# Patient Record
Sex: MALE | Race: BLACK OR AFRICAN AMERICAN | Employment: UNEMPLOYED | URBAN - METROPOLITAN AREA
[De-identification: names, ages, dates, MRNs, and addresses within clinical notes are randomized per-mention and may not be internally consistent; named-entity substitution may affect disease eponyms.]

---

## 2017-01-17 ENCOUNTER — OFFICE VISIT (OUTPATIENT)
Dept: INTERNAL MEDICINE CLINIC | Age: 52
End: 2017-01-17

## 2017-01-17 VITALS
HEIGHT: 69 IN | OXYGEN SATURATION: 99 % | HEART RATE: 79 BPM | RESPIRATION RATE: 18 BRPM | BODY MASS INDEX: 24.44 KG/M2 | SYSTOLIC BLOOD PRESSURE: 166 MMHG | TEMPERATURE: 97.9 F | DIASTOLIC BLOOD PRESSURE: 85 MMHG | WEIGHT: 165 LBS

## 2017-01-17 DIAGNOSIS — F41.0 PANIC ATTACKS: ICD-10-CM

## 2017-01-17 DIAGNOSIS — G89.4 CHRONIC PAIN SYNDROME: ICD-10-CM

## 2017-01-17 DIAGNOSIS — I10 ESSENTIAL HYPERTENSION, BENIGN: Primary | ICD-10-CM

## 2017-01-17 RX ORDER — AMLODIPINE BESYLATE 5 MG/1
TABLET ORAL
Qty: 90 TAB | Refills: 3 | Status: SHIPPED | OUTPATIENT
Start: 2017-01-17 | End: 2017-08-28 | Stop reason: SDUPTHER

## 2017-01-17 NOTE — PROGRESS NOTES
Jerry Lema is a 54 y.o. male and presents with Hypertension and Follow-up  . Pt has been having episodes of panic attacks. Has had 4-5 attacks in the past 4-5 days. +increased stress. C/o heart racing and feeling panicky. +h/o panic attacks before and has taken an unknown medication for them in the past.     Pt has only been taking HTN meds prn. No CP, SOB, or edema. No side effects. Last pill was a week ago. Pt doesn' like taking pills b/c he feels that they killed his mother and sister. Still c/o joint pain all over. Review of Systems  Constitutional: negative for fevers, chills, anorexia and weight loss  Eyes:   negative for visual disturbance and irritation  ENT:   negative for tinnitus,sore throat,nasal congestion,ear pains. hoarseness  Respiratory:  negative for cough, hemoptysis, dyspnea,wheezing  CV:   negative for chest pain, palpitations, lower extremity edema  GI:   negative for nausea, vomiting, diarrhea, abdominal pain,melena  Endo:               negative for polyuria,polydipsia,polyphagia,heat intolerance  Genitourinary: negative for frequency, dysuria and hematuria  Integument:  negative for rash and pruritus  Hematologic:  negative for easy bruising and gum/nose bleeding  Musculoskel: negative for myalgias,  back pain, muscle weakness  Neurological:  negative for headaches, dizziness, vertigo, memory problems and gait   Behavl/Psych: negative for feelings of depression    Past Medical History   Diagnosis Date    Anxiety      anxiety    Chronic pain      back    Gastrointestinal disorder     GERD (gastroesophageal reflux disease)     H/O Bell's palsy      left side of face tingles, intermittently, not frequently    Hyperlipidemia     Hypertension     Meningitis 2012     spinal meningitis  ; resolved as of 4/3/14    Positive PPD      CXR have been negative per pt 4/4/14    Tennis elbow 4/3/14     left      Past Surgical History   Procedure Laterality Date    Hx orthopaedic right knee ligament repair    Hx orthopaedic       right  hand    Hx orthopaedic       right rotator cuff     Social History     Social History    Marital status: SINGLE     Spouse name: N/A    Number of children: N/A    Years of education: N/A     Social History Main Topics    Smoking status: Current Some Day Smoker     Packs/day: 0.25    Smokeless tobacco: Never Used    Alcohol use No    Drug use: No    Sexual activity: Yes     Partners: Female     Other Topics Concern    None     Social History Narrative    ** Merged History Encounter **          Current Outpatient Prescriptions   Medication Sig Dispense Refill    amLODIPine (NORVASC) 5 mg tablet TAKE 1 TABLET BY MOUTH EVERY DAY 90 Tab 3    traMADol (ULTRAM) 50 mg tablet Take 1 Tab by mouth every eight (8) hours as needed for Pain. Max Daily Amount: 150 mg. Indications: PAIN 60 Tab 0    VITAMIN D2 50,000 unit capsule TAKE 1 CAPSULE BY MOUTH EVERY WEEK 12 Cap 3    diclofenac EC (VOLTAREN) 75 mg EC tablet  75 mg = 1 tab each dose, PO, bid, with food, # 60 tab, 1 Refills, Pharmacy: [id number]      atorvastatin (LIPITOR) 20 mg tablet Take 0.5 Tabs by mouth daily. 45 Tab 3    triamcinolone acetonide (KENALOG) 0.1 % topical cream Apply  to affected area two (2) times a day. use thin layer 45 g 11    albuterol (PROVENTIL VENTOLIN) 2.5 mg /3 mL (0.083 %) nebulizer solution USE 3 ML VIA NEBULIZER EVERY 4 HOURS AS NEEDED FOR WHEEZING 100 Each 11    VENTOLIN HFA 90 mcg/actuation inhaler INHALE 2 PUFFS BY MOUTH EVERY 4 HOURS AS NEEDED FOR WHEEZING 1 Inhaler 11    ergocalciferol (ERGOCALCIFEROL) 50,000 unit capsule TAKE 1 CAPSULE BY MOUTH EVERY WEEK 12 Cap 3    pantoprazole (PROTONIX) 40 mg tablet TAKE 1 TABLET BY MOUTH ONCE DAILY 90 Tab 11    ibuprofen (MOTRIN) 600 mg tablet Take 1 tablet by mouth every eight (8) hours as needed for Pain.  30 tablet 0    busPIRone (BUSPAR) 10 mg tablet Take 1 Tab by mouth three (3) times daily (with meals) for 30 days. Indications: anxiety with depression 90 Tab 0     No Known Allergies    Objective:  Visit Vitals    /85 (BP 1 Location: Right arm, BP Patient Position: Sitting)    Pulse 79    Temp 97.9 °F (36.6 °C) (Oral)    Resp 18    Ht 5' 9\" (1.753 m)    Wt 165 lb (74.8 kg)    SpO2 99%    BMI 24.37 kg/m2     Physical Exam:   General appearance - alert, well appearing, and in no distress  Mental status - alert, oriented to person, place, and time  Chest - clear to auscultation, no wheezes, rales or rhonchi, symmetric air entry   Heart - normal rate, regular rhythm, normal S1, S2, no murmurs, rubs, clicks or gallops   Abdomen - soft, nontender, nondistended, no masses or organomegaly  Lymph- no adenopathy palpable  Ext-peripheral pulses normal, no pedal edema, no clubbing or cyanosis  Skin-Warm and dry. no hyperpigmentation, vitiligo, or suspicious lesions  Neuro -alert, oriented, normal speech, no focal findings or movement disorder noted    Assessment/Plan:    ICD-10-CM ICD-9-CM    1. Essential hypertension, benign I10 401.1 amLODIPine (NORVASC) 5 mg tablet   2. Panic attacks F41.0 300.01 REFERRAL TO PSYCHOLOGY   3. Chronic pain syndrome G89.4 338.4 REFERRAL TO PSYCHOLOGY     Orders Placed This Encounter    REFERRAL TO PSYCHOLOGY     Referral Priority:   Routine     Referral Type:   Behavioral Health     Referral Reason:   Specialty Services Required    amLODIPine (NORVASC) 5 mg tablet     Sig: TAKE 1 TABLET BY MOUTH EVERY DAY     Dispense:  90 Tab     Refill:  3     **Patient requests 90 days supply**     Panic attacks- discussed non- pharmaceutical ways to cope with panic attacks  HTN- noncompliant. Advised compliance and stated consequences of uncontrolled HTN  Chronic pain-Pt isn't interested in meds so made psych referral to talk about other coping mechanisms for pain control     Follow-up Disposition:  Return in about 6 months (around 7/17/2017) for HTN.

## 2017-01-17 NOTE — MR AVS SNAPSHOT
Visit Information Date & Time Provider Department Dept. Phone Encounter #  
 1/17/2017 10:00 AM Lizeth Lindquist, [address] [id number] Upcoming Health Maintenance Date Due Pneumococcal 19-64 Medium Risk (1 of 1 - PPSV23) 7/18/1986 DTaP/Tdap/Td series (1 - Tdap) 7/18/1988 Allergies as of 1/17/2017  Review Complete On: 1/17/2017 By: Lizeth Lindquist MD  
 No Known Allergies Current Immunizations  Reviewed on 12/27/2011 No immunizations on file. Not reviewed this visit You Were Diagnosed With   
  
 Codes Comments Essential hypertension, benign    -  Primary ICD-10-CM: I10 
ICD-9-CM: 401.1 Panic attacks     ICD-10-CM: F41.0 ICD-9-CM: 300.01 Chronic pain syndrome     ICD-10-CM: G89.4 ICD-9-CM: 338. 4 Vitals BP Pulse Temp Resp Height(growth percentile) Weight(growth percentile) 166/85 (BP 1 Location: Right arm, BP Patient Position: Sitting) 79 97.9 °F (36.6 °C) (Oral) 18 5' 9\" (1.753 m) 165 lb (74.8 kg) SpO2 BMI Smoking Status 99% 24.37 kg/m2 Current Some Day Smoker Vitals History BMI and BSA Data Body Mass Index Body Surface Area  
 24.37 kg/m 2 1.91 m 2 Preferred Pharmacy Pharmacy Name Phone [facility] Keith Thakkar  [place] [phone] Your Updated Medication List  
  
   
This list is accurate as of: 1/17/17 11:25 AM.  Always use your most recent med list. amLODIPine 5 mg tablet Commonly known as:  Maldonado Cater TAKE 1 TABLET BY MOUTH EVERY DAY  
  
 atorvastatin 20 mg tablet Commonly known as:  LIPITOR Take 0.5 Tabs by mouth daily. busPIRone 10 mg tablet Commonly known as:  BUSPAR Take 1 Tab by mouth three (3) times daily (with meals) for 30 days. Indications: anxiety with depression  
  
 diclofenac EC 75 mg EC tablet Commonly known as:  VOLTAREN  
 75 mg = 1 tab each dose, PO, bid, with food, # 60 tab, 1 Refills, Pharmacy: [facility] * ergocalciferol 50,000 unit capsule Commonly known as:  ERGOCALCIFEROL  
TAKE 1 CAPSULE BY MOUTH EVERY WEEK * VITAMIN D2 50,000 unit capsule Generic drug:  ergocalciferol TAKE 1 CAPSULE BY MOUTH EVERY WEEK  
  
 ibuprofen 600 mg tablet Commonly known as:  MOTRIN Take 1 tablet by mouth every eight (8) hours as needed for Pain.  
  
 pantoprazole 40 mg tablet Commonly known as:  PROTONIX  
TAKE 1 TABLET BY MOUTH ONCE DAILY  
  
 traMADol 50 mg tablet Commonly known as:  ULTRAM  
Take 1 Tab by mouth every eight (8) hours as needed for Pain. Max Daily Amount: 150 mg. Indications: PAIN  
  
 triamcinolone acetonide 0.1 % topical cream  
Commonly known as:  KENALOG Apply  to affected area two (2) times a day. use thin layer * VENTOLIN HFA 90 mcg/actuation inhaler Generic drug:  albuterol INHALE 2 PUFFS BY MOUTH EVERY 4 HOURS AS NEEDED FOR WHEEZING  
  
 * albuterol 2.5 mg /3 mL (0.083 %) nebulizer solution Commonly known as:  PROVENTIL VENTOLIN  
USE 3 ML VIA NEBULIZER EVERY 4 HOURS AS NEEDED FOR WHEEZING  
  
 * Notice: This list has 4 medication(s) that are the same as other medications prescribed for you. Read the directions carefully, and ask your doctor or other care provider to review them with you. Prescriptions Sent to Pharmacy Refills  
 amLODIPine (NORVASC) 5 mg tablet 3 Sig: TAKE 1 TABLET BY MOUTH EVERY DAY Class: Normal  
 Pharmacy: [facility] [address] #: [phone] We Performed the Following REFERRAL TO PSYCHOLOGY [ODX91 Custom] Referral Information Referral ID Referred By Referred To  
  
 [id number] Dewain Factor Not Available Visits Status Start Date End Date 1 New Request 1/17/17 1/17/18 If your referral has a status of pending review or denied, additional information will be sent to support the outcome of this decision. Introducing [facility] & HEALTH SERVICES! [employer] introduces Plextronics patient portal. Now you can access parts of your medical record, email your doctor's office, and request medication refills online. 1. In your internet browser, go to [email or web address] 2. Click on the First Time User? Click Here link in the Sign In box. You will see the New Member Sign Up page. 3. Enter your Plextronics Access Code exactly as it appears below. You will not need to use this code after youve completed the sign-up process. If you do not sign up before the expiration date, you must request a new code. · Plextronics Access Code: [email or web address] Expires: 4/17/2017 10:12 AM 
 
4. Enter the last four digits of your Social Security Number (xxxx) and Date of Birth (mm/dd/yyyy) as indicated and click Submit. You will be taken to the next sign-up page. 5. Create a Plextronics ID. This will be your Plextronics login ID and cannot be changed, so think of one that is secure and easy to remember. 6. Create a Plextronics password. You can change your password at any time. 7. Enter your Password Reset Question and Answer. This can be used at a later time if you forget your password. 8. Enter your e-mail address. You will receive e-mail notification when new information is available in [address] 9. Click Sign Up. You can now view and download portions of your medical record. 10. Click the Download Summary menu link to download a portable copy of your medical information. If you have questions, please visit the Frequently Asked Questions section of the Plextronics website. Remember, Plextronics is NOT to be used for urgent needs. For medical emergencies, dial 911. Now available from your iPhone and Android! Please provide this summary of care documentation to your next provider. Your primary care clinician is listed as Gregorio Powell. If you have any questions after today's visit, please call [phone].

## 2017-01-17 NOTE — PROGRESS NOTES
1. Have you been to the ER, urgent care clinic since your last visit? Hospitalized since your last visit? No.    2. Have you seen or consulted any other health care providers outside of the [address] [place] [address] since your last visit? Include any pap smears or colon screening. No.  Had tramadol last night.

## 2017-02-03 ENCOUNTER — OFFICE VISIT (OUTPATIENT)
Dept: INTERNAL MEDICINE CLINIC | Age: 52
End: 2017-02-03

## 2017-02-03 VITALS
OXYGEN SATURATION: 99 % | SYSTOLIC BLOOD PRESSURE: 126 MMHG | WEIGHT: 170 LBS | HEIGHT: 69 IN | TEMPERATURE: 98.1 F | DIASTOLIC BLOOD PRESSURE: 87 MMHG | BODY MASS INDEX: 25.18 KG/M2 | HEART RATE: 71 BPM | RESPIRATION RATE: 18 BRPM

## 2017-02-03 DIAGNOSIS — T07.XXXA CONTUSION, MULTIPLE SITES: Primary | ICD-10-CM

## 2017-02-03 DIAGNOSIS — R91.1 PULMONARY NODULE: ICD-10-CM

## 2017-02-03 DIAGNOSIS — N20.0 NEPHROLITHIASIS: ICD-10-CM

## 2017-02-03 RX ORDER — TRAMADOL HYDROCHLORIDE 50 MG/1
50 TABLET ORAL
Qty: 75 TAB | Refills: 0 | Status: SHIPPED | OUTPATIENT
Start: 2017-02-03 | End: 2017-06-18

## 2017-02-03 NOTE — MR AVS SNAPSHOT
Visit Information Date & Time Provider Department Dept. Phone Encounter #  
 2/3/2017  1:15 PM Riky London, [address] [id number] Upcoming Health Maintenance Date Due Pneumococcal 19-64 Medium Risk (1 of 1 - PPSV23) 7/18/1986 DTaP/Tdap/Td series (1 - Tdap) 7/18/1988 Allergies as of 2/3/2017  Review Complete On: 2/3/2017 By: Riky London MD  
 No Known Allergies Current Immunizations  Reviewed on 12/27/2011 No immunizations on file. Not reviewed this visit You Were Diagnosed With   
  
 Codes Comments Contusion, multiple sites    -  Primary ICD-10-CM: T14.8 ICD-9-CM: 924.8 Vitals BP Pulse Temp Resp Height(growth percentile) Weight(growth percentile) 126/87 (BP 1 Location: Left arm, BP Patient Position: Sitting) 71 98.1 °F (36.7 °C) (Oral) 18 5' 9\" (1.753 m) 170 lb (77.1 kg) SpO2 BMI Smoking Status 99% 25.1 kg/m2 Current Some Day Smoker Vitals History BMI and BSA Data Body Mass Index Body Surface Area  
 25.1 kg/m 2 1.94 m 2 Preferred Pharmacy Pharmacy Name Phone Kim [address] [place] [address] 963, [address] [facility] [phone] Your Updated Medication List  
  
   
This list is accurate as of: 2/3/17  2:36 PM.  Always use your most recent med list. amLODIPine 5 mg tablet Commonly known as:  Cecilia Darting TAKE 1 TABLET BY MOUTH EVERY DAY  
  
 atorvastatin 20 mg tablet Commonly known as:  LIPITOR Take 0.5 Tabs by mouth daily. busPIRone 10 mg tablet Commonly known as:  BUSPAR Take 1 Tab by mouth three (3) times daily (with meals) for 30 days. Indications: anxiety with depression  
  
 diclofenac EC 75 mg EC tablet Commonly known as:  VOLTAREN  
75 mg = 1 tab each dose, PO, bid, with food, # 60 tab, 1 Refills, Pharmacy: [facility] [id number] * ergocalciferol 50,000 unit capsule Commonly known as:  ERGOCALCIFEROL  
TAKE 1 CAPSULE BY MOUTH EVERY WEEK * VITAMIN D2 50,000 unit capsule Generic drug:  ergocalciferol TAKE 1 CAPSULE BY MOUTH EVERY WEEK  
  
 ibuprofen 600 mg tablet Commonly known as:  MOTRIN Take 1 tablet by mouth every eight (8) hours as needed for Pain.  
  
 pantoprazole 40 mg tablet Commonly known as:  PROTONIX  
TAKE 1 TABLET BY MOUTH ONCE DAILY  
  
 traMADol 50 mg tablet Commonly known as:  ULTRAM  
Take 1 Tab by mouth every eight (8) hours as needed for Pain. Max Daily Amount: 150 mg. Indications: Pain  
  
 triamcinolone acetonide 0.1 % topical cream  
Commonly known as:  KENALOG Apply  to affected area two (2) times a day. use thin layer * VENTOLIN HFA 90 mcg/actuation inhaler Generic drug:  albuterol INHALE 2 PUFFS BY MOUTH EVERY 4 HOURS AS NEEDED FOR WHEEZING  
  
 * albuterol 2.5 mg /3 mL (0.083 %) nebulizer solution Commonly known as:  PROVENTIL VENTOLIN  
USE 3 ML VIA NEBULIZER EVERY 4 HOURS AS NEEDED FOR WHEEZING  
  
 * Notice: This list has 4 medication(s) that are the same as other medications prescribed for you. Read the directions carefully, and ask your doctor or other care provider to review them with you. Prescriptions Printed Refills  
 traMADol (ULTRAM) 50 mg tablet 0 Sig: Take 1 Tab by mouth every eight (8) hours as needed for Pain. Max Daily Amount: 150 mg. Indications: Pain Class: Print Route: Oral  
  
Introducing [facility] & HEALTH SERVICES! Madison Chap[employer] introduces [employer] patient portal. Now you can access parts of your medical record, email your doctor's office, and request medication refills online. 1. In your internet browser, go to [email or web address] 2. Click on the First Time User? Click Here link in the Sign In box. You will see the New Member Sign Up page. 3. Enter your Jolic[employer]oud Access Code exactly as it appears below.  You will not need to use this code after youve completed the sign-up process. If you do not sign up before the expiration date, you must request a new code. · Deg[employer] Access Code: [email or web address] Expires: 4/17/2017 10:12 AM 
 
4. Enter the last four digits of your Social Security Number (xxxx) and Date of Birth (mm/dd/yyyy) as indicated and click Submit. You will be taken to the next sign-up page. 5. Create a Degreed ID. This will be your Degreed login ID and cannot be changed, so think of one that is secure and easy to remember. 6. Create a Degreed password. You can change your password at any time. 7. Enter your Password Reset Question and Answer. This can be used at a later time if you forget your password. 8. Enter your e-mail address. You will receive e-mail notification when new information is available in [address] 9. Click Sign Up. You can now view and download portions of your medical record. 10. Click the Download Summary menu link to download a portable copy of your medical information. If you have questions, please visit the Frequently Asked Questions section of the Deg[employer] website. Remember, Degreed is NOT to be used for urgent needs. For medical emergencies, dial 911. Now available from your iPhone and Android! Please provide this summary of care documentation to your next provider. Your primary care clinician is listed as Danni Gomez. If you have any questions after today's visit, please call [phone].
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
## 2017-06-18 ENCOUNTER — HOSPITAL ENCOUNTER (EMERGENCY)
Age: 52
Discharge: HOME | End: 2017-06-18
Attending: EMERGENCY MEDICINE
Payer: MEDICAID

## 2017-06-18 VITALS
WEIGHT: 160 LBS | RESPIRATION RATE: 16 BRPM | HEIGHT: 69 IN | HEART RATE: 53 BPM | BODY MASS INDEX: 23.7 KG/M2 | SYSTOLIC BLOOD PRESSURE: 120 MMHG | OXYGEN SATURATION: 100 % | DIASTOLIC BLOOD PRESSURE: 84 MMHG | TEMPERATURE: 98.1 F

## 2017-06-18 DIAGNOSIS — S61.411A LACERATION OF RIGHT HAND WITHOUT FOREIGN BODY, INITIAL ENCOUNTER: Primary | ICD-10-CM

## 2017-06-18 DIAGNOSIS — T07.XXXA CONTUSION, MULTIPLE SITES: ICD-10-CM

## 2017-06-18 PROCEDURE — 74011000250 HC RX REV CODE- 250: Performed by: PHYSICIAN ASSISTANT

## 2017-06-18 PROCEDURE — 74011250637 HC RX REV CODE- 250/637: Performed by: PHYSICIAN ASSISTANT

## 2017-06-18 PROCEDURE — 75810000293 HC SIMP/SUPERF WND  RPR

## 2017-06-18 PROCEDURE — 99282 EMERGENCY DEPT VISIT SF MDM: CPT

## 2017-06-18 PROCEDURE — 77030031132 HC SUT NYL COVD -A

## 2017-06-18 PROCEDURE — 77030018836 HC SOL IRR NACL ICUM -A

## 2017-06-18 RX ORDER — IBUPROFEN 600 MG/1
600 TABLET ORAL
Qty: 30 TAB | Refills: 0 | Status: SHIPPED | OUTPATIENT
Start: 2017-06-18 | End: 2019-03-04

## 2017-06-18 RX ORDER — LIDOCAINE HYDROCHLORIDE 10 MG/ML
6 INJECTION, SOLUTION EPIDURAL; INFILTRATION; INTRACAUDAL; PERINEURAL ONCE
Status: COMPLETED | OUTPATIENT
Start: 2017-06-18 | End: 2017-06-18

## 2017-06-18 RX ORDER — TRAMADOL HYDROCHLORIDE 50 MG/1
50 TABLET ORAL
Qty: 10 TAB | Refills: 0 | Status: SHIPPED | OUTPATIENT
Start: 2017-06-18 | End: 2017-08-28 | Stop reason: ALTCHOICE

## 2017-06-18 RX ADMIN — LIDOCAINE HYDROCHLORIDE 6 ML: 10 INJECTION, SOLUTION EPIDURAL; INFILTRATION; INTRACAUDAL; PERINEURAL at 17:02

## 2017-06-18 RX ADMIN — NEOMYCIN-BACITRACIN-POLYMYXIN OINT 1 PACKET: OINTMENT at 17:02

## 2017-06-18 NOTE — ED NOTES
1 1/2\" semi-circular laceration right medial hand. Bleeding controlled with direct pressure. Laceration a result of washing dishes and a glass broke. Reports he received tetanus two years ago.

## 2017-06-18 NOTE — ED PROVIDER NOTES
Patient is a 54 y.o. male presenting with skin laceration. The history is provided by the patient. Laceration    The incident occurred less than 1 hour ago (Pt reports laceration to Rt 5th digit when a glass broke while he was cleaning it 1 hr PTA. ). The laceration is located on the right hand. The laceration is 2 cm in size. Foreign body present: no. Possible foreign bodies present include glass. The pain is at a severity of 7/10. The pain has been constant since onset. Associated symptoms include numbness (distal L 5th digit). Pertinent negatives include no tingling, no weakness, no loss of motion, no coolness and no discoloration. The patient's last tetanus shot was less than 5 years ago. Past Medical History:   Diagnosis Date    Anxiety     anxiety    Chronic pain     back    Gastrointestinal disorder     GERD (gastroesophageal reflux disease)     H/O Bell's palsy     left side of face tingles, intermittently, not frequently    Hyperlipidemia     Hypertension     Meningitis 2012    spinal meningitis  ; resolved as of 4/3/14    Positive PPD     CXR have been negative per pt 4/4/14    Tennis elbow 4/3/14    left        Past Surgical History:   Procedure Laterality Date    HX ORTHOPAEDIC      right knee ligament repair    HX ORTHOPAEDIC      right  hand    HX ORTHOPAEDIC      right rotator cuff         Family History:   Problem Relation Age of Onset    Hypertension Mother     Diabetes Father     Hypertension Father     Cancer Brother      prostate    Heart Disease Neg Hx     Heart Attack Neg Hx     High Cholesterol Neg Hx     Stroke Neg Hx        Social History     Social History    Marital status: SINGLE     Spouse name: N/A    Number of children: N/A    Years of education: N/A     Occupational History    Not on file. Social History Main Topics    Smoking status: Current Some Day Smoker     Packs/day: 0.25    Smokeless tobacco: Never Used    Alcohol use No    Drug use:  No  Sexual activity: Yes     Partners: Female     Other Topics Concern    Not on file     Social History Narrative    ** Merged History Encounter **              ALLERGIES: Review of patient's allergies indicates no known allergies. Review of Systems   Constitutional: Negative for activity change, chills, diaphoresis and fever. HENT: Negative for ear discharge, facial swelling, nosebleeds, postnasal drip, rhinorrhea, trouble swallowing and voice change. Eyes: Negative for photophobia, pain and visual disturbance. Respiratory: Negative for apnea, cough and shortness of breath. Cardiovascular: Negative for chest pain and palpitations. Gastrointestinal: Negative for abdominal pain, diarrhea, nausea and vomiting. Genitourinary: Negative for decreased urine volume, difficulty urinating and hematuria. Musculoskeletal: Negative for arthralgias, back pain, gait problem, joint swelling, neck pain and neck stiffness. Skin: Positive for wound. Negative for color change, pallor and rash. Neurological: Positive for numbness (distal L 5th digit). Negative for dizziness, tingling, facial asymmetry, speech difficulty, weakness and headaches. Psychiatric/Behavioral: Negative. Vitals:    06/18/17 1625   BP: 120/84   Pulse: (!) 53   Resp: 16   Temp: 98.1 °F (36.7 °C)   SpO2: 100%   Weight: 72.6 kg (160 lb)   Height: 5' 9\" (1.753 m)            Physical Exam   Constitutional: He is oriented to person, place, and time. He appears well-developed and well-nourished. No distress. HENT:   Head: Normocephalic and atraumatic. Right Ear: Hearing and external ear normal.   Left Ear: Hearing and external ear normal.   Nose: Nose normal.   Eyes: Conjunctivae and EOM are normal. Pupils are equal, round, and reactive to light. Neck: Normal range of motion. Neck supple. Pulmonary/Chest: Effort normal. No accessory muscle usage. No respiratory distress.    Musculoskeletal:        Right wrist: Normal. Right hand: He exhibits tenderness and laceration. He exhibits normal range of motion, no bony tenderness, normal two-point discrimination, normal capillary refill and no swelling. Normal sensation noted. Normal strength noted. Left hand: Normal.        Hands:  2 cm straight laceration w/ skin flap on proximal medial L 5th digit at MCP joint. Bleeding controlled. No FB. Neurological: He is alert and oriented to person, place, and time. No cranial nerve deficit. Skin: Skin is warm, dry and intact. He is not diaphoretic. No pallor. Psychiatric: He has a normal mood and affect. His speech is normal and behavior is normal. Judgment and thought content normal.   Nursing note and vitals reviewed. MDM  Number of Diagnoses or Management Options  Laceration of right hand without foreign body, initial encounter:   Diagnosis management comments: DDx: laceration, abrasion, contusion    LABORATORY TESTS:  No results found for this or any previous visit (from the past 12 hour(s)). IMAGING RESULTS:  No orders to display    MEDICATIONS GIVEN:  Medications  neomycin-bacitracnZn-polymyxnB (NEOSPORIN) ointment 1 Packet (1 Packet Topical Given 6/18/17 1702)  lidocaine (PF) (XYLOCAINE) 10 mg/mL (1 %) injection 6 mL (6 mL IntraDERMal Given by Provider 6/18/17 1702)    IMPRESSION:  Laceration of right hand without foreign body, initial encounter  (primary encounter diagnosis)  Contusion, multiple sites    PLAN:  1. Current Discharge Medication List    START taking these medications    neomycin-bacitracin-polymyxin (NEOSPORIN, UVF-XPM-BMRFN,) 3.5mg-400 unit- 5,000 unit/gram ointment  Apply  to affected area three (3) times daily for 10 days. Apply to affected area  Qty: 1 Tube Refills: 0      CONTINUE these medications which have CHANGED    traMADol (ULTRAM) 50 mg tablet  Take 1 Tab by mouth every eight (8) hours as needed for Pain. Max Daily Amount: 150 mg.  Indications: Pain  Qty: 10 Tab Refills: 0  Associated Diagnoses:Contusion, multiple sites    ibuprofen (MOTRIN) 600 mg tablet  Take 1 Tab by mouth every eight (8) hours as needed for Pain. Qty: 30 Tab Refills: 0      CONTINUE these medications which have NOT CHANGED    amLODIPine (NORVASC) 5 mg tablet  TAKE 1 TABLET BY MOUTH EVERY DAY  Qty: 90 Tab Refills: 3  Comments: **Patient requests 90 days supply**  Associated Diagnoses:Essential hypertension, benign    !! VITAMIN D2 50,000 unit capsule  TAKE 1 CAPSULE BY MOUTH EVERY WEEK  Qty: 12 Cap Refills: 3    diclofenac EC (VOLTAREN) 75 mg EC tablet   75 mg = 1 tab each dose, PO, bid, with food, # 60 tab, 1 Refills, Pharmacy: [facility] [id number]    atorvastatin (LIPITOR) 20 mg tablet  Take 0.5 Tabs by mouth daily. Qty: 45 Tab Refills: 3  Associated Diagnoses:Hyperlipidemia, unspecified hyperlipidemia type    triamcinolone acetonide (KENALOG) 0.1 % topical cream  Apply  to affected area two (2) times a day. use thin layer  Qty: 45 g Refills: 11    albuterol (PROVENTIL VENTOLIN) 2.5 mg /3 mL (0.083 %) nebulizer solution  USE 3 ML VIA NEBULIZER EVERY 4 HOURS AS NEEDED FOR WHEEZING  Qty: 100 Each Refills: 11  Comments: **Patient requests 90 days supply**    VENTOLIN HFA 90 mcg/actuation inhaler  INHALE 2 PUFFS BY MOUTH EVERY 4 HOURS AS NEEDED FOR WHEEZING  Qty: 1 Inhaler Refills: 11    !! ergocalciferol (ERGOCALCIFEROL) 50,000 unit capsule  TAKE 1 CAPSULE BY MOUTH EVERY WEEK  Qty: 12 Cap Refills: 3    pantoprazole (PROTONIX) 40 mg tablet  TAKE 1 TABLET BY MOUTH ONCE DAILY  Qty: 90 Tab Refills: 11  Comments: **Patient requests 90 days supply**    busPIRone (BUSPAR) 10 mg tablet  Take 1 Tab by mouth three (3) times daily (with meals) for 30 days. Indications: anxiety with depression  Qty: 90 Tab Refills: 0    !! - Potential duplicate medications found. Please discuss with provider.         2. Follow-up Information     Follow up With Details Comments [id number] Doretha Yu MD Schedule an appointment as soon as possible for a   visit in 1 week As needed, If symptoms worsen Racheal Morris  [address] [facility] EMERGENCY DEPT Go in 8 days For suture removal [address] [phone]      Return to ED if worse                  Amount and/or Complexity of Data Reviewed  Tests in the medicine section of CPT®: ordered and reviewed    Patient Progress  Patient progress: stable    ED Course       WOUND REPAIR  Date/Time: 6/18/2017 5:41 PM  Performed by: PAPreparation: skin prepped with Betadine and sterile field established  Pre-procedure re-eval: Immediately prior to the procedure, the patient was reevaluated and found suitable for the planned procedure and any planned medications. Time out: Immediately prior to the procedure a time out was called to verify the correct patient, procedure, equipment, staff and marking as appropriate. .  Location details: right hand  Wound length:2.5 cm or less  Anesthesia: local infiltration    Anesthesia:  Anesthesia: local infiltration  Local Anesthetic: lidocaine 1% without epinephrine   Anesthetic total: 4 mL  Foreign bodies: no foreign bodies  Irrigation solution: saline  Irrigation method: syringe  Debridement: minimal  Skin closure: 6-0 nylon  Number of sutures: 8  Technique: simple  Approximation: close  Dressing: 4x4 and antibiotic ointment  Patient tolerance: Patient tolerated the procedure well with no immediate complications  My total time at bedside, performing this procedure was 16-30 minutes. 5:42 PM  I have discussed with patient their diagnosis, treatment, and follow up plan. The patient agrees to follow up as outlined in discharge paperwork and also to return to the ED with any worsening.  Man Mcfadden PA-C

## 2017-06-18 NOTE — DISCHARGE INSTRUCTIONS

## 2017-06-26 ENCOUNTER — HOSPITAL ENCOUNTER (EMERGENCY)
Age: 52
Discharge: HOME | End: 2017-06-26
Attending: EMERGENCY MEDICINE
Payer: MEDICAID

## 2017-06-26 VITALS
SYSTOLIC BLOOD PRESSURE: 139 MMHG | HEART RATE: 73 BPM | TEMPERATURE: 98.1 F | RESPIRATION RATE: 16 BRPM | BODY MASS INDEX: 23.7 KG/M2 | WEIGHT: 160 LBS | OXYGEN SATURATION: 100 % | HEIGHT: 69 IN | DIASTOLIC BLOOD PRESSURE: 96 MMHG

## 2017-06-26 DIAGNOSIS — Z48.02 VISIT FOR SUTURE REMOVAL: Primary | ICD-10-CM

## 2017-06-26 PROCEDURE — 75810000275 HC EMERGENCY DEPT VISIT NO LEVEL OF CARE

## 2017-06-26 NOTE — DISCHARGE INSTRUCTIONS
Learning About Stitches and Staples Removal  When are stitches and staples removed? Your doctor will tell you when to have your stitches or staples removed, usually in 7 to 14 days. How long you'll be told to wait will depend on things like where the wound is located, how big and how deep the wound is, and what your general health is like. Do not remove the stitches on your own. Stitches on the face are usually removed within a week. But stitches and staples on other areas of the body, such as on the back or belly or over a joint, may need to stay in place longer, often a week or two. Be sure to follow your doctor's instructions. How are stitches and staples removed? It usually doesn't hurt when the doctor removes the stitches or staples. You may feel a tug as each stitch or staple is removed. · You will either be seated or lying down. · To remove stitches, the doctor will use scissors to cut each of the knots and then pull the threads out. · To remove staples, the doctor will use a tool to take out the staples one at a time. · The area may still feel tender after the stitches or staples are gone. But it should feel better within a few minutes or up to a few hours. What can you expect after stitches and staples are removed? Depending on the type and location of the cut, you will have a scar. Scars usually fade over time. Keep the area clean, but you won't need a bandage. When should you call for help? Call your doctor now or seek immediate medical care if:  · You have new pain, or your pain gets worse. · You have trouble moving the area near the scar. · You have symptoms of infection, such as:  ¨ Increased pain, swelling, warmth, or redness around the scar. ¨ Red streaks leading from the scar. ¨ Pus draining from the scar. ¨ A fever. Watch closely for changes in your health, and be sure to contact your doctor if:  · The scar opens. · You do not get better as expected.   Follow-up care is a key part of your treatment and safety. Be sure to make and go to all appointments, and call your doctor if you do not get better as expected. It's also a good idea to keep a list of the medicines you take. Where can you learn more? Go to http://oskar-delma.info/. Enter K925 in the search box to learn more about \"Learning About Stitches and Staples Removal.\"  Current as of: March 20, 2017  Content Version: 11.3  © 9042-5081 [employer]. Care instructions adapted under license by [employer] (which disclaims liability or warranty for this information). If you have questions about a medical condition or this instruction, always ask your healthcare professional. [address] any warranty or liability for your use of this information.

## 2017-06-26 NOTE — ED PROVIDER NOTES
HPI Comments: Love Washington is a 54 y.o. male with pertinent PMHx of HTN and anxiety who presents ambulatory to ED for suture removal of 8 sutures in right hand that were placed in the [facility] ED on 6/18/17 after laceration. Pt denies any pain at suture site. PCP:  Macho Watts MD    Social Hx:  tobacco use (+), EtOH use (-)    There are no other complaints, changes or physical findings at this time. The history is provided by the patient. No  was used. Past Medical History:   Diagnosis Date    Anxiety     anxiety    Chronic pain     back    Gastrointestinal disorder     GERD (gastroesophageal reflux disease)     H/O Bell's palsy     left side of face tingles, intermittently, not frequently    Hyperlipidemia     Hypertension     Meningitis 2012    spinal meningitis  ; resolved as of 4/3/14    Positive PPD     CXR have been negative per pt 4/4/14    Tennis elbow 4/3/14    left        Past Surgical History:   Procedure Laterality Date    HX ORTHOPAEDIC      right knee ligament repair    HX ORTHOPAEDIC      right  hand    HX ORTHOPAEDIC      right rotator cuff         Family History:   Problem Relation Age of Onset    Hypertension Mother     Diabetes Father     Hypertension Father     Cancer Brother      prostate    Heart Disease Neg Hx     Heart Attack Neg Hx     High Cholesterol Neg Hx     Stroke Neg Hx        Social History     Social History    Marital status: SINGLE     Spouse name: N/A    Number of children: N/A    Years of education: N/A     Occupational History    Not on file.      Social History Main Topics    Smoking status: Current Some Day Smoker     Packs/day: 0.25    Smokeless tobacco: Never Used    Alcohol use No    Drug use: No    Sexual activity: Yes     Partners: Female     Other Topics Concern    Not on file     Social History Narrative    ** Merged History Encounter **              ALLERGIES: Review of patient's allergies indicates no known allergies. Review of Systems   Constitutional: Negative for chills and fever. HENT: Negative for congestion, rhinorrhea, sneezing and sore throat. Eyes: Negative for redness and visual disturbance. Respiratory: Negative for shortness of breath. Cardiovascular: Negative for chest pain and leg swelling. Gastrointestinal: Negative for abdominal pain, nausea and vomiting. Genitourinary: Negative for difficulty urinating and frequency. Musculoskeletal: Negative for back pain, myalgias and neck stiffness. Skin: Positive for wound (suture removal). Negative for rash. Neurological: Negative for dizziness, syncope, weakness and headaches. Hematological: Negative for adenopathy. Vitals:    06/26/17 1030   BP: (!) 139/96   Pulse: 73   Resp: 16   Temp: 98.1 °F (36.7 °C)   SpO2: 100%   Weight: 72.6 kg (160 lb)   Height: 5' 9\" (1.753 m)            Physical Exam   Constitutional: He is oriented to person, place, and time. He appears well-developed and well-nourished. HENT:   Head: Normocephalic and atraumatic. Mouth/Throat: Oropharynx is clear and moist and mucous membranes are normal.   Eyes: EOM are normal.   Neck: Normal range of motion and full passive range of motion without pain. Neck supple. Cardiovascular: Normal rate, regular rhythm, normal heart sounds, intact distal pulses and normal pulses. No murmur heard. Pulmonary/Chest: Effort normal and breath sounds normal. No respiratory distress. He exhibits no tenderness. Abdominal: Soft. Normal appearance and bowel sounds are normal. There is no tenderness. There is no rebound and no guarding. Neurological: He is alert and oriented to person, place, and time. He has normal strength. Skin: Skin is warm, dry and intact. No rash noted. No erythema. 8 sutures in a curvilinear pattern of right hand, mild ecchymosis and dehiscence between stitch 4 and 5   Psychiatric: He has a normal mood and affect.  His speech is normal and behavior is normal. Judgment and thought content normal.   Nursing note and vitals reviewed. MDM  Number of Diagnoses or Management Options  Visit for suture removal:   Diagnosis management comments: DDx: suture removal        Amount and/or Complexity of Data Reviewed  Review and summarize past medical records: yes    Patient Progress  Patient progress: stable    ED Course       Procedures      Procedure Note - Suture Removal  10:43 AM   Performed by: Juan Correa MD  8 suture (s) were removed from right hand. No signs/sxs of infection noted. Wound healing well. Sutures removed without incident or complications. The procedure took 1-15 minutes, and pt tolerated well. Written by Irving Gagnon. Christina Arreola, ED Scribe, as dictated by Juan Correa MD.      IMPRESSION:  1. Visit for suture removal        PLAN:  1. Current Discharge Medication List        2. Follow-up Information     Follow up With Details Comments [id number] Doretha Yu MD  As needed [facility] [address]  [phone]          Return to ED if worse       DISCHARGE NOTE  10:46 AM  The patient has been re-evaluated and is ready for discharge. Reviewed available results with patient. Counseled pt on diagnosis and care plan. Pt has expressed understanding, and all questions have been answered. Pt agrees with plan and agrees to follow up as recommended, or return to the ED if their symptoms worsen. Discharge instructions have been provided and explained to the pt, along with reasons to return to the ED. Attestations: This note is prepared by Irving Gagnon. Christina Arreola, acting as Scribe for Juan Correa MD.    Juan Correa MD: The scribe's documentation has been prepared under my direction and personally reviewed by me in its entirety. I confirm that the note above accurately reflects all work, treatment, procedures, and medical decision making performed by me.

## 2017-06-26 NOTE — ED NOTES
Discharge instructions were given to the patient by Atilio Friedman RN. Patient was given no prescriptions and was encouraged to call or return to the ED for worsening issues or problems and was encouraged to schedule a follow up appointment for continuing care. Patient given a current medication reconciliation form and verbalized understanding of their medications and importance of discussing medications at follow-up. The patient verbalized understanding of discharge instructions and prescriptions, all questions were answered. The patient has no further concerns at this time. Patient stable at time of discharge. The patient left the Emergency Department ambulatory, with self, and in no acute distress. Patient declined wheelchair transport out of Emergency Department.

## 2017-08-28 ENCOUNTER — OFFICE VISIT (OUTPATIENT)
Dept: INTERNAL MEDICINE CLINIC | Age: 52
End: 2017-08-28

## 2017-08-28 VITALS
BODY MASS INDEX: 23.99 KG/M2 | RESPIRATION RATE: 16 BRPM | SYSTOLIC BLOOD PRESSURE: 150 MMHG | HEART RATE: 79 BPM | DIASTOLIC BLOOD PRESSURE: 90 MMHG | WEIGHT: 162 LBS | HEIGHT: 69 IN | OXYGEN SATURATION: 98 % | TEMPERATURE: 98 F

## 2017-08-28 DIAGNOSIS — E04.1 THYROID NODULE: ICD-10-CM

## 2017-08-28 DIAGNOSIS — E78.5 HYPERLIPIDEMIA, UNSPECIFIED HYPERLIPIDEMIA TYPE: ICD-10-CM

## 2017-08-28 DIAGNOSIS — R91.8 MULTIPLE LUNG NODULES: ICD-10-CM

## 2017-08-28 DIAGNOSIS — Z12.11 SCREENING FOR COLON CANCER: ICD-10-CM

## 2017-08-28 DIAGNOSIS — L30.9 ECZEMA, UNSPECIFIED TYPE: ICD-10-CM

## 2017-08-28 DIAGNOSIS — I10 ESSENTIAL HYPERTENSION: Primary | ICD-10-CM

## 2017-08-28 DIAGNOSIS — K21.9 GASTROESOPHAGEAL REFLUX DISEASE WITHOUT ESOPHAGITIS: ICD-10-CM

## 2017-08-28 DIAGNOSIS — E55.9 VITAMIN D DEFICIENCY: ICD-10-CM

## 2017-08-28 LAB
CHOLEST SERPL-MCNC: 188 MG/DL
HDLC SERPL-MCNC: 35 MG/DL
LDL CHOLESTEROL POC: 139 MG/DL
NON-HDL GOAL (POC): 153
TCHOL/HDL RATIO (POC): 5.3
TRIGL SERPL-MCNC: 70 MG/DL

## 2017-08-28 RX ORDER — DICLOFENAC SODIUM 75 MG/1
75 TABLET, DELAYED RELEASE ORAL 2 TIMES DAILY
Qty: 60 TAB | Refills: 3 | Status: SHIPPED | OUTPATIENT
Start: 2017-08-28 | End: 2017-08-28 | Stop reason: SDUPTHER

## 2017-08-28 RX ORDER — TRIAMCINOLONE ACETONIDE 1 MG/G
CREAM TOPICAL 2 TIMES DAILY
Qty: 45 G | Refills: 11 | Status: SHIPPED | OUTPATIENT
Start: 2017-08-28 | End: 2017-09-10

## 2017-08-28 RX ORDER — ERGOCALCIFEROL 1.25 MG/1
CAPSULE ORAL
Qty: 12 CAP | Refills: 3 | Status: SHIPPED | OUTPATIENT
Start: 2017-08-28 | End: 2018-05-24 | Stop reason: SDUPTHER

## 2017-08-28 RX ORDER — AMLODIPINE BESYLATE 5 MG/1
TABLET ORAL
Qty: 90 TAB | Refills: 3 | Status: SHIPPED | OUTPATIENT
Start: 2017-08-28 | End: 2018-05-24 | Stop reason: SDUPTHER

## 2017-08-28 NOTE — PATIENT INSTRUCTIONS
Tilt Activation    Thank you for requesting access to Tilt. Please follow the instructions below to securely access and download your online medical record. Tilt allows you to send messages to your doctor, view your test results, renew your prescriptions, schedule appointments, and more. How Do I Sign Up? 1. In your internet browser, go to [email or web address]  2. Click on the First Time User? Click Here link in the Sign In box. You will be redirect to the New Member Sign Up page. 3. Enter your Tilt Access Code exactly as it appears below. You will not need to use this code after youve completed the sign-up process. If you do not sign up before the expiration date, you must request a new code. Tilt Access Code: [email or web address]  Expires: 2017  5:41 PM (This is the date your Tilt access code will )    4. Enter the last four digits of your Social Security Number (xxxx) and Date of Birth (mm/dd/yyyy) as indicated and click Submit. You will be taken to the next sign-up page. 5. Create a Tilt ID. This will be your Tilt login ID and cannot be changed, so think of one that is secure and easy to remember. 6. Create a Tilt password. You can change your password at any time. 7. Enter your Password Reset Question and Answer. This can be used at a later time if you forget your password. 8. Enter your e-mail address. You will receive e-mail notification when new information is available in [address]. 9. Click Sign Up. You can now view and download portions of your medical record. 10. Click the Download Summary menu link to download a portable copy of your medical information. Additional Information    If you have questions, please visit the Frequently Asked Questions section of the Tilt website at [email or web address] Remember, Tilt is NOT to be used for urgent needs. For medical emergencies, dial 911.

## 2017-08-28 NOTE — PROGRESS NOTES
Renetta Villareal is a 50 y.o. male and presents with Establish Care and Hypertension  . Subjective:  Hypertension Review:  The patient has essential hypertension  Diet and Lifestyle: generally follows a  low sodium diet, exercises sporadically  Home BP Monitoring: is not measured at home. Pertinent ROS: taking medications as instructed, no medication side effects noted, no TIA's, no chest pain on exertion, no dyspnea on exertion, no swelling of ankles. Dyslipidemia Review:  Patient presents for evaluation of lipids. Compliance with treatment thus far has been excellent. A repeat fasting lipid profile was done. The patient does not use medications that may worsen dyslipidemias (corticosteroids, progestins, anabolic steroids, diuretics, beta-blockers, amiodarone, cyclosporine, olanzapine). The patient exercises some    He had a cta 6 months ago revealing lung nodules asa well as a thyroid nodule    He has a history of depression reported and is off medication for this at this time. He has vitamin d deficiency      Review of Systems  Constitutional: negative for fevers, chills, anorexia and weight loss  Eyes:   negative for visual disturbance and irritation  ENT:   negative for tinnitus,sore throat,nasal congestion,ear pains. hoarseness  Respiratory:  negative for cough, hemoptysis, dyspnea,wheezing  CV:   negative for chest pain, palpitations, lower extremity edema  GI:   negative for nausea, vomiting, diarrhea, abdominal pain,melena  Endo:               negative for polyuria,polydipsia,polyphagia,heat intolerance  Genitourinary: negative for frequency, dysuria and hematuria  Integument:  negative for rash and pruritus  Hematologic:  negative for easy bruising and gum/nose bleeding  Musculoskel: negative for myalgias, arthralgias, back pain, muscle weakness, joint pain  Neurological:  negative for headaches, dizziness, vertigo, memory problems and gait   Behavl/Psych: negative for feelings of anxiety, depression, mood changes    Past Medical History:   Diagnosis Date    Anxiety     anxiety    Chronic pain     back    Gastrointestinal disorder     GERD (gastroesophageal reflux disease)     H/O Bell's palsy     left side of face tingles, intermittently, not frequently    Hyperlipidemia     Hypertension     Meningitis 2012    spinal meningitis  ; resolved as of 4/3/14    Positive PPD     CXR have been negative per pt 4/4/14    Tennis elbow 4/3/14    left      Past Surgical History:   Procedure Laterality Date    HX ORTHOPAEDIC      right knee ligament repair    HX ORTHOPAEDIC      right  hand    HX ORTHOPAEDIC      right rotator cuff     Social History     Social History    Marital status: SINGLE     Spouse name: N/A    Number of children: N/A    Years of education: N/A     Social History Main Topics    Smoking status: Current Some Day Smoker     Packs/day: 0.25    Smokeless tobacco: Never Used    Alcohol use No    Drug use: No    Sexual activity: Yes     Partners: Female     Other Topics Concern    None     Social History Narrative    ** Merged History Encounter **          Family History   Problem Relation Age of Onset    Hypertension Mother     Diabetes Father     Hypertension Father     Cancer Brother      prostate    Heart Disease Neg Hx     Heart Attack Neg Hx     High Cholesterol Neg Hx     Stroke Neg Hx      Current Outpatient Prescriptions   Medication Sig Dispense Refill    amLODIPine (NORVASC) 5 mg tablet TAKE 1 TABLET BY MOUTH EVERY DAY 90 Tab 3    ergocalciferol (VITAMIN D2) 50,000 unit capsule TAKE 1 CAPSULE BY MOUTH EVERY WEEK 12 Cap 3    diclofenac EC (VOLTAREN) 75 mg EC tablet Take 1 Tab by mouth two (2) times a day. 60 Tab 3    triamcinolone acetonide (KENALOG) 0.1 % topical cream Apply  to affected area two (2) times a day. use thin layer 45 g 11    ibuprofen (MOTRIN) 600 mg tablet Take 1 Tab by mouth every eight (8) hours as needed for Pain.  30 Tab 0    atorvastatin (LIPITOR) 20 mg tablet Take 0.5 Tabs by mouth daily. 45 Tab 3    albuterol (PROVENTIL VENTOLIN) 2.5 mg /3 mL (0.083 %) nebulizer solution USE 3 ML VIA NEBULIZER EVERY 4 HOURS AS NEEDED FOR WHEEZING 100 Each 11    VENTOLIN HFA 90 mcg/actuation inhaler INHALE 2 PUFFS BY MOUTH EVERY 4 HOURS AS NEEDED FOR WHEEZING 1 Inhaler 11    ergocalciferol (ERGOCALCIFEROL) 50,000 unit capsule TAKE 1 CAPSULE BY MOUTH EVERY WEEK 12 Cap 3    pantoprazole (PROTONIX) 40 mg tablet TAKE 1 TABLET BY MOUTH ONCE DAILY 90 Tab 11    busPIRone (BUSPAR) 10 mg tablet Take 1 Tab by mouth three (3) times daily (with meals) for 30 days. Indications: anxiety with depression 90 Tab 0     No Known Allergies    Objective:  Visit Vitals    /90    Pulse 79    Temp 98 °F (36.7 °C) (Oral)    Resp 16    Ht 5' 9\" (1.753 m)    Wt 162 lb (73.5 kg)    SpO2 98%    BMI 23.92 kg/m2     Physical Exam:   General appearance - alert, well appearing, and in no distress  Mental status - alert, oriented to person, place, and time  EYE-FERNANDA, EOMI, corneas normal, no foreign bodies  ENT-ENT exam normal, no neck nodes or sinus tenderness  Nose - normal and patent, no erythema, discharge or polyps  Mouth - mucous membranes moist, pharynx normal without lesions  Neck - supple, no significant adenopathy   Chest - clear to auscultation, no wheezes, rales or rhonchi, symmetric air entry   Heart - normal rate, regular rhythm, normal S1, S2, no murmurs, rubs, clicks or gallops   Abdomen - soft, nontender, nondistended, no masses or organomegaly  Lymph- no adenopathy palpable  Ext-peripheral pulses normal, no pedal edema, no clubbing or cyanosis  Skin-Warm and dry.  no hyperpigmentation, vitiligo, or suspicious lesions  Neuro -alert, oriented, normal speech, no focal findings or movement disorder noted  Neck-normal C-spine, no tenderness, full ROM without pain  Feet-no nail deformities or callus formation with good pulses noted      Results for orders placed or performed in visit on 08/28/17   AMB POC LIPID PROFILE   Result Value Ref Range    Cholesterol (POC) 188     Triglycerides (POC) 70     HDL Cholesterol (POC) 35     Non-HDL Goal (POC) 153     LDL Cholesterol (POC) 139 MG/DL    TChol/HDL Ratio (POC) 5.3        Assessment/Plan:    ICD-10-CM ICD-9-CM    1. Essential hypertension I10 401.9 amLODIPine (NORVASC) 5 mg tablet      ergocalciferol (VITAMIN D2) 50,000 unit capsule      AMB POC LIPID PROFILE      METABOLIC PANEL, COMPREHENSIVE      CBC W/O DIFF   2. Hyperlipidemia, unspecified hyperlipidemia type E78.5 272.4 amLODIPine (NORVASC) 5 mg tablet      ergocalciferol (VITAMIN D2) 50,000 unit capsule      AMB POC LIPID PROFILE   3. Multiple lung nodules R91.8 793.19 CTA CHEST W OR W WO CONT   4. Thyroid nodule E04.1 241.0 T3 TOTAL      T4, FREE      TSH 3RD GENERATION      TESTOSTERONE, FREE & TOTAL   5. Gastroesophageal reflux disease without esophagitis K21.9 530.81    6. Vitamin D deficiency E55.9 268.9    7. Eczema, unspecified type L30.9 692.9    8. Screening for colon cancer Z12.11 V76.51      Orders Placed This Encounter    CTA CHEST W OR W WO CONT     Standing Status:   Future     Standing Expiration Date:   9/28/2018     Order Specific Question:   Is Patient Allergic to Contrast Dye? Answer:   No     Order Specific Question:   Stat POC Creatinine as needed for Radiology Policy     Answer: Yes    METABOLIC PANEL, COMPREHENSIVE    CBC W/O DIFF    T3 TOTAL    T4, FREE    TSH 3RD GENERATION    TESTOSTERONE, FREE & TOTAL    AMB POC LIPID PROFILE    amLODIPine (NORVASC) 5 mg tablet     Sig: TAKE 1 TABLET BY MOUTH EVERY DAY     Dispense:  90 Tab     Refill:  3     **Patient requests 90 days supply**    ergocalciferol (VITAMIN D2) 50,000 unit capsule     Sig: TAKE 1 CAPSULE BY MOUTH EVERY WEEK     Dispense:  12 Cap     Refill:  3    diclofenac EC (VOLTAREN) 75 mg EC tablet     Sig: Take 1 Tab by mouth two (2) times a day.      Dispense:  60 Tab     Refill:  3    triamcinolone acetonide (KENALOG) 0.1 % topical cream     Sig: Apply  to affected area two (2) times a day. use thin layer     Dispense:  45 g     Refill:  11     lose weight, follow low fat diet, follow low salt diet,Take 81mg aspirin daily  Patient Instructions   MyChart Activation    Thank you for requesting access to Noemalife. Please follow the instructions below to securely access and download your online medical record. No[facility]alife allows you to send messages to your doctor, view your test results, renew your prescriptions, schedule appointments, and more. How Do I Sign Up? 1. In your internet browser, go to [email or web address]  2. Click on the First Time User? Click Here link in the Sign In box. You will be redirect to the New Member Sign Up page. 3. Enter your Noemalife Access Code exactly as it appears below. You will not need to use this code after youve completed the sign-up process. If you do not sign up before the expiration date, you must request a new code. Noemalife Access Code: [email or web address]  Expires: 2017  5:41 PM (This is the date your Noemalife access code will )    4. Enter the last four digits of your Social Security Number (xxxx) and Date of Birth (mm/dd/yyyy) as indicated and click Submit. You will be taken to the next sign-up page. 5. Create a Noemalife ID. This will be your Noemalife login ID and cannot be changed, so think of one that is secure and easy to remember. 6. Create a Noemalife password. You can change your password at any time. 7. Enter your Password Reset Question and Answer. This can be used at a later time if you forget your password. 8. Enter your e-mail address. You will receive e-mail notification when new information is available in [address] 9. Click Sign Up. You can now view and download portions of your medical record. 10. Click the Download Summary menu link to download a portable copy of your medical information.     Additional Information    If you have questions, please visit the Frequently Asked Questions section of the MyChart website at [email or web address] Remember, MyChart is NOT to be used for urgent needs. For medical emergencies, dial 911. Follow-up Disposition:  Return in about 4 weeks (around 9/25/2017), or if symptoms worsen or fail to improve. I have reviewed with the patient details of the assessment and plan and all questions were answered. Relevent patient education was performed. The most recent lab findings were reviewed with the patient. An After Visit Summary was printed and given to the patient.

## 2017-08-28 NOTE — MR AVS SNAPSHOT
Visit Information Date & Time Provider Department Dept. Phone Encounter #  
 8/28/2017 11:45 AM Larence Spatz., MD [address] [place] [address] [phone] [id number] Follow-up Instructions Return in about 4 weeks (around 9/25/2017), or if symptoms worsen or fail to improve. Upcoming Health Maintenance Date Due Pneumococcal 19-64 Medium Risk (1 of 1 - PPSV23) 7/18/1986 DTaP/Tdap/Td series (1 - Tdap) 7/18/1988 FOBT Q 1 YEAR AGE 50-75 7/18/2017 INFLUENZA AGE 9 TO ADULT 8/1/2017 Allergies as of 8/28/2017  Review Complete On: 8/28/2017 By: Larence Spatz., MD  
 No Known Allergies Current Immunizations  Reviewed on 12/27/2011 No immunizations on file. Not reviewed this visit You Were Diagnosed With   
  
 Codes Comments Essential hypertension    -  Primary ICD-10-CM: I10 
ICD-9-CM: 401.9 Hyperlipidemia, unspecified hyperlipidemia type     ICD-10-CM: E78.5 ICD-9-CM: 272.4 Multiple lung nodules     ICD-10-CM: R91.8 ICD-9-CM: 793.19 Thyroid nodule     ICD-10-CM: E04.1 ICD-9-CM: 241.0 Gastroesophageal reflux disease without esophagitis     ICD-10-CM: K21.9 ICD-9-CM: 530.81 Vitamin D deficiency     ICD-10-CM: E55.9 ICD-9-CM: 268.9 Eczema, unspecified type     ICD-10-CM: L30.9 ICD-9-CM: 692.9 Screening for colon cancer     ICD-10-CM: Z12.11 ICD-9-CM: V76.51 Vitals BP Pulse Temp Resp Height(growth percentile) Weight(growth percentile) 150/90 79 98 °F (36.7 °C) (Oral) 16 5' 9\" (1.753 m) 162 lb (73.5 kg) SpO2 BMI Smoking Status 98% 23.92 kg/m2 Current Some Day Smoker Vitals History BMI and BSA Data Body Mass Index Body Surface Area  
 23.92 kg/m 2 1.89 m 2 Preferred Pharmacy Pharmacy Name Phone [facility] [employer] [facility] Overall  [place] [phone] Your Updated Medication List  
 This list is accurate as of: 8/28/17 12:36 PM.  Always use your most recent med list. amLODIPine 5 mg tablet Commonly known as:  Orval Shauna TAKE 1 TABLET BY MOUTH EVERY DAY  
  
 atorvastatin 20 mg tablet Commonly known as:  LIPITOR Take 0.5 Tabs by mouth daily. busPIRone 10 mg tablet Commonly known as:  BUSPAR Take 1 Tab by mouth three (3) times daily (with meals) for 30 days. Indications: anxiety with depression  
  
 diclofenac EC 75 mg EC tablet Commonly known as:  VOLTAREN Take 1 Tab by mouth two (2) times a day. * ergocalciferol 50,000 unit capsule Commonly known as:  ERGOCALCIFEROL  
TAKE 1 CAPSULE BY MOUTH EVERY WEEK * ergocalciferol 50,000 unit capsule Commonly known as:  VITAMIN D2  
TAKE 1 CAPSULE BY MOUTH EVERY WEEK  
  
 ibuprofen 600 mg tablet Commonly known as:  MOTRIN Take 1 Tab by mouth every eight (8) hours as needed for Pain.  
  
 pantoprazole 40 mg tablet Commonly known as:  PROTONIX  
TAKE 1 TABLET BY MOUTH ONCE DAILY  
  
 triamcinolone acetonide 0.1 % topical cream  
Commonly known as:  KENALOG Apply  to affected area two (2) times a day. use thin layer * VENTOLIN HFA 90 mcg/actuation inhaler Generic drug:  albuterol INHALE 2 PUFFS BY MOUTH EVERY 4 HOURS AS NEEDED FOR WHEEZING  
  
 * albuterol 2.5 mg /3 mL (0.083 %) nebulizer solution Commonly known as:  PROVENTIL VENTOLIN  
USE 3 ML VIA NEBULIZER EVERY 4 HOURS AS NEEDED FOR WHEEZING  
  
 * Notice: This list has 4 medication(s) that are the same as other medications prescribed for you. Read the directions carefully, and ask your doctor or other care provider to review them with you. Prescriptions Sent to Pharmacy Refills  
 amLODIPine (NORVASC) 5 mg tablet 3 Sig: TAKE 1 TABLET BY MOUTH EVERY DAY  Class: Normal  
 Pharmacy: [facility] [address] [place] [address] Ph #: [phone]  
 ergocalciferol (VITAMIN D2) 50,000 unit capsule 3 Sig: TAKE 1 CAPSULE BY MOUTH EVERY WEEK Class: Normal  
 Pharmacy: [facility] [address] [place] [address] [address] AT [address] [place] [address] Ph #: [phone]  
 diclofenac EC (VOLTAREN) 75 mg EC tablet 3 Sig: Take 1 Tab by mouth two (2) times a day. Class: Normal  
 Pharmacy: [facility] [place], [address] Celine Rubinstein [address] Ph #: [phone] Route: Oral  
 triamcinolone acetonide (KENALOG) 0.1 % topical cream 11 Sig: Apply  to affected area two (2) times a day. use thin layer Class: Normal  
 Pharmacy: [facility] [place], [address] Rubinstein [address] Ph #: [phone] Route: Topical  
  
We Performed the Following AMB POC LIPID PROFILE [55657 CPT(R)] CBC W/O DIFF [21255 CPT(R)] METABOLIC PANEL, COMPREHENSIVE [88036 CPT(R)]   
 T3 TOTAL [79469 CPT(R)] T4, FREE [id number] CPT(R)] TESTOSTERONE, FREE & TOTAL [29506 CPT(R)] TSH 3RD GENERATION [55647 CPT(R)] Follow-up Instructions Return in about 4 weeks (around 9/25/2017), or if symptoms worsen or fail to improve. To-Do List   
 08/28/2017 Imaging:  CTA CHEST W OR W WO CONT Patient Instructions Intoohart Activation Thank you for requesting access to [facility]. Please follow the instructions below to securely access and download your online medical record. PolyServe allows you to send messages to your doctor, view your test results, renew your prescriptions, schedule appointments, and more. How Do I Sign Up? 1. In your internet browser, go to [email or web address] 
2. Click on the First Time User? Click Here link in the Sign In box. You will be redirect to the New Member Sign Up page. 3. Enter your Pixifly Access Code exactly as it appears below. You will not need to use this code after youve completed the sign-up process. If you do not sign up before the expiration date, you must request a new code. Pixifly Access Code: [email or web address] Expires: 2017  5:41 PM (This is the date your Pixifly access code will ) 4. Enter the last four digits of your Social Security Number (xxxx) and Date of Birth (mm/dd/yyyy) as indicated and click Submit. You will be taken to the next sign-up page. 5. Create a coRankt ID. This will be your Pixifly login ID and cannot be changed, so think of one that is secure and easy to remember. 6. Create a Pixifly password. You can change your password at any time. 7. Enter your Password Reset Question and Answer. This can be used at a later time if you forget your password. 8. Enter your e-mail address. You will receive e-mail notification when new information is available in [address] 9. Click Sign Up. You can now view and download portions of your medical record. 10. Click the Download Summary menu link to download a portable copy of your medical information. Additional Information If you have questions, please visit the Frequently Asked Questions section of the Pixifly website at [email or web address] Academy[employer]/. Remember, Pixifly is NOT to be used for urgent needs. For medical emergencies, dial 911. Introducing [facility]! Abdoulaye Purvis introduces Pixifly patient portal. Now you can access parts of your medical record, email your doctor's office, and request medication refills online. 1. In your internet browser, go to [email or web address] 2. Click on the First Time User? Click Here link in the Sign In box. You will see the New Member Sign Up page. 3. Enter your Pixifly Access Code exactly as it appears below. You will not need to use this code after youve completed the sign-up process.  If you do not sign up before the expiration date, you must request a new code. · Pixability Access Code: [email or web address] Expires: 9/16/2017  5:41 PM 
 
4. Enter the last four digits of your Social Security Number (xxxx) and Date of Birth (mm/dd/yyyy) as indicated and click Submit. You will be taken to the next sign-up page. 5. Create a Pixability ID. This will be your Pixability login ID and cannot be changed, so think of one that is secure and easy to remember. 6. Create a Pixability password. You can change your password at any time. 7. Enter your Password Reset Question and Answer. This can be used at a later time if you forget your password. 8. Enter your e-mail address. You will receive e-mail notification when new information is available in [address] 9. Click Sign Up. You can now view and download portions of your medical record. 10. Click the Download Summary menu link to download a portable copy of your medical information. If you have questions, please visit the Frequently Asked Questions section of the Pixability website. Remember, Pixability is NOT to be used for urgent needs. For medical emergencies, dial 911. Now available from your iPhone and Android! Please provide this summary of care documentation to your next provider. Your primary care clinician is listed as Lizeth Russell. If you have any questions after today's visit, please call [phone].

## 2017-08-29 LAB
ALBUMIN SERPL-MCNC: 5 G/DL (ref 3.5–5.5)
ALBUMIN/GLOB SERPL: 1.9 {RATIO} (ref 1.2–2.2)
ALP SERPL-CCNC: 81 IU/L (ref 39–117)
ALT SERPL-CCNC: 12 IU/L (ref 0–44)
AST SERPL-CCNC: 14 IU/L (ref 0–40)
BILIRUB SERPL-MCNC: 1.9 MG/DL (ref 0–1.2)
BUN SERPL-MCNC: 16 MG/DL (ref 6–24)
BUN/CREAT SERPL: 19 (ref 9–20)
CALCIUM SERPL-MCNC: 9.5 MG/DL (ref 8.7–10.2)
CHLORIDE SERPL-SCNC: 100 MMOL/L (ref 96–106)
CO2 SERPL-SCNC: 26 MMOL/L (ref 18–29)
CREAT SERPL-MCNC: 0.86 MG/DL (ref 0.76–1.27)
ERYTHROCYTE [DISTWIDTH] IN BLOOD BY AUTOMATED COUNT: 15.2 % (ref 12.3–15.4)
GLOBULIN SER CALC-MCNC: 2.6 G/DL (ref 1.5–4.5)
GLUCOSE SERPL-MCNC: 97 MG/DL (ref 65–99)
HCT VFR BLD AUTO: 44.8 % (ref 37.5–51)
HGB BLD-MCNC: 14.5 G/DL (ref 12.6–17.7)
MCH RBC QN AUTO: 27.6 PG (ref 26.6–33)
MCHC RBC AUTO-ENTMCNC: 32.4 G/DL (ref 31.5–35.7)
MCV RBC AUTO: 85 FL (ref 79–97)
PLATELET # BLD AUTO: 216 X10E3/UL (ref 150–379)
POTASSIUM SERPL-SCNC: 3.9 MMOL/L (ref 3.5–5.2)
PROT SERPL-MCNC: 7.6 G/DL (ref 6–8.5)
RBC # BLD AUTO: 5.25 X10E6/UL (ref 4.14–5.8)
SODIUM SERPL-SCNC: 144 MMOL/L (ref 134–144)
T3 SERPL-MCNC: 139 NG/DL (ref 71–180)
T4 FREE SERPL-MCNC: 1.56 NG/DL (ref 0.82–1.77)
TESTOST FREE SERPL-MCNC: 14.9 PG/ML (ref 7.2–24)
TESTOST SERPL-MCNC: 915 NG/DL (ref 264–916)
TSH SERPL DL<=0.005 MIU/L-ACNC: 0.58 UIU/ML (ref 0.45–4.5)
WBC # BLD AUTO: 4.9 X10E3/UL (ref 3.4–10.8)

## 2017-08-29 RX ORDER — DICLOFENAC SODIUM 75 MG/1
TABLET, DELAYED RELEASE ORAL
Qty: 180 TAB | Refills: 3 | Status: SHIPPED | OUTPATIENT
Start: 2017-08-29 | End: 2017-09-10

## 2017-09-10 ENCOUNTER — HOSPITAL ENCOUNTER (EMERGENCY)
Age: 52
Discharge: HOME | End: 2017-09-10
Attending: EMERGENCY MEDICINE | Admitting: EMERGENCY MEDICINE
Payer: MEDICAID

## 2017-09-10 ENCOUNTER — APPOINTMENT (OUTPATIENT)
Dept: GENERAL RADIOLOGY | Age: 52
End: 2017-09-10
Attending: PHYSICIAN ASSISTANT
Payer: MEDICAID

## 2017-09-10 VITALS
BODY MASS INDEX: 24.49 KG/M2 | WEIGHT: 161.6 LBS | TEMPERATURE: 98 F | DIASTOLIC BLOOD PRESSURE: 75 MMHG | HEART RATE: 90 BPM | HEIGHT: 68 IN | OXYGEN SATURATION: 100 % | SYSTOLIC BLOOD PRESSURE: 137 MMHG | RESPIRATION RATE: 16 BRPM

## 2017-09-10 DIAGNOSIS — S93.402A SPRAIN OF LEFT ANKLE, UNSPECIFIED LIGAMENT, INITIAL ENCOUNTER: Primary | ICD-10-CM

## 2017-09-10 PROCEDURE — 73610 X-RAY EXAM OF ANKLE: CPT

## 2017-09-10 PROCEDURE — 74011250637 HC RX REV CODE- 250/637: Performed by: PHYSICIAN ASSISTANT

## 2017-09-10 PROCEDURE — 99283 EMERGENCY DEPT VISIT LOW MDM: CPT

## 2017-09-10 RX ORDER — IBUPROFEN 800 MG/1
800 TABLET ORAL
Qty: 20 TAB | Refills: 0 | Status: SHIPPED | OUTPATIENT
Start: 2017-09-10 | End: 2017-09-17

## 2017-09-10 RX ORDER — HYDROCODONE BITARTRATE AND ACETAMINOPHEN 5; 325 MG/1; MG/1
1 TABLET ORAL
Status: COMPLETED | OUTPATIENT
Start: 2017-09-10 | End: 2017-09-10

## 2017-09-10 RX ORDER — HYDROCODONE BITARTRATE AND ACETAMINOPHEN 5; 325 MG/1; MG/1
1 TABLET ORAL
Qty: 15 TAB | Refills: 0 | Status: SHIPPED | OUTPATIENT
Start: 2017-09-10 | End: 2018-05-24 | Stop reason: ALTCHOICE

## 2017-09-10 RX ADMIN — HYDROCODONE BITARTRATE AND ACETAMINOPHEN 1 TABLET: 5; 325 TABLET ORAL at 16:33

## 2017-09-10 NOTE — DISCHARGE INSTRUCTIONS
Ankle Sprain: Care Instructions  Your Care Instructions    An ankle sprain can happen when you twist your ankle. The ligaments that support the ankle can get stretched and torn. Often the ankle is swollen and painful. Ankle sprains may take from several weeks to several months to heal. Usually, the more pain and swelling you have, the more severe your ankle sprain is and the longer it will take to heal. You can heal faster and regain strength in your ankle with good home treatment. It is very important to give your ankle time to heal completely, so that you do not easily hurt your ankle again. Follow-up care is a key part of your treatment and safety. Be sure to make and go to all appointments, and call your doctor if you are having problems. It's also a good idea to know your test results and keep a list of the medicines you take. How can you care for yourself at home? · Prop up your foot on pillows as much as possible for the next 3 days. Try to keep your ankle above the level of your heart. This will help reduce the swelling. · Follow your doctor's directions for wearing a splint or elastic bandage. Wrapping the ankle may help reduce or prevent swelling. · Your doctor may give you a splint, a brace, an air stirrup, or another form of ankle support to protect your ankle until it is healed. Wear it as directed while your ankle is healing. Do not remove it unless your doctor tells you to. After your ankle has healed, ask your doctor whether you should wear the brace when you exercise. · Put ice or cold packs on your injured ankle for 10 to 20 minutes at a time. Try to do this every 1 to 2 hours for the next 3 days (when you are awake) or until the swelling goes down. Put a thin cloth between the ice and your skin. · You may need to use crutches until you can walk without pain. If you do use crutches, try to bear some weight on your injured ankle if you can do so without pain.  This helps the ankle heal.  · Take pain medicines exactly as directed. ¨ If the doctor gave you a prescription medicine for pain, take it as prescribed. ¨ If you are not taking a prescription pain medicine, ask your doctor if you can take an over-the-counter medicine. · If you have been given ankle exercises to do at home, do them exactly as instructed. These can promote healing and help prevent lasting weakness. When should you call for help? Call your doctor now or seek immediate medical care if:  · Your pain is getting worse. · Your swelling is getting worse. · Your splint feels too tight or you are unable to loosen it. Watch closely for changes in your health, and be sure to contact your doctor if:  · You are not getting better after 1 week. Where can you learn more? Go to http://oskar-delma.info/. Enter H976 in the search box to learn more about \"Ankle Sprain: Care Instructions. \"  Current as of: March 21, 2017  Content Version: 11.3  © 0190-2843 [employer]. Care instructions adapted under license by [employer] (which disclaims liability or warranty for this information). If you have questions about a medical condition or this instruction, always ask your healthcare professional. [place] [address] any warranty or liability for your use of this information.

## 2017-09-10 NOTE — ED NOTES
Discharge instructions given to and reviewed with patient by BEHZAD Uriarte. Patient verbalized his understanding and was discharged home ambulatory with crutches and in NAD.

## 2017-09-10 NOTE — ED PROVIDER NOTES
HPI Comments: Renetta Villareal is a 50 y.o. male with PMhx significant for HTN, HLD, anxiety, chronic back pain, GERD, menigitis who presents ambulatory to the ED  S/p fall that occurred last night. He reports that he was ambulating up his stairs when he rolled his left ankle and fell. Pt c/o left ankle pain with associated swelling. He states that his symptoms are exacerbated with ambulation. Pt denies use of medication to modify his symptoms. He denies any numbness, weakness, neck pain, back pain, head injury, LOC, nausea, vomiting, fevers, or chills. Social history significant for: + Tobacco (3.57YWA), + EtOH, - Illicit drug use    PCP: Lalit Murrell MD    There are no other complaints, changes or physical findings at this time. Written by Dania George, ED Scribe, as dictated by Joanna Dominguez. The history is provided by the patient. No  was used.         Past Medical History:   Diagnosis Date    Anxiety     anxiety    Chronic pain     back    Gastrointestinal disorder     GERD (gastroesophageal reflux disease)     H/O Bell's palsy     left side of face tingles, intermittently, not frequently    Hyperlipidemia     Hypertension     Meningitis 2012    spinal meningitis  ; resolved as of 4/3/14    Positive PPD     CXR have been negative per pt 4/4/14    Tennis elbow 4/3/14    left        Past Surgical History:   Procedure Laterality Date    HX ORTHOPAEDIC      right knee ligament repair    HX ORTHOPAEDIC      right  hand    HX ORTHOPAEDIC      right rotator cuff         Family History:   Problem Relation Age of Onset    Hypertension Mother     Diabetes Father     Hypertension Father     Cancer Brother      prostate    Heart Disease Neg Hx     Heart Attack Neg Hx     High Cholesterol Neg Hx     Stroke Neg Hx        Social History     Social History    Marital status: SINGLE     Spouse name: N/A    Number of children: N/A    Years of education: N/A Occupational History    Not on file. Social History Main Topics    Smoking status: Current Some Day Smoker     Packs/day: 0.25    Smokeless tobacco: Never Used    Alcohol use 0.0 oz/week      Comment: occassional    Drug use: No    Sexual activity: Yes     Partners: Female     Other Topics Concern    Not on file     Social History Narrative    ** Merged History Encounter **              ALLERGIES: Review of patient's allergies indicates no known allergies. Review of Systems   Constitutional: Negative for fatigue and fever. HENT: Negative for congestion, ear pain and rhinorrhea. Eyes: Negative for pain and redness. Respiratory: Negative for cough and wheezing. Cardiovascular: Negative for chest pain and palpitations. Gastrointestinal: Negative for abdominal pain, nausea and vomiting. Genitourinary: Negative for dysuria, frequency and urgency. Musculoskeletal: Positive for arthralgias (Left ankle) and joint swelling (Left ankle). Negative for back pain, neck pain and neck stiffness. Skin: Negative for rash and wound. Neurological: Negative for weakness, light-headedness, numbness and headaches. Patient Vitals for the past 12 hrs:   Temp Pulse Resp BP SpO2   09/10/17 1621 98 °F (36.7 °C) 90 16 137/75 100 %     Physical Exam   Constitutional: He is oriented to person, place, and time. He appears well-developed and well-nourished. Non-toxic appearance. No distress. HENT:   Head: Normocephalic and atraumatic. Head is without right periorbital erythema and without left periorbital erythema. Right Ear: External ear normal.   Left Ear: External ear normal.   Nose: Nose normal.   Mouth/Throat: Uvula is midline. No trismus in the jaw. Eyes: Conjunctivae and EOM are normal. Pupils are equal, round, and reactive to light. No scleral icterus. Neck: Normal range of motion and full passive range of motion without pain.    Cardiovascular: Normal rate, regular rhythm and normal heart sounds. Pulmonary/Chest: Effort normal and breath sounds normal. No accessory muscle usage. No tachypnea. No respiratory distress. He has no decreased breath sounds. He has no wheezes. Abdominal: Soft. There is no tenderness. There is no rigidity and no guarding. Musculoskeletal: Normal range of motion. Lateral soft tissue swelling and tenderness. No 5th metatarsal tenderness. No proximal fibula tenderness   Neurological: He is alert and oriented to person, place, and time. He is not disoriented. No cranial nerve deficit or sensory deficit. GCS eye subscore is 4. GCS verbal subscore is 5. GCS motor subscore is 6. Skin: Skin is intact. No rash noted. Psychiatric: He has a normal mood and affect. His speech is normal.   Nursing note and vitals reviewed. MDM  Number of Diagnoses or Management Options  Sprain of left ankle, unspecified ligament, initial encounter:   Diagnosis management comments:   DDx: sprain, strain, fracture       Amount and/or Complexity of Data Reviewed  Tests in the radiology section of CPT®: ordered and reviewed  Review and summarize past medical records: yes    Patient Progress  Patient progress: stable    Procedures    Procedure Note - Ace Wrap Placement:  5:35 PM  Performed by: Joanna Dominguez  Neurovascularly intact prior to tx. An Ace Wrap was placed on pt's left ankle. Joint was placed in neutral position. Neurovascularly intact after tx. The procedure took 1-15 minutes, and pt tolerated well. Written by Dania Adamsonster, ED Scribe, as dictated by Joanna Dominguez. IMAGING RESULTS:  XR ANKLE LT MIN 3 V   Final Result   EXAM:  XR ANKLE LT MIN 3 V     INDICATION:  GLF. Additional history: Patient rolled right ankle last night. Acute onset of left  ankle pain and swelling  COMPARISON: None.     FINDINGS: Three views of the left ankle demonstrate no fracture or disruption of  the ankle mortise. There is no other acute osseous or articular abnormality.    Soft tissue swelling about the ankle, most pronounced laterally.     IMPRESSION  IMPRESSION:   1. No visible fracture or malalignment. 2. Soft tissue swelling in the ankle. MEDICATIONS GIVEN:  Medications   HYDROcodone-acetaminophen (NORCO) 5-325 mg per tablet 1 Tab (1 Tab Oral Given 9/10/17 6333)       IMPRESSION:  1. Sprain of left ankle, unspecified ligament, initial encounter        PLAN:  1. Discharge Medication List as of 9/10/2017  5:35 PM      START taking these medications    Details   !! ibuprofen (MOTRIN) 800 mg tablet Take 1 Tab by mouth every eight (8) hours as needed for Pain for up to 7 days. , Print, Disp-20 Tab, R-0      HYDROcodone-acetaminophen (NORCO) 5-325 mg per tablet Take 1 Tab by mouth every four (4) hours as needed for Pain. Max Daily Amount: 6 Tabs., Print, Disp-15 Tab, R-0       !! - Potential duplicate medications found. Please discuss with provider. CONTINUE these medications which have NOT CHANGED    Details   amLODIPine (NORVASC) 5 mg tablet TAKE 1 TABLET BY MOUTH EVERY DAY, Normal**Patient requests 90 days supply**Disp-90 Tab, R-3      atorvastatin (LIPITOR) 20 mg tablet Take 0.5 Tabs by mouth daily. , Normal, Disp-45 Tab, R-3      VENTOLIN HFA 90 mcg/actuation inhaler INHALE 2 PUFFS BY MOUTH EVERY 4 HOURS AS NEEDED FOR WHEEZING, Normal, Disp-1 Inhaler, R-11      !! ergocalciferol (ERGOCALCIFEROL) 50,000 unit capsule TAKE 1 CAPSULE BY MOUTH EVERY WEEK, Normal, Disp-12 Cap, R-3      !! ergocalciferol (VITAMIN D2) 50,000 unit capsule TAKE 1 CAPSULE BY MOUTH EVERY WEEK, Normal, Disp-12 Cap, R-3      !! ibuprofen (MOTRIN) 600 mg tablet Take 1 Tab by mouth every eight (8) hours as needed for Pain., Normal, Disp-30 Tab, R-0      albuterol (PROVENTIL VENTOLIN) 2.5 mg /3 mL (0.083 %) nebulizer solution USE 3 ML VIA NEBULIZER EVERY 4 HOURS AS NEEDED FOR WHEEZING, Normal**Patient requests 90 days supply**Disp-100 Each, R-11      busPIRone (BUSPAR) 10 mg tablet Take 1 Tab by mouth three (3) times daily (with meals) for 30 days. Indications: anxiety with depression, Print, Disp-90 Tab, R-0       !! - Potential duplicate medications found. Please discuss with provider. 2.   Follow-up Information     Follow up With Details Comments [id number] Doretha Yu MD Schedule an appointment as soon as possible for a visit As needed [facility]  [address]      Cecilia Zayas, DO Schedule an appointment as soon as possible for a visit ORTHO: as needed if symptoms persist [facility] [phone]          Return to ED if worse     DISCHARGE NOTE  5:30 PM  The patient has been re-evaluated and is ready for discharge. Reviewed available results with patient. Counseled patient on diagnosis and care plan. Patient has expressed understanding, and all questions have been answered. Patient agrees with plan and agrees to follow up as recommended, or return to the ED if their symptoms worsen. Discharge instructions have been provided and explained to the patient, along with reasons to return to the ED. This note is prepared by Romie Cheney, acting as Scribe for Yenni Stephen. CINDY Maguire: The scribe's documentation has been prepared under my direction and personally reviewed by me in its entirety. I confirm that the note above accurately reflects all work, treatment, procedures, and medical decision making performed by me.

## 2017-09-14 LAB — HEMOCCULT STL QL IA: NEGATIVE

## 2017-12-18 ENCOUNTER — OFFICE VISIT (OUTPATIENT)
Dept: INTERNAL MEDICINE CLINIC | Age: 52
End: 2017-12-18

## 2017-12-18 VITALS
HEIGHT: 68 IN | BODY MASS INDEX: 25.16 KG/M2 | OXYGEN SATURATION: 98 % | HEART RATE: 68 BPM | WEIGHT: 166 LBS | SYSTOLIC BLOOD PRESSURE: 110 MMHG | TEMPERATURE: 97.9 F | RESPIRATION RATE: 16 BRPM | DIASTOLIC BLOOD PRESSURE: 74 MMHG

## 2017-12-18 DIAGNOSIS — M25.50 ARTHRALGIA, UNSPECIFIED JOINT: ICD-10-CM

## 2017-12-18 DIAGNOSIS — I10 ESSENTIAL HYPERTENSION: Primary | ICD-10-CM

## 2017-12-18 DIAGNOSIS — M25.579 ARTHRALGIA OF FOOT, UNSPECIFIED LATERALITY: ICD-10-CM

## 2017-12-18 DIAGNOSIS — E78.5 HYPERLIPIDEMIA, UNSPECIFIED HYPERLIPIDEMIA TYPE: ICD-10-CM

## 2017-12-18 DIAGNOSIS — Z12.11 SCREENING FOR COLON CANCER: ICD-10-CM

## 2017-12-18 DIAGNOSIS — R35.0 FREQUENCY OF MICTURITION: ICD-10-CM

## 2017-12-18 DIAGNOSIS — R91.8 MULTIPLE PULMONARY NODULES: ICD-10-CM

## 2017-12-18 LAB
CHOLEST SERPL-MCNC: 160 MG/DL
HDLC SERPL-MCNC: 35 MG/DL
LDL CHOLESTEROL POC: 125 MG/DL
NON-HDL CHOLESTEROL, 011976: 112
TCHOL/HDL RATIO (POC): 4.5
TRIGL SERPL-MCNC: 64 MG/DL

## 2017-12-18 RX ORDER — ALBUTEROL SULFATE 90 UG/1
AEROSOL, METERED RESPIRATORY (INHALATION)
Qty: 1 INHALER | Refills: 11 | Status: SHIPPED | OUTPATIENT
Start: 2017-12-18 | End: 2019-01-22 | Stop reason: SDUPTHER

## 2017-12-18 NOTE — PATIENT INSTRUCTIONS
Cactus Activation    Thank you for requesting access to Cactus. Please follow the instructions below to securely access and download your online medical record. Cactus allows you to send messages to your doctor, view your test results, renew your prescriptions, schedule appointments, and more. How Do I Sign Up? 1. In your internet browser, go to [email or web address]  2. Click on the First Time User? Click Here link in the Sign In box. You will be redirect to the New Member Sign Up page. 3. Enter your Cactus Access Code exactly as it appears below. You will not need to use this code after youve completed the sign-up process. If you do not sign up before the expiration date, you must request a new code. Cactus Access Code: [email or web address]  Expires: 3/18/2018 10:24 AM (This is the date your Cactus access code will )    4. Enter the last four digits of your Social Security Number (xxxx) and Date of Birth (mm/dd/yyyy) as indicated and click Submit. You will be taken to the next sign-up page. 5. Create a Cactus ID. This will be your Cactus login ID and cannot be changed, so think of one that is secure and easy to remember. 6. Create a Cactus password. You can change your password at any time. 7. Enter your Password Reset Question and Answer. This can be used at a later time if you forget your password. 8. Enter your e-mail address. You will receive e-mail notification when new information is available in [address] 9. Click Sign Up. You can now view and download portions of your medical record. 10. Click the Download Summary menu link to download a portable copy of your medical information. Additional Information    If you have questions, please visit the Frequently Asked Questions section of the Cactus website at [email or web address] Remember, Cactus is NOT to be used for urgent needs. For medical emergencies, dial 911.

## 2017-12-18 NOTE — MR AVS SNAPSHOT
Visit Information Date & Time Provider Department Dept. Phone Encounter #  
 12/18/2017 10:00 AM Jessenia Davila MD [address] [place] [address] [phone] [id number] Follow-up Instructions Return in about 3 months (around 3/18/2018), or if symptoms worsen or fail to improve. Upcoming Health Maintenance Date Due FOBT Q 1 YEAR AGE 50-75 9/7/2018 DTaP/Tdap/Td series (2 - Td) 12/18/2027 Allergies as of 12/18/2017  Review Complete On: 12/18/2017 By: Jessenia Davila MD  
 No Known Allergies Current Immunizations  Reviewed on 12/27/2011 No immunizations on file. Not reviewed this visit You Were Diagnosed With   
  
 Codes Comments Essential hypertension    -  Primary ICD-10-CM: I10 
ICD-9-CM: 401.9 Hyperlipidemia, unspecified hyperlipidemia type     ICD-10-CM: E78.5 ICD-9-CM: 272.4 Frequency of micturition     ICD-10-CM: R35.0 ICD-9-CM: 573. 39 Arthralgia, unspecified joint     ICD-10-CM: M25.50 ICD-9-CM: 719.40 Multiple pulmonary nodules     ICD-10-CM: R91.8 ICD-9-CM: 793.19 Screening for colon cancer     ICD-10-CM: Z12.11 ICD-9-CM: V76.51 Arthralgia of foot, unspecified laterality     ICD-10-CM: M25.579 ICD-9-CM: 719.47 Vitals BP Pulse Temp Resp Height(growth percentile) Weight(growth percentile) 110/74 68 97.9 °F (36.6 °C) (Oral) 16 5' 8\" (1.727 m) 166 lb (75.3 kg) SpO2 BMI Smoking Status 98% 25.24 kg/m2 Current Some Day Smoker BMI and BSA Data Body Mass Index Body Surface Area  
 25.24 kg/m 2 1.9 m 2 Preferred Pharmacy Pharmacy Name Phone [facility] [employer] [facility] Gomes  [place] [phone] Your Updated Medication List  
  
   
This list is accurate as of: 12/18/17 12:53 PM.  Always use your most recent med list.  
  
  
  
  
 * albuterol 2.5 mg /3 mL (0.083 %) nebulizer solution Commonly known as:  PROVENTIL VENTOLIN  
USE 3 ML VIA NEBULIZER EVERY 4 HOURS AS NEEDED FOR WHEEZING  
  
 * albuterol 90 mcg/actuation inhaler Commonly known as:  VENTOLIN HFA INHALE 2 PUFFS BY MOUTH EVERY 4 HOURS AS NEEDED FOR WHEEZING  
  
 amLODIPine 5 mg tablet Commonly known as:  Da Silva Mullet TAKE 1 TABLET BY MOUTH EVERY DAY  
  
 atorvastatin 20 mg tablet Commonly known as:  LIPITOR Take 0.5 Tabs by mouth daily. busPIRone 10 mg tablet Commonly known as:  BUSPAR Take 1 Tab by mouth three (3) times daily (with meals) for 30 days. Indications: anxiety with depression * ergocalciferol 50,000 unit capsule Commonly known as:  ERGOCALCIFEROL  
TAKE 1 CAPSULE BY MOUTH EVERY WEEK * ergocalciferol 50,000 unit capsule Commonly known as:  VITAMIN D2  
TAKE 1 CAPSULE BY MOUTH EVERY WEEK HYDROcodone-acetaminophen 5-325 mg per tablet Commonly known as:  Kaleb Dolphin Take 1 Tab by mouth every four (4) hours as needed for Pain. Max Daily Amount: 6 Tabs. ibuprofen 600 mg tablet Commonly known as:  MOTRIN Take 1 Tab by mouth every eight (8) hours as needed for Pain. * Notice: This list has 4 medication(s) that are the same as other medications prescribed for you. Read the directions carefully, and ask your doctor or other care provider to review them with you. Prescriptions Sent to Pharmacy Refills  
 albuterol (VENTOLIN HFA) 90 mcg/actuation inhaler 11 Sig: INHALE 2 PUFFS BY MOUTH EVERY 4 HOURS AS NEEDED FOR WHEEZING Class: Normal  
 Pharmacy: [facility] [address] Ph #: [phone] We Performed the Following AMB POC LIPID PROFILE [19514 CPT(R)] PSA, DIAGNOSTIC (PROSTATE SPECIFIC AG) [id number] CPT(R)] REFERRAL TO GASTROENTEROLOGY [GVL98 Custom] RHEUMATOID FACTOR, QL [id number] CPT(R)] Follow-up Instructions Return in about 3 months (around 3/18/2018), or if symptoms worsen or fail to improve. To-Do List   
 2017 Imaging:  CTA CHEST W OR W WO CONT   
  
 2017 Lab:  SED RATE (ESR) Referral Information Referral ID Referred By Referred To  
  
 [id number] Tala Wright MD   
   [address] [facility] [address] [place] Phone: [phone] Fax: [phone] Visits Status Start Date End Date 1 New Request 17 If your referral has a status of pending review or denied, additional information will be sent to support the outcome of this decision. Patient Instructions Siamosocihart Activation Thank you for requesting access to P[employer]ce. Please follow the instructions below to securely access and download your online medical record. P[employer]ce allows you to send messages to your doctor, view your test results, renew your prescriptions, schedule appointments, and more. How Do I Sign Up? 1. In your internet browser, go to [email or web address] 
2. Click on the First Time User? Click Here link in the Sign In box. You will be redirect to the New Member Sign Up page. 3. Enter your Pownce Access Code exactly as it appears below. You will not need to use this code after youve completed the sign-up process. If you do not sign up before the expiration date, you must request a new code. Pownce Access Code: [email or web address] Expires: 3/18/2018 10:24 AM (This is the date your Pownce access code will ) 4. Enter the last four digits of your Social Security Number (xxxx) and Date of Birth (mm/dd/yyyy) as indicated and click Submit. You will be taken to the next sign-up page. 5. Create a Pownce ID. This will be your Pownce login ID and cannot be changed, so think of one that is secure and easy to remember. 6. Create a [employer] password. You can change your password at any time. 7. Enter your Password Reset Question and Answer. This can be used at a later time if you forget your password. 8. Enter your e-mail address. You will receive e-mail notification when new information is available in [address] 9. Click Sign Up. You can now view and download portions of your medical record. 10. Click the Download Summary menu link to download a portable copy of your medical information. Additional Information If you have questions, please visit the Frequently Asked Questions section of the [employer] website at [email or web address] Remember, [employer] is NOT to be used for urgent needs. For medical emergencies, dial 911. Introducing [facility]! [facility] introduces [employer] patient portal. Now you can access parts of your medical record, email your doctor's office, and request medication refills online. 1. In your internet browser, go to [email or web address] 2. Click on the First Time User? Click Here link in the Sign In box. You will see the New Member Sign Up page. 3. Enter your [employer] Access Code exactly as it appears below. You will not need to use this code after youve completed the sign-up process. If you do not sign up before the expiration date, you must request a new code. · [employer] Access Code: [email or web address] Expires: 3/18/2018 10:24 AM 
 
4. Enter the last four digits of your Social Security Number (xxxx) and Date of Birth (mm/dd/yyyy) as indicated and click Submit. You will be taken to the next sign-up page. 5. Create a [employer] ID. This will be your [employer] login ID and cannot be changed, so think of one that is secure and easy to remember. 6. Create a [employer] password. You can change your password at any time. 7. Enter your Password Reset Question and Answer. This can be used at a later time if you forget your password. 8. Enter your e-mail address. You will receive e-mail notification when new information is available in [address] 9. Click Sign Up. You can now view and download portions of your medical record. 10. Click the Download Summary menu link to download a portable copy of your medical information. If you have questions, please visit the Frequently Asked Questions section of the [facility] website. Remember, [facility] is NOT to be used for urgent needs. For medical emergencies, dial 911. Now available from your iPhone and Android! Please provide this summary of care documentation to your next provider. Your primary care clinician is listed as Etelvina Marcial. If you have any questions after today's visit, please call [phone].

## 2017-12-18 NOTE — PROGRESS NOTES
Indiana Alberto is a 50 y.o. male and presents with Joint Pain and Hypertension  . Subjective:  Hypertension Review:  The patient has essential hypertension  Diet and Lifestyle: generally follows a  low sodium diet, exercises sporadically  Home BP Monitoring: is not measured at home. Pertinent ROS: taking medications as instructed, no medication side effects noted, no TIA's, no chest pain on exertion, no dyspnea on exertion, no swelling of ankles. Dyslipidemia Review:  Patient presents for evaluation of lipids. Compliance with treatment thus far has been excellent. A repeat fasting lipid profile was done. The patient does not use medications that may worsen dyslipidemias (corticosteroids, progestins, anabolic steroids, diuretics, beta-blockers, amiodarone, cyclosporine, olanzapine). The patient exercises some    He had a cta greater than 6 months ago revealing lung nodules as a well as a thyroid nodule  He needs a repeat test    Review of Systems  Constitutional: negative for fevers, chills, anorexia and weight loss  Eyes:   negative for visual disturbance and irritation  ENT:   negative for tinnitus,sore throat,nasal congestion,ear pains. hoarseness  Respiratory:  negative for cough, hemoptysis, dyspnea,wheezing  CV:   negative for chest pain, palpitations, lower extremity edema  GI:   negative for nausea, vomiting, diarrhea, abdominal pain,melena  Endo:               negative for polyuria,polydipsia,polyphagia,heat intolerance  Genitourinary: negative for frequency, dysuria and hematuria  Integument:  negative for rash and pruritus  Hematologic:  negative for easy bruising and gum/nose bleeding  Musculoskel: myalgias, arthralgias,joint pain  Neurological:  negative for headaches, dizziness, vertigo, memory problems and gait   Behavl/Psych: negative for feelings of anxiety, depression, mood changes    Past Medical History:   Diagnosis Date    Anxiety     anxiety    Chronic pain     back    Gastrointestinal disorder     GERD (gastroesophageal reflux disease)     H/O Bell's palsy     left side of face tingles, intermittently, not frequently    Hyperlipidemia     Hypertension     Meningitis 2012    spinal meningitis  ; resolved as of 4/3/14    Positive PPD     CXR have been negative per pt 4/4/14    Tennis elbow 4/3/14    left      Past Surgical History:   Procedure Laterality Date    HX ORTHOPAEDIC      right knee ligament repair    HX ORTHOPAEDIC      right  hand    HX ORTHOPAEDIC      right rotator cuff     Social History     Social History    Marital status: SINGLE     Spouse name: N/A    Number of children: N/A    Years of education: N/A     Social History Main Topics    Smoking status: Current Some Day Smoker     Packs/day: 0.25    Smokeless tobacco: Never Used    Alcohol use 0.0 oz/week      Comment: occassional    Drug use: No    Sexual activity: Yes     Partners: Female     Other Topics Concern    None     Social History Narrative    ** Merged History Encounter **          Family History   Problem Relation Age of Onset    Hypertension Mother     Diabetes Father     Hypertension Father     Cancer Brother      prostate    Heart Disease Neg Hx     Heart Attack Neg Hx     High Cholesterol Neg Hx     Stroke Neg Hx      Current Outpatient Prescriptions   Medication Sig Dispense Refill    albuterol (VENTOLIN HFA) 90 mcg/actuation inhaler INHALE 2 PUFFS BY MOUTH EVERY 4 HOURS AS NEEDED FOR WHEEZING 1 Inhaler 11    HYDROcodone-acetaminophen (NORCO) 5-325 mg per tablet Take 1 Tab by mouth every four (4) hours as needed for Pain. Max Daily Amount: 6 Tabs. 15 Tab 0    amLODIPine (NORVASC) 5 mg tablet TAKE 1 TABLET BY MOUTH EVERY DAY 90 Tab 3    ergocalciferol (VITAMIN D2) 50,000 unit capsule TAKE 1 CAPSULE BY MOUTH EVERY WEEK 12 Cap 3    ibuprofen (MOTRIN) 600 mg tablet Take 1 Tab by mouth every eight (8) hours as needed for Pain.  30 Tab 0    albuterol (PROVENTIL VENTOLIN) 2.5 mg /3 mL (0.083 %) nebulizer solution USE 3 ML VIA NEBULIZER EVERY 4 HOURS AS NEEDED FOR WHEEZING 100 Each 11    ergocalciferol (ERGOCALCIFEROL) 50,000 unit capsule TAKE 1 CAPSULE BY MOUTH EVERY WEEK 12 Cap 3    atorvastatin (LIPITOR) 20 mg tablet Take 0.5 Tabs by mouth daily. 45 Tab 3    busPIRone (BUSPAR) 10 mg tablet Take 1 Tab by mouth three (3) times daily (with meals) for 30 days. Indications: anxiety with depression 90 Tab 0     No Known Allergies    Objective:  Visit Vitals    /74    Pulse 68    Temp 97.9 °F (36.6 °C) (Oral)    Resp 16    Ht 5' 8\" (1.727 m)    Wt 166 lb (75.3 kg)    SpO2 98%    BMI 25.24 kg/m2     Physical Exam:   General appearance - alert, well appearing, and in no distress  Mental status - alert, oriented to person, place, and time  EYE-FERNANDA, EOMI, corneas normal, no foreign bodies  ENT-ENT exam normal, no neck nodes or sinus tenderness  Nose - normal and patent, no erythema, discharge or polyps  Mouth - mucous membranes moist, pharynx normal without lesions  Neck - supple, no significant adenopathy   Chest - clear to auscultation, no wheezes, rales or rhonchi, symmetric air entry   Heart - normal rate, regular rhythm, normal S1, S2, no murmurs, rubs, clicks or gallops   Abdomen - soft, nontender, nondistended, no masses or organomegaly  Lymph- no adenopathy palpable  Ext-peripheral pulses normal, no pedal edema, no clubbing or cyanosis  Skin-Warm and dry.  no hyperpigmentation, vitiligo, or suspicious lesions  Neuro -alert, oriented, normal speech, no focal findings or movement disorder noted  Neck-normal C-spine, no tenderness, full ROM without pain  Feet-no nail deformities or callus formation with good pulses noted      Results for orders placed or performed in visit on 12/18/17   AMB POC LIPID PROFILE   Result Value Ref Range    Cholesterol (POC) 160     Triglycerides (POC) 64     HDL Cholesterol (POC) 35     Non-HDL Cholesterol 112     LDL Cholesterol (POC) 125 MG/DL    TChol/HDL Ratio (POC) 4.5        Assessment/Plan:    ICD-10-CM ICD-9-CM    1. Essential hypertension I10 401.9 AMB POC LIPID PROFILE   2. Hyperlipidemia, unspecified hyperlipidemia type E78.5 272.4 AMB POC LIPID PROFILE   3. Frequency of micturition R35.0 788.41 PSA, DIAGNOSTIC (PROSTATE SPECIFIC AG)   4. Arthralgia, unspecified joint M25.50 719.40    5. Multiple pulmonary nodules R91.8 793.19 CTA CHEST W OR W WO CONT   6. Screening for colon cancer Z12.11 V76.51 REFERRAL TO GASTROENTEROLOGY   7. Arthralgia of foot, unspecified laterality M25.579 719.47 RHEUMATOID FACTOR, QL      SED RATE (ESR)     Orders Placed This Encounter    CTA CHEST W OR W WO CONT     Standing Status:   Future     Standing Expiration Date:   1/18/2019     Order Specific Question:   Is Patient Allergic to Contrast Dye? Answer:   No     Order Specific Question:   STAT Creatinine as indicated     Answer: Yes    PROSTATE SPECIFIC AG    RHEUMATOID FACTOR, QL    SED RATE (ESR)     Standing Status:   Future     Standing Expiration Date:   6/17/2018    REFERRAL TO GASTROENTEROLOGY     Referral Priority:   Routine     Referral Type:   Consultation     Referral Reason:   Specialty Services Required     Referred to Provider:   Gela Douglas MD     Number of Visits Requested:   1    AMB POC LIPID PROFILE    albuterol (VENTOLIN HFA) 90 mcg/actuation inhaler     Sig: INHALE 2 PUFFS BY MOUTH EVERY 4 HOURS AS NEEDED FOR WHEEZING     Dispense:  1 Inhaler     Refill:  11     lose weight, follow low fat diet, follow low salt diet,Take 81mg aspirin daily  Patient Instructions   [employer] Activation    Thank you for requesting access to [employer]. Please follow the instructions below to securely access and download your online medical record. [employer] allows you to send messages to your doctor, view your test results, renew your prescriptions, schedule appointments, and more. How Do I Sign Up? 1.  In your internet browser, go to [email or web address]  2. Click on the First Time User? Click Here link in the Sign In box. You will be redirect to the New Member Sign Up page. 3. Enter your BBspace Access Code exactly as it appears below. You will not need to use this code after youve completed the sign-up process. If you do not sign up before the expiration date, you must request a new code. BBspace Access Code: [email or web address]  Expires: 3/18/2018 10:24 AM (This is the date your BBspace access code will )    4. Enter the last four digits of your Social Security Number (xxxx) and Date of Birth (mm/dd/yyyy) as indicated and click Submit. You will be taken to the next sign-up page. 5. Create a Desire2Learnt ID. This will be your BBspace login ID and cannot be changed, so think of one that is secure and easy to remember. 6. Create a BBspace password. You can change your password at any time. 7. Enter your Password Reset Question and Answer. This can be used at a later time if you forget your password. 8. Enter your e-mail address. You will receive e-mail notification when new information is available in [address] 9. Click Sign Up. You can now view and download portions of your medical record. 10. Click the Download Summary menu link to download a portable copy of your medical information. Additional Information    If you have questions, please visit the Frequently Asked Questions section of the BBspace website at [email or web address] Remember, BBspace is NOT to be used for urgent needs. For medical emergencies, dial 911. Follow-up Disposition:  Return in about 3 months (around 3/18/2018), or if symptoms worsen or fail to improve. I have reviewed with the patient details of the assessment and plan and all questions were answered. Relevent patient education was performed. The most recent lab findings were reviewed with the patient.     An After Visit Summary was printed and given to the patient.

## 2017-12-19 LAB
ERYTHROCYTE [SEDIMENTATION RATE] IN BLOOD BY WESTERGREN METHOD: 8 MM/HR (ref 0–30)
PSA SERPL-MCNC: 2.6 NG/ML (ref 0–4)
RHEUMATOID FACT SERPL-ACNC: <10 IU/ML (ref 0–13.9)

## 2017-12-28 ENCOUNTER — HOSPITAL ENCOUNTER (OUTPATIENT)
Dept: CT IMAGING | Age: 52
Discharge: HOME | End: 2017-12-28
Attending: INTERNAL MEDICINE
Payer: MEDICAID

## 2017-12-28 DIAGNOSIS — R91.8 MULTIPLE LUNG NODULES: ICD-10-CM

## 2017-12-28 PROCEDURE — 74011636320 HC RX REV CODE- 636/320: Performed by: INTERNAL MEDICINE

## 2017-12-28 PROCEDURE — 71260 CT THORAX DX C+: CPT

## 2017-12-28 PROCEDURE — 74011250636 HC RX REV CODE- 250/636: Performed by: INTERNAL MEDICINE

## 2017-12-28 RX ORDER — SODIUM CHLORIDE 9 MG/ML
50 INJECTION, SOLUTION INTRAVENOUS
Status: COMPLETED | OUTPATIENT
Start: 2017-12-28 | End: 2017-12-28

## 2017-12-28 RX ORDER — SODIUM CHLORIDE 0.9 % (FLUSH) 0.9 %
10 SYRINGE (ML) INJECTION
Status: COMPLETED | OUTPATIENT
Start: 2017-12-28 | End: 2017-12-28

## 2017-12-28 RX ADMIN — SODIUM CHLORIDE 50 ML/HR: 900 INJECTION, SOLUTION INTRAVENOUS at 15:12

## 2017-12-28 RX ADMIN — IOPAMIDOL 100 ML: 612 INJECTION, SOLUTION INTRAVENOUS at 15:12

## 2017-12-28 RX ADMIN — Medication 10 ML: at 15:12

## 2018-05-24 ENCOUNTER — OFFICE VISIT (OUTPATIENT)
Dept: INTERNAL MEDICINE CLINIC | Age: 53
End: 2018-05-24

## 2018-05-24 VITALS
TEMPERATURE: 98.3 F | DIASTOLIC BLOOD PRESSURE: 76 MMHG | WEIGHT: 164 LBS | OXYGEN SATURATION: 98 % | BODY MASS INDEX: 24.86 KG/M2 | RESPIRATION RATE: 16 BRPM | HEIGHT: 68 IN | HEART RATE: 81 BPM | SYSTOLIC BLOOD PRESSURE: 120 MMHG

## 2018-05-24 DIAGNOSIS — E78.5 HYPERLIPIDEMIA, UNSPECIFIED HYPERLIPIDEMIA TYPE: ICD-10-CM

## 2018-05-24 DIAGNOSIS — E55.9 VITAMIN D DEFICIENCY: ICD-10-CM

## 2018-05-24 DIAGNOSIS — I10 ESSENTIAL HYPERTENSION: Primary | ICD-10-CM

## 2018-05-24 DIAGNOSIS — Z12.11 SCREENING FOR COLON CANCER: ICD-10-CM

## 2018-05-24 LAB
CHOLEST SERPL-MCNC: 158 MG/DL
HDLC SERPL-MCNC: 35 MG/DL
LDL CHOLESTEROL POC: 102 MG/DL
NON-HDL CHOLESTEROL, 011976: 123
TCHOL/HDL RATIO (POC): 4.5
TRIGL SERPL-MCNC: 106 MG/DL

## 2018-05-24 RX ORDER — AMLODIPINE BESYLATE 5 MG/1
TABLET ORAL
Qty: 90 TAB | Refills: 1 | Status: SHIPPED | OUTPATIENT
Start: 2018-05-24 | End: 2018-12-29

## 2018-05-24 RX ORDER — ERGOCALCIFEROL 1.25 MG/1
CAPSULE ORAL
Qty: 12 CAP | Refills: 3 | Status: SHIPPED | OUTPATIENT
Start: 2018-05-24 | End: 2018-10-18 | Stop reason: SDUPTHER

## 2018-05-24 NOTE — PROGRESS NOTES
Sabiha Tran is a 50 y.o. male and presents with Cholesterol Problem and Hypertension  . Subjective:  Hypertension Review:  The patient has hypertension . Diet and Lifestyle: generally follows a low sodium diet, exercises sporadically  Home BP Monitoring: is not measured at home. Pertinent ROS: taking medications as instructed, no medication side effects noted, no TIA's, no chest pain on exertion, no dyspnea on exertion, no swelling of ankles. Dyslipidemia Review:  Patient presents for evaluation of lipids. Compliance with treatment thus far has been excellent. A repeat fasting lipid profile was done. The patient does not use medications that may worsen dyslipidemias . The patient exercises sporadically. Health maintenance suggests the needs for a colonoscopy    He has a history of d deficiency      Review of Systems  Constitutional: negative for fevers, chills, anorexia and weight loss  Eyes:   negative for visual disturbance and irritation  ENT:   negative for tinnitus,sore throat,nasal congestion,ear pains. hoarseness  Respiratory:  negative for cough, hemoptysis, dyspnea,wheezing  CV:   negative for chest pain, palpitations, lower extremity edema  GI:   negative for nausea, vomiting, diarrhea, abdominal pain,melena  Endo:               negative for polyuria,polydipsia,polyphagia,heat intolerance  Genitourinary: negative for frequency, dysuria and hematuria  Integument:  negative for rash and pruritus  Hematologic:  negative for easy bruising and gum/nose bleeding  Musculoskel: negative for myalgias, arthralgias, back pain, muscle weakness, joint pain  Neurological:  negative for headaches, dizziness, vertigo, memory problems and gait   Behavl/Psych: negative for feelings of anxiety, depression, mood changes    Past Medical History:   Diagnosis Date    Anxiety     anxiety    Chronic pain     back    Gastrointestinal disorder     GERD (gastroesophageal reflux disease)     H/O Bell's palsy left side of face tingles, intermittently, not frequently    Hyperlipidemia     Hypertension     Meningitis 2012    spinal meningitis  ; resolved as of 4/3/14    Positive PPD     CXR have been negative per pt 4/4/14    Tennis elbow 4/3/14    left      Past Surgical History:   Procedure Laterality Date    HX ORTHOPAEDIC      right knee ligament repair    HX ORTHOPAEDIC      right  hand    HX ORTHOPAEDIC      right rotator cuff     Social History     Social History    Marital status: SINGLE     Spouse name: N/A    Number of children: N/A    Years of education: N/A     Social History Main Topics    Smoking status: Current Some Day Smoker     Packs/day: 0.25    Smokeless tobacco: Never Used    Alcohol use 0.0 oz/week      Comment: occassional    Drug use: No    Sexual activity: Yes     Partners: Female     Other Topics Concern    Not on file     Social History Narrative    ** Merged History Encounter **          Family History   Problem Relation Age of Onset    Hypertension Mother     Diabetes Father     Hypertension Father     Cancer Brother      prostate    Heart Disease Neg Hx     Heart Attack Neg Hx     High Cholesterol Neg Hx     Stroke Neg Hx      Current Outpatient Prescriptions   Medication Sig Dispense Refill    amLODIPine (NORVASC) 5 mg tablet TAKE 1 TABLET BY MOUTH EVERY DAY 90 Tab 1    ergocalciferol (VITAMIN D2) 50,000 unit capsule TAKE 1 CAPSULE BY MOUTH EVERY WEEK 12 Cap 3    albuterol (VENTOLIN HFA) 90 mcg/actuation inhaler INHALE 2 PUFFS BY MOUTH EVERY 4 HOURS AS NEEDED FOR WHEEZING 1 Inhaler 11    albuterol (PROVENTIL VENTOLIN) 2.5 mg /3 mL (0.083 %) nebulizer solution USE 3 ML VIA NEBULIZER EVERY 4 HOURS AS NEEDED FOR WHEEZING 100 Each 11    ergocalciferol (ERGOCALCIFEROL) 50,000 unit capsule TAKE 1 CAPSULE BY MOUTH EVERY WEEK 12 Cap 3    ibuprofen (MOTRIN) 600 mg tablet Take 1 Tab by mouth every eight (8) hours as needed for Pain.  30 Tab 0     No Known Allergies    Objective:  Visit Vitals    /76    Pulse 81    Temp 98.3 °F (36.8 °C) (Oral)    Resp 16    Ht 5' 8\" (1.727 m)    Wt 164 lb (74.4 kg)    SpO2 98%    BMI 24.94 kg/m2     Physical Exam:   General appearance - alert, well appearing, and in no distress  Mental status - alert, oriented to person, place, and time  EYE-FERNANDA, EOMI, corneas normal, no foreign bodies  ENT-ENT exam normal, no neck nodes or sinus tenderness  Nose - normal and patent, no erythema, discharge or polyps  Mouth - mucous membranes moist, pharynx normal without lesions  Neck - supple, no significant adenopathy   Chest - clear to auscultation, no wheezes, rales or rhonchi, symmetric air entry   Heart - normal rate, regular rhythm, normal S1, S2, no murmurs, rubs, clicks or gallops   Abdomen - soft, nontender, nondistended, no masses or organomegaly  Lymph- no adenopathy palpable  Ext-peripheral pulses normal, no pedal edema, no clubbing or cyanosis  Skin-Warm and dry. no hyperpigmentation, vitiligo, or suspicious lesions  Neuro -alert, oriented, normal speech, no focal findings or movement disorder noted  Neck-normal C-spine, no tenderness, full ROM without pain  Feet-no nail deformities or callus formation with good pulses noted      Results for orders placed or performed in visit on 05/24/18   AMB POC LIPID PROFILE   Result Value Ref Range    Cholesterol (POC) 158     Triglycerides (POC) 106     HDL Cholesterol (POC) 35     Non-HDL Cholesterol 123     LDL Cholesterol (POC) 102 MG/DL    TChol/HDL Ratio (POC) 4.5        Assessment/Plan:    ICD-10-CM ICD-9-CM    1. Essential hypertension I10 401.9 AMB POC LIPID PROFILE      amLODIPine (NORVASC) 5 mg tablet      ergocalciferol (VITAMIN D2) 50,000 unit capsule   2. Hyperlipidemia, unspecified hyperlipidemia type E78.5 272.4 AMB POC LIPID PROFILE      amLODIPine (NORVASC) 5 mg tablet      ergocalciferol (VITAMIN D2) 50,000 unit capsule   3.  Vitamin D deficiency E55.9 268.9    4. Screening for colon cancer Z12.11 V76.51 REFERRAL TO GASTROENTEROLOGY     Orders Placed This Encounter    REFERRAL TO GASTROENTEROLOGY     Referral Priority:   Routine     Referral Type:   Consultation     Referral Reason:   Specialty Services Required     Referral Location:   [facility]     Referred to Provider:   Sonam Posey MD     Number of Visits Requested:   1    AMB POC LIPID PROFILE    amLODIPine (NORVASC) 5 mg tablet     Sig: TAKE 1 TABLET BY MOUTH EVERY DAY     Dispense:  90 Tab     Refill:  1     **Patient requests 90 days supply**    ergocalciferol (VITAMIN D2) 50,000 unit capsule     Sig: TAKE 1 CAPSULE BY MOUTH EVERY WEEK     Dispense:  12 Cap     Refill:  3     lose weight, follow low fat diet, follow low salt diet,Take 81mg aspirin daily  Patient Instructions   [employer] Activation    Thank you for requesting access to [employer]. Please follow the instructions below to securely access and download your online medical record. [employer] allows you to send messages to your doctor, view your test results, renew your prescriptions, schedule appointments, and more. How Do I Sign Up? 1. In your internet browser, go to [email or web address]  2. Click on the First Time User? Click Here link in the Sign In box. You will be redirect to the New Member Sign Up page. 3. Enter your [employer] Access Code exactly as it appears below. You will not need to use this code after youve completed the sign-up process. If you do not sign up before the expiration date, you must request a new code. [employer] Access Code: [email or web address]  Expires: 2018  5:24 AM (This is the date your [employer] access code will )    4. Enter the last four digits of your Social Security Number (xxxx) and Date of Birth (mm/dd/yyyy) as indicated and click Submit. You will be taken to the next sign-up page. 5. Create a [employer] ID.  This will be your [employer] login ID and cannot be changed, so think of one that is secure and easy to remember. 6. Create a [employer]Bio password. You can change your password at any time. 7. Enter your Password Reset Question and Answer. This can be used at a later time if you forget your password. 8. Enter your e-mail address. You will receive e-mail notification when new information is available in [address] 9. Click Sign Up. You can now view and download portions of your medical record. 10. Click the Download Summary menu link to download a portable copy of your medical information. Additional Information    If you have questions, please visit the Frequently Asked Questions section of the [employer] website at [email or web address] Remember, [employer] is NOT to be used for urgent needs. For medical emergencies, dial 911. Follow-up Disposition:  Return in about 3 months (around 8/24/2018), or if symptoms worsen or fail to improve. I have reviewed with the patient details of the assessment and plan and all questions were answered. Relevent patient education was performed. The most recent lab findings were reviewed with the patient. An After Visit Summary was printed and given to the patient.

## 2018-05-24 NOTE — MR AVS SNAPSHOT
[address] [place] [address] 
[phone] Patient: Gerard Matos MRN: [id number] :1965 Visit Information Date & Time Provider Department Dept. Phone Encounter #  
 2018 12:15 PM Perico Mccartney MD 1804 [facility] [phone] [id number] Follow-up Instructions Return in about 3 months (around 2018), or if symptoms worsen or fail to improve. Upcoming Health Maintenance Date Due Influenza Age 5 to Adult 2018 FOBT Q 1 YEAR AGE 50-75 2018 DTaP/Tdap/Td series (2 - Td) 2027 Allergies as of 2018  Review Complete On: 2018 By: Traci Coley LPN No Known Allergies Current Immunizations  Reviewed on 2011 No immunizations on file. Not reviewed this visit You Were Diagnosed With   
  
 Codes Comments Essential hypertension    -  Primary ICD-10-CM: I10 
ICD-9-CM: 401.9 Hyperlipidemia, unspecified hyperlipidemia type     ICD-10-CM: E78.5 ICD-9-CM: 272.4 Vitamin D deficiency     ICD-10-CM: E55.9 ICD-9-CM: 268.9 Screening for colon cancer     ICD-10-CM: Z12.11 ICD-9-CM: V76.51 Vitals BP Pulse Temp Resp Height(growth percentile) Weight(growth percentile) 120/76 81 98.3 °F (36.8 °C) (Oral) 16 5' 8\" (1.727 m) 164 lb (74.4 kg) SpO2 BMI Smoking Status 98% 24.94 kg/m2 Current Some Day Smoker BMI and BSA Data Body Mass Index Body Surface Area 24.94 kg/m 2 1.89 m 2 Preferred Pharmacy Pharmacy Name Phone [facility] [address]  [place] [phone] Your Updated Medication List  
  
   
This list is accurate as of 18 12:46 PM.  Always use your most recent med list.  
  
  
  
  
 * albuterol 2.5 mg /3 mL (0.083 %) nebulizer solution Commonly known as:  PROVENTIL VENTOLIN  
 USE 3 ML VIA NEBULIZER EVERY 4 HOURS AS NEEDED FOR WHEEZING  
  
 * albuterol 90 mcg/actuation inhaler Commonly known as:  VENTOLIN HFA INHALE 2 PUFFS BY MOUTH EVERY 4 HOURS AS NEEDED FOR WHEEZING  
  
 amLODIPine 5 mg tablet Commonly known as:  Alessandro Dian TAKE 1 TABLET BY MOUTH EVERY DAY  
  
 * ergocalciferol 50,000 unit capsule Commonly known as:  ERGOCALCIFEROL  
TAKE 1 CAPSULE BY MOUTH EVERY WEEK * ergocalciferol 50,000 unit capsule Commonly known as:  VITAMIN D2  
TAKE 1 CAPSULE BY MOUTH EVERY WEEK  
  
 ibuprofen 600 mg tablet Commonly known as:  MOTRIN Take 1 Tab by mouth every eight (8) hours as needed for Pain. * Notice: This list has 4 medication(s) that are the same as other medications prescribed for you. Read the directions carefully, and ask your doctor or other care provider to review them with you. Prescriptions Sent to Pharmacy Refills  
 amLODIPine (NORVASC) 5 mg tablet 1 Sig: TAKE 1 TABLET BY MOUTH EVERY DAY Class: Normal  
 [facility] [address] [facility] [address] [place] [address] Ph #: [phone]  
 ergocalciferol (VITAMIN D2) 50,000 unit capsule 3 Sig: TAKE 1 CAPSULE BY MOUTH EVERY WEEK Class: Normal  
 Pharmacy: [facility] [address] Ph #: [phone] We Performed the Following AMB POC LIPID PROFILE [43374 CPT(R)] REFERRAL TO GASTROENTEROLOGY [XKI29 Custom] Follow-up Instructions Return in about 3 months (around 8/24/2018), or if symptoms worsen or fail to improve. Referral Information Referral ID Referred By Referred To  
  
 [id number] [facility] 101 [facility] ([facility])   
   305 [facility] 230 [place], 200 [facility] Visits Status Start Date End Date 1 New Request 5/24/18 5/24/19 If your referral has a status of pending review or denied, additional information will be sent to support the outcome of this decision. Patient Instructions MyChart Activation Thank you for requesting access to [facility]. Please follow the instructions below to securely access and download your online medical record. [facility] allows you to send messages to your doctor, view your test results, renew your prescriptions, schedule appointments, and more. How Do I Sign Up? 1. In your internet browser, go to [email or web address] 
2. Click on the First Time User? Click Here link in the Sign In box. You will be redirect to the New Member Sign Up page. 3. Enter your [facility] Access Code exactly as it appears below. You will not need to use this code after youve completed the sign-up process. If you do not sign up before the expiration date, you must request a new code. [facility] Access Code: [email or web address] Expires: 2018  5:24 AM (This is the date your [facility] access code will ) 4. Enter the last four digits of your Social Security Number (xxxx) and Date of Birth (mm/dd/yyyy) as indicated and click Submit. You will be taken to the next sign-up page. 5. Create a [facility] ID. This will be your [facility] login ID and cannot be changed, so think of one that is secure and easy to remember. 6. Create a [facility] password. You can change your password at any time. 7. Enter your Password Reset Question and Answer. This can be used at a later time if you forget your password. 8. Enter your e-mail address. You will receive e-mail notification when new information is available in [address] 9. Click Sign Up. You can now view and download portions of your medical record. 10. Click the Download Summary menu link to download a portable copy of your medical information. Additional Information If you have questions, please visit the Frequently Asked Questions section of the Riskified website at [email or web address] Remember, MyChart is NOT to be used for urgent needs. For medical emergencies, dial 911. Introducing [facility] SERVICES! Flakita Joyner introduces Riskified patient portal. Now you can access parts of your medical record, email your doctor's office, and request medication refills online. 1. In your internet browser, go to [email or web address] 2. Click on the First Time User? Click Here link in the Sign In box. You will see the New Member Sign Up page. 3. Enter your Riskified Access Code exactly as it appears below. You will not need to use this code after youve completed the sign-up process. If you do not sign up before the expiration date, you must request a new code. · Riskified Access Code: [email or web address] Expires: 8/12/2018  5:24 AM 
 
4. Enter the last four digits of your Social Security Number (xxxx) and Date of Birth (mm/dd/yyyy) as indicated and click Submit. You will be taken to the next sign-up page. 5. Create a Riskified ID. This will be your Riskified login ID and cannot be changed, so think of one that is secure and easy to remember. 6. Create a Riskified password. You can change your password at any time. 7. Enter your Password Reset Question and Answer. This can be used at a later time if you forget your password. 8. Enter your e-mail address. You will receive e-mail notification when new information is available in [address] 9. Click Sign Up. You can now view and download portions of your medical record. 10. Click the Download Summary menu link to download a portable copy of your medical information. If you have questions, please visit the Frequently Asked Questions section of the Riskified website. Remember, Riskified is NOT to be used for urgent needs. For medical emergencies, dial 911. Now available from your iPhone and Android! Please provide this summary of care documentation to your next provider. Your primary care clinician is listed as Morgan Pichardo. If you have any questions after today's visit, please call [phone].

## 2018-05-24 NOTE — PATIENT INSTRUCTIONS
DCMobility Activation    Thank you for requesting access to [facility]. Please follow the instructions below to securely access and download your online medical record. [facility] allows you to send messages to your doctor, view your test results, renew your prescriptions, schedule appointments, and more. How Do I Sign Up? 1. In your internet browser, go to [email or web address]  2. Click on the First Time User? Click Here link in the Sign In box. You will be redirect to the New Member Sign Up page. 3. Enter your D[facility]ility Access Code exactly as it appears below. You will not need to use this code after youve completed the sign-up process. If you do not sign up before the expiration date, you must request a new code. [facility]ility Access Code: [email or web address]  Expires: 2018  5:24 AM (This is the date your DCM[facility]ility access code will )    4. Enter the last four digits of your Social Security Number (xxxx) and Date of Birth (mm/dd/yyyy) as indicated and click Submit. You will be taken to the next sign-up page. 5. Create a DCMobility ID. This will be your DCMobility login ID and cannot be changed, so think of one that is secure and easy to remember. 6. Create a DCMobility password. You can change your password at any time. 7. Enter your Password Reset Question and Answer. This can be used at a later time if you forget your password. 8. Enter your e-mail address. You will receive e-mail notification when new information is available in [address] 9. Click Sign Up. You can now view and download portions of your medical record. 10. Click the Download Summary menu link to download a portable copy of your medical information. Additional Information    If you have questions, please visit the Frequently Asked Questions section of the DCMobility website at [email or web address] Remember, DCMobility is NOT to be used for urgent needs. For medical emergencies, dial 911.

## 2018-10-15 DIAGNOSIS — I10 ESSENTIAL HYPERTENSION: ICD-10-CM

## 2018-10-15 DIAGNOSIS — E78.5 HYPERLIPIDEMIA, UNSPECIFIED HYPERLIPIDEMIA TYPE: ICD-10-CM

## 2018-10-16 RX ORDER — AMLODIPINE BESYLATE 5 MG/1
TABLET ORAL
Qty: 90 TAB | Refills: 0 | Status: SHIPPED | OUTPATIENT
Start: 2018-10-16 | End: 2018-10-18 | Stop reason: SDUPTHER

## 2018-10-16 RX ORDER — ERGOCALCIFEROL 1.25 MG/1
CAPSULE ORAL
Qty: 12 CAP | Refills: 0 | Status: SHIPPED | OUTPATIENT
Start: 2018-10-16 | End: 2018-10-18 | Stop reason: SDUPTHER

## 2018-10-18 ENCOUNTER — OFFICE VISIT (OUTPATIENT)
Dept: INTERNAL MEDICINE CLINIC | Age: 53
End: 2018-10-18

## 2018-10-18 VITALS
SYSTOLIC BLOOD PRESSURE: 120 MMHG | TEMPERATURE: 97.9 F | WEIGHT: 168 LBS | OXYGEN SATURATION: 99 % | HEIGHT: 68 IN | HEART RATE: 65 BPM | DIASTOLIC BLOOD PRESSURE: 70 MMHG | RESPIRATION RATE: 19 BRPM | BODY MASS INDEX: 25.46 KG/M2

## 2018-10-18 DIAGNOSIS — M50.90 CERVICAL DISC DISEASE: Primary | ICD-10-CM

## 2018-10-18 DIAGNOSIS — S23.9XXA THORACIC SPRAIN: ICD-10-CM

## 2018-10-18 DIAGNOSIS — Z12.11 SCREENING FOR COLON CANCER: ICD-10-CM

## 2018-10-18 NOTE — PATIENT INSTRUCTIONS
psicofxp Activation Thank you for requesting access to [facility]icofxp. Please follow the instructions below to securely access and download your online medical record. psicofxp allows you to send messages to your doctor, view your test results, renew your prescriptions, schedule appointments, and more. How Do I Sign Up? 1. In your internet browser, go to [email or web address] 
2. Click on the First Time User? Click Here link in the Sign In box. You will be redirect to the New Member Sign Up page. 3. Enter your psicofxp Access Code exactly as it appears below. You will not need to use this code after youve completed the sign-up process. If you do not sign up before the expiration date, you must request a new code. psicofxp Access Code: [email or web address] Expires: 2019  3:00 PM (This is the date your psicofxp access code will ) 4. Enter the last four digits of your Social Security Number (xxxx) and Date of Birth (mm/dd/yyyy) as indicated and click Submit. You will be taken to the next sign-up page. 5. Create a psicofxp ID. This will be your psicofxp login ID and cannot be changed, so think of one that is secure and easy to remember. 6. Create a psicofxp password. You can change your password at any time. 7. Enter your Password Reset Question and Answer. This can be used at a later time if you forget your password. 8. Enter your e-mail address. You will receive e-mail notification when new information is available in [address] 9. Click Sign Up. You can now view and download portions of your medical record. 10. Click the Download Summary menu link to download a portable copy of your medical information. Additional Information If you have questions, please visit the Frequently Asked Questions section of the psicofxp website at [email or web address] Remember, psicofxp is NOT to be used for urgent needs. For medical emergencies, dial 911.

## 2018-10-18 NOTE — PROGRESS NOTES
1. Have you been to the ER, urgent care clinic since your last visit? Hospitalized since your last visit? Yes , [facility] 
 
 
2. Have you seen or consulted any other health care providers outside of the [address] [place] [address] since your last visit? Include any pap smears or colon screening. Yes PHQ over the last two weeks 10/18/2018 Little interest or pleasure in doing things Not at all Feeling down, depressed, irritable, or hopeless Not at all Total Score PHQ 2 0
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
## 2018-11-19 ENCOUNTER — HOSPITAL ENCOUNTER (OUTPATIENT)
Dept: GENERAL RADIOLOGY | Age: 53
Discharge: HOME | End: 2018-11-19
Payer: MEDICAID

## 2018-11-19 DIAGNOSIS — S23.9XXA THORACIC SPRAIN: ICD-10-CM

## 2018-11-19 DIAGNOSIS — M50.90 CERVICAL DISC DISEASE: ICD-10-CM

## 2018-11-19 PROCEDURE — 72072 X-RAY EXAM THORAC SPINE 3VWS: CPT

## 2018-11-19 PROCEDURE — 72050 X-RAY EXAM NECK SPINE 4/5VWS: CPT

## 2018-11-28 ENCOUNTER — OFFICE VISIT (OUTPATIENT)
Dept: INTERNAL MEDICINE CLINIC | Age: 53
End: 2018-11-28

## 2018-11-28 VITALS
HEIGHT: 68 IN | DIASTOLIC BLOOD PRESSURE: 8 MMHG | HEART RATE: 71 BPM | TEMPERATURE: 98.6 F | RESPIRATION RATE: 16 BRPM | SYSTOLIC BLOOD PRESSURE: 122 MMHG | WEIGHT: 169 LBS | OXYGEN SATURATION: 99 % | BODY MASS INDEX: 25.61 KG/M2

## 2018-11-28 DIAGNOSIS — M12.812 ROTATOR CUFF ARTHROPATHY OF LEFT SHOULDER: Primary | ICD-10-CM

## 2018-11-28 DIAGNOSIS — I10 ESSENTIAL HYPERTENSION: ICD-10-CM

## 2018-11-28 DIAGNOSIS — M50.90 CERVICAL DISC DISEASE: ICD-10-CM

## 2018-11-28 DIAGNOSIS — L30.9 ECZEMA, UNSPECIFIED TYPE: ICD-10-CM

## 2018-11-28 DIAGNOSIS — S23.9XXA THORACIC SPRAIN: ICD-10-CM

## 2018-11-28 RX ORDER — ERGOCALCIFEROL 1.25 MG/1
CAPSULE ORAL
Qty: 4 CAP | Refills: 12 | Status: SHIPPED | OUTPATIENT
Start: 2018-11-28 | End: 2019-03-04

## 2018-11-28 RX ORDER — TRIAMCINOLONE ACETONIDE 1 MG/G
CREAM TOPICAL 2 TIMES DAILY
Qty: 60 G | Refills: 12 | Status: SHIPPED | OUTPATIENT
Start: 2018-11-28 | End: 2019-04-08 | Stop reason: ALTCHOICE

## 2018-11-28 NOTE — PATIENT INSTRUCTIONS
AccountNow Activation    Thank you for requesting access to AccountNow. Please follow the instructions below to securely access and download your online medical record. AccountNow allows you to send messages to your doctor, view your test results, renew your prescriptions, schedule appointments, and more. How Do I Sign Up? 1. In your internet browser, go to [email or web address]  2. Click on the First Time User? Click Here link in the Sign In box. You will be redirect to the New Member Sign Up page. 3. Enter your AccountNow Access Code exactly as it appears below. You will not need to use this code after youve completed the sign-up process. If you do not sign up before the expiration date, you must request a new code. AccountNow Access Code: [email or web address]  Expires: 2019  2:00 PM (This is the date your AccountNow access code will )    4. Enter the last four digits of your Social Security Number (xxxx) and Date of Birth (mm/dd/yyyy) as indicated and click Submit. You will be taken to the next sign-up page. 5. Create a AccountNow ID. This will be your AccountNow login ID and cannot be changed, so think of one that is secure and easy to remember. 6. Create a AccountNow password. You can change your password at any time. 7. Enter your Password Reset Question and Answer. This can be used at a later time if you forget your password. 8. Enter your e-mail address. You will receive e-mail notification when new information is available in [address] 9. Click Sign Up. You can now view and download portions of your medical record. 10. Click the Download Summary menu link to download a portable copy of your medical information. Additional Information    If you have questions, please visit the Frequently Asked Questions section of the AccountNow website at [email or web address] Remember, AccountNow is NOT to be used for urgent needs. For medical emergencies, dial 911.
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
## 2018-11-28 NOTE — PROGRESS NOTES
1. Have you been to the ER, urgent care clinic since your last visit? Hospitalized since your last visit?no    2. Have you seen or consulted any other health care providers outside of the [address] [place] [address] since your last visit? Include any pap smears or colon screening.  No    PHQ over the last two weeks 10/18/2018   Little interest or pleasure in doing things Not at all   Feeling down, depressed, irritable, or hopeless Not at all   Total Score PHQ 2 0     Chief Complaint   Patient presents with    Back Pain    Neck Pain

## 2018-12-28 ENCOUNTER — HOSPITAL ENCOUNTER (EMERGENCY)
Age: 53
Discharge: HOME | End: 2018-12-29
Attending: EMERGENCY MEDICINE | Admitting: EMERGENCY MEDICINE
Payer: MEDICAID

## 2018-12-28 DIAGNOSIS — I10 ESSENTIAL HYPERTENSION: Primary | ICD-10-CM

## 2018-12-28 PROCEDURE — 99284 EMERGENCY DEPT VISIT MOD MDM: CPT

## 2018-12-29 VITALS
OXYGEN SATURATION: 96 % | WEIGHT: 169.75 LBS | HEIGHT: 69 IN | HEART RATE: 74 BPM | TEMPERATURE: 97.8 F | BODY MASS INDEX: 25.14 KG/M2 | RESPIRATION RATE: 17 BRPM | SYSTOLIC BLOOD PRESSURE: 128 MMHG | DIASTOLIC BLOOD PRESSURE: 86 MMHG

## 2018-12-29 RX ORDER — AMLODIPINE BESYLATE 2.5 MG/1
2.5 TABLET ORAL DAILY
Qty: 30 TAB | Refills: 0 | Status: SHIPPED | OUTPATIENT
Start: 2018-12-29 | End: 2019-04-08 | Stop reason: ALTCHOICE

## 2018-12-29 NOTE — ED NOTES
Assumed care of pt. from [facility]. Pt. Presents to the ED with chief complaint of HTN. Pt reports that he is supposed to be taking amlodipine for his blood pressure but stopped about a month ago because \"I don't like taking pills. \" Pt reports having a \"a lot on his plate recently and feeling stressed. \" Pt. Is A/O x 4. Pt. Denies any other symptoms at this time. Pt. Resting comfortably on the stretcher in a position of comfort. Pt. In no acute distress at this time. Call bell within reach. Side rails x 2. Cardiac monitor x 2. Stretcher locked in the lowest position. Pt. Aware of plan to await for MD/PA-C/NP assessment, and patient/family verbalizes understanding. Will continue to monitor.

## 2018-12-29 NOTE — ED NOTES
Dr. Irlanda Ayoub gave and reviewed discharge instructions with the patient. The patient verbalized understanding. The patient was given opportunity for questions. Patient discharged in stable condition to the waiting room via ambulatory with steady gait.

## 2018-12-29 NOTE — DISCHARGE INSTRUCTIONS
High Blood Pressure: Care Instructions  Your Care Instructions    If your blood pressure is usually above 130/80, you have high blood pressure, or hypertension. That means the top number is 130 or higher or the bottom number is 80 or higher, or both. Despite what a lot of people think, high blood pressure usually doesn't cause headaches or make you feel dizzy or lightheaded. It usually has no symptoms. But it does increase your risk for heart attack, stroke, and kidney or eye damage. The higher your blood pressure, the more your risk increases. Your doctor will give you a goal for your blood pressure. Your goal will be based on your health and your age. Lifestyle changes, such as eating healthy and being active, are always important to help lower blood pressure. You might also take medicine to reach your blood pressure goal.  Follow-up care is a key part of your treatment and safety. Be sure to make and go to all appointments, and call your doctor if you are having problems. It's also a good idea to know your test results and keep a list of the medicines you take. How can you care for yourself at home? Medical treatment  · If you stop taking your medicine, your blood pressure will go back up. You may take one or more types of medicine to lower your blood pressure. Be safe with medicines. Take your medicine exactly as prescribed. Call your doctor if you think you are having a problem with your medicine. · Talk to your doctor before you start taking aspirin every day. Aspirin can help certain people lower their risk of a heart attack or stroke. But taking aspirin isn't right for everyone, because it can cause serious bleeding. · See your doctor regularly. You may need to see the doctor more often at first or until your blood pressure comes down. · If you are taking blood pressure medicine, talk to your doctor before you take decongestants or anti-inflammatory medicine, such as ibuprofen.  Some of these medicines can raise blood pressure. · Learn how to check your blood pressure at home. Lifestyle changes  · Stay at a healthy weight. This is especially important if you put on weight around the waist. Losing even 10 pounds can help you lower your blood pressure. · If your doctor recommends it, get more exercise. Walking is a good choice. Bit by bit, increase the amount you walk every day. Try for at least 30 minutes on most days of the week. You also may want to swim, bike, or do other activities. · Avoid or limit alcohol. Talk to your doctor about whether you can drink any alcohol. · Try to limit how much sodium you eat to less than 2,300 milligrams (mg) a day. Your doctor may ask you to try to eat less than 1,500 mg a day. · Eat plenty of fruits (such as bananas and oranges), vegetables, legumes, whole grains, and low-fat dairy products. · Lower the amount of saturated fat in your diet. Saturated fat is found in animal products such as milk, cheese, and meat. Limiting these foods may help you lose weight and also lower your risk for heart disease. · Do not smoke. Smoking increases your risk for heart attack and stroke. If you need help quitting, talk to your doctor about stop-smoking programs and medicines. These can increase your chances of quitting for good. When should you call for help? Call 911 anytime you think you may need emergency care. This may mean having symptoms that suggest that your blood pressure is causing a serious heart or blood vessel problem. Your blood pressure may be over 180/120.   For example, call 911 if:    · You have symptoms of a heart attack. These may include:  ? Chest pain or pressure, or a strange feeling in the chest.  ? Sweating. ? Shortness of breath. ? Nausea or vomiting. ? Pain, pressure, or a strange feeling in the back, neck, jaw, or upper belly or in one or both shoulders or arms. ? Lightheadedness or sudden weakness.   ? A fast or irregular heartbeat.     · You have symptoms of a stroke. These may include:  ? Sudden numbness, tingling, weakness, or loss of movement in your face, arm, or leg, especially on only one side of your body. ? Sudden vision changes. ? Sudden trouble speaking. ? Sudden confusion or trouble understanding simple statements. ? Sudden problems with walking or balance. ? A sudden, severe headache that is different from past headaches.     · You have severe back or belly pain.    Do not wait until your blood pressure comes down on its own. Get help right away.   Call your doctor now or seek immediate care if:    · Your blood pressure is much higher than normal (such as 180/120 or higher), but you don't have symptoms.     · You think high blood pressure is causing symptoms, such as:  ? Severe headache.  ? Blurry vision.    Watch closely for changes in your health, and be sure to contact your doctor if:    · Your blood pressure measures higher than your doctor recommends at least 2 times. That means the top number is higher or the bottom number is higher, or both.     · You think you may be having side effects from your blood pressure medicine. Where can you learn more? Go to http://oskar-delma.info/. Enter L268 in the search box to learn more about \"High Blood Pressure: Care Instructions. \"  Current as of: December 6, 2017  Content Version: 11.8  © 6745-7680 [employer]. Care instructions adapted under license by [employer] (which disclaims liability or warranty for this information). If you have questions about a medical condition or this instruction, always ask your healthcare professional. [place] [address] any warranty or liability for your use of this information.

## 2018-12-29 NOTE — ED PROVIDER NOTES
EMERGENCY DEPARTMENT HISTORY AND PHYSICAL EXAM 
 
 
Date: 12/28/2018 Patient Name: Brigette Zaman History of Presenting Illness Chief Complaint Patient presents with  Hypertension \"i stopped taking amlodipine about a month ago cause i dont want to take medicine anymore and now my bp is ki rocketed. \" reports recent stressors as well. History Provided By: Patient HPI: Brigette Zaman, 48 y.o. male with PMHx significant for HTN, HLD, GERD presents ambulatory to the ED with cc of dizziness and lightheadedness today. Patient reports last month he stopped taking his 5 mg amlodipine last month because \"I don't like taking pills\". He reports he took his blood pressure at home which gave a reading of 166/120. He denies nasal congestion, CP, SOB, headache, numbness, weakness, sore throat, or cocaine use. There are no other complaints, changes, or physical findings at this time. PCP: Kerry Cisneros MD 
 
No current facility-administered medications on file prior to encounter. Current Outpatient Medications on File Prior to Encounter Medication Sig Dispense Refill  ergocalciferol (ERGOCALCIFEROL) 50,000 unit capsule TAKE 1 CAPSULE BY MOUTH EVERY WEEK 4 Cap 12  
 triamcinolone acetonide (KENALOG) 0.1 % topical cream Apply  to affected area two (2) times a day. 60 g 12  
 albuterol (VENTOLIN HFA) 90 mcg/actuation inhaler INHALE 2 PUFFS BY MOUTH EVERY 4 HOURS AS NEEDED FOR WHEEZING 1 Inhaler 11  
 ibuprofen (MOTRIN) 600 mg tablet Take 1 Tab by mouth every eight (8) hours as needed for Pain. 30 Tab 0  
 albuterol (PROVENTIL VENTOLIN) 2.5 mg /3 mL (0.083 %) nebulizer solution USE 3 ML VIA NEBULIZER EVERY 4 HOURS AS NEEDED FOR WHEEZING 100 Each 11 Past History Past Medical History: 
Past Medical History:  
Diagnosis Date  Anxiety   
 anxiety  Chronic pain   
 back  Gastrointestinal disorder  GERD (gastroesophageal reflux disease)  H/O Bell's palsy   
 left side of face tingles, intermittently, not frequently  Hyperlipidemia  Hypertension  Meningitis 2012  
 spinal meningitis  ; resolved as of 4/3/14  Positive PPD CXR have been negative per pt 4/4/14  Tennis elbow 4/3/14  
 left Past Surgical History: 
Past Surgical History:  
Procedure Laterality Date  HX ORTHOPAEDIC    
 right knee ligament repair  HX ORTHOPAEDIC    
 right  hand  HX ORTHOPAEDIC    
 right rotator cuff Family History: 
Family History Problem Relation Age of Onset  Hypertension Mother  Diabetes Father  Hypertension Father  Cancer Brother   
     prostate  Heart Disease Neg Hx   
 Heart Attack Neg Hx  High Cholesterol Neg Hx  Stroke Neg Hx Social History: 
Social History Tobacco Use  Smoking status: Former Smoker Packs/day: 0.25  Smokeless tobacco: Never Used Substance Use Topics  Alcohol use: Yes Alcohol/week: 0.0 oz  
  Comment: occassional  
 Drug use: No  
 
 
Allergies: 
No Known Allergies Review of Systems Review of Systems Constitutional: Negative for chills and fever. HENT: Negative for congestion and sore throat. Eyes: Negative for visual disturbance. Respiratory: Negative for cough and shortness of breath. Cardiovascular: Negative for chest pain and leg swelling. Gastrointestinal: Negative for abdominal pain, blood in stool, diarrhea and nausea. Endocrine: Negative for polyuria. Genitourinary: Negative for dysuria and testicular pain. Musculoskeletal: Negative for arthralgias, joint swelling and myalgias. Skin: Negative for rash. Allergic/Immunologic: Negative for immunocompromised state. Neurological: Positive for dizziness and light-headedness. Negative for weakness and headaches. Hematological: Does not bruise/bleed easily. Psychiatric/Behavioral: Negative for confusion.   
 
 
Physical Exam  
Physical Exam  
 Constitutional: He is oriented to person, place, and time. He appears well-developed and well-nourished. HENT:  
Head: Normocephalic and atraumatic. Moist mucous membranes Eyes: Conjunctivae are normal. Pupils are equal, round, and reactive to light. Right eye exhibits no discharge. Left eye exhibits no discharge. Neck: Normal range of motion. Neck supple. No tracheal deviation present. Cardiovascular: Normal rate, regular rhythm and normal heart sounds. No murmur heard. Pulmonary/Chest: Effort normal and breath sounds normal. No respiratory distress. He has no wheezes. He has no rales. Abdominal: Soft. Bowel sounds are normal. There is no tenderness. There is no rebound and no guarding. Musculoskeletal: Normal range of motion. He exhibits no edema, tenderness or deformity. Neurological: He is alert and oriented to person, place, and time. Skin: Skin is warm and dry. No rash noted. No erythema. Psychiatric: His behavior is normal.  
Nursing note and vitals reviewed. Diagnostic Study Results Labs - No results found for this or any previous visit (from the past 12 hour(s)). Radiologic Studies - No orders to display CT Results  (Last 48 hours) None CXR Results  (Last 48 hours) None Medical Decision Making I am the first provider for this patient. I reviewed the vital signs, available nursing notes, past medical history, past surgical history, family history and social history. Vital Signs-Reviewed the patient's vital signs. Patient Vitals for the past 12 hrs: 
 Temp Pulse Resp BP SpO2  
12/29/18 0030    128/86 96 % 12/29/18 0027    128/86 98 % 12/28/18 2212 97.8 °F (36.6 °C) 74 17 146/86 100 % Pulse Oximetry Analysis - 96% on RA Cardiac Monitor:  
Rate: 74 bpm 
Rhythm: Normal Sinus Rhythm Records Reviewed: Nursing Notes and Old Medical Records Provider Notes (Medical Decision Making):  
 Consistent with asymptomatic HTN, no further imaging or labs needed at this time ED Course:  
Initial assessment performed. The patients presenting problems have been discussed, and they are in agreement with the care plan formulated and outlined with them. I have encouraged them to ask questions as they arise throughout their visit. Critical Care Time:  
0 minutes Disposition: 
DISCHARGE NOTE: 
12:37 AM 
The patient is ready for discharge. The patients signs, symptoms, diagnosis, and instructions for discharge have been discussed and the pt has conveyed their understanding. The patient is to follow up as recommended or return to the ER should their symptoms worsen. Plan has been discussed and patient has conveyed their agreement. PLAN: 
1. Discharge Medication List as of 12/29/2018 12:39 AM  
  
 
2. Follow-up Information Follow up With Specialties Details Why Contact Info Thomas Pepper MD Internal Medicine Schedule an appointment as soon as possible for a visit  [place] [address] 
[phone] [facility] EMERGENCY DEPT Emergency Medicine  If symptoms worsen [address] [place] [address] Drive [address] [facility] 
[phone] Return to ED if worse Diagnosis Clinical Impression: 1. Essential hypertension Attestations: This note is prepared by Meghann Skelton, acting as Scribe for DO Brenda Cedeño DO: The scribe's documentation has been prepared under my direction and personally reviewed by me in its entirety. I confirm that the note above accurately reflects all work, treatment, procedures, and medical decision making performed by me.

## 2019-01-08 ENCOUNTER — HOSPITAL ENCOUNTER (EMERGENCY)
Age: 54
Discharge: HOME | End: 2019-01-08
Attending: EMERGENCY MEDICINE
Payer: MEDICAID

## 2019-01-08 VITALS
TEMPERATURE: 97.9 F | HEIGHT: 68 IN | WEIGHT: 169.31 LBS | BODY MASS INDEX: 25.66 KG/M2 | SYSTOLIC BLOOD PRESSURE: 144 MMHG | HEART RATE: 70 BPM | DIASTOLIC BLOOD PRESSURE: 87 MMHG | RESPIRATION RATE: 14 BRPM | OXYGEN SATURATION: 98 %

## 2019-01-08 DIAGNOSIS — K08.89 DENTALGIA: Primary | ICD-10-CM

## 2019-01-08 DIAGNOSIS — K04.7 DENTAL INFECTION: ICD-10-CM

## 2019-01-08 DIAGNOSIS — G50.1 ATYPICAL FACIAL PAIN: ICD-10-CM

## 2019-01-08 PROCEDURE — 74011250637 HC RX REV CODE- 250/637: Performed by: PHYSICIAN ASSISTANT

## 2019-01-08 PROCEDURE — 74011000250 HC RX REV CODE- 250: Performed by: PHYSICIAN ASSISTANT

## 2019-01-08 PROCEDURE — 99283 EMERGENCY DEPT VISIT LOW MDM: CPT

## 2019-01-08 RX ORDER — PENICILLIN V POTASSIUM 500 MG/1
500 TABLET, FILM COATED ORAL 4 TIMES DAILY
Qty: 28 TAB | Refills: 0 | Status: SHIPPED | OUTPATIENT
Start: 2019-01-08 | End: 2019-01-15

## 2019-01-08 RX ORDER — KETOROLAC TROMETHAMINE 10 MG/1
10 TABLET, FILM COATED ORAL ONCE
Status: COMPLETED | OUTPATIENT
Start: 2019-01-08 | End: 2019-01-08

## 2019-01-08 RX ORDER — LIDOCAINE HYDROCHLORIDE 20 MG/ML
JELLY TOPICAL
Qty: 1 TUBE | Refills: 0 | Status: SHIPPED | OUTPATIENT
Start: 2019-01-08 | End: 2019-04-08 | Stop reason: ALTCHOICE

## 2019-01-08 RX ORDER — KETOROLAC TROMETHAMINE 10 MG/1
10 TABLET, FILM COATED ORAL
Qty: 20 TAB | Refills: 0 | Status: SHIPPED | OUTPATIENT
Start: 2019-01-08 | End: 2019-03-04

## 2019-01-08 RX ORDER — PENICILLIN V POTASSIUM 250 MG/1
500 TABLET, FILM COATED ORAL
Status: COMPLETED | OUTPATIENT
Start: 2019-01-08 | End: 2019-01-08

## 2019-01-08 RX ADMIN — PENICILLIN V POTASIUM 500 MG: 250 TABLET ORAL at 22:59

## 2019-01-08 RX ADMIN — LIDOCAINE HYDROCHLORIDE: 20 SOLUTION ORAL; TOPICAL at 22:59

## 2019-01-08 RX ADMIN — KETOROLAC TROMETHAMINE 10 MG: 10 TABLET, FILM COATED ORAL at 22:59

## 2019-01-09 NOTE — DISCHARGE INSTRUCTIONS
Thank you!     Thank you for allowing us to provide you with excellent care today. We hope we addressed all of your concerns and needs. We strive to provide excellent quality care in the Emergency Department. You will receive a survey after your visit to evaluate the care you were provided.      Please rate us a level 5 (excellent), as anything less than excellent does not meet our goals.      If you feel that you have not received excellent quality care or timely care, please ask to speak to the nurse manager. Please choose us in the future for your continued health care needs. ______________________________________________________________________    The exam and treatment you received in the Emergency Department were for an urgent problem and are not intended as complete care. It is important that you follow-up with a doctor, nurse practitioner, or physician assistant to:  (1) confirm your diagnosis,  (2) re-evaluation of changes in your illness and treatment, and  (3) for ongoing care. If your symptoms become worse or you do not improve as expected and you are unable to reach your usual health care provider, you should return to the Emergency Department. We are available 24 hours a day. Take this sheet with you when you go to your follow-up visit. If you have any problem arranging the follow-up visit, contact [address] [place] [address] [phone])    Make an appointment with your Primary Care doctor for follow up of this visit. Return to the ER if you are unable to be seen in the time recommended on your discharge instructions. Patient Education        Tooth and Gum Pain: Care Instructions  Your Care Instructions    The most common causes of dental pain are tooth decay and gum disease. Pain can also be caused by an infection of the tooth (abscess) or the gums. Or you may have pain from a broken or cracked tooth. Other causes of pain include infection and damage to a tooth from nervous grinding of your teeth.   A wisdom tooth can be painful when it is coming in but cannot break through the gum. It can also be painful when the tooth is only partway in and extra gum tissue has formed around it. The tissue can get inflamed (pericoronitis), and sometimes it gets infected. Prompt dental care can help find the cause of your toothache and keep the tooth from dying or gum disease from getting worse. Self-care at home may reduce your pain and discomfort. Follow-up care is a key part of your treatment and safety. Be sure to make and go to all appointments, and call your dentist or doctor if you are having problems. It's also a good idea to know your test results and keep a list of the medicines you take. How can you care for yourself at home? · To reduce pain and facial swelling, put an ice or cold pack on the outside of your cheek for 10 to 20 minutes at a time. Put a thin cloth between the ice and your skin. Do not use heat. · If your doctor prescribed antibiotics, take them as directed. Do not stop taking them just because you feel better. You need to take the full course of antibiotics. · Ask your doctor if you can take an over-the-counter pain medicine, such as acetaminophen (Tylenol), ibuprofen (Advil, Motrin), or naproxen (Aleve). Be safe with medicines. Read and follow all instructions on the label. · Avoid very hot, cold, or sweet foods and drinks if they increase your pain. · Rinse your mouth with warm salt water every 2 hours to help relieve pain and swelling. Mix 1 teaspoon of salt in 8 ounces of water. · Talk to your dentist about using special toothpaste for sensitive teeth. To reduce pain on contact with heat or cold or when brushing, brush with this toothpaste regularly or rub a small amount of the paste on the sensitive area with a clean finger 2 or 3 times a day. Floss gently between your teeth.   · Do not smoke or use spit tobacco. Tobacco use can make gum problems worse, decreases your ability to fight infection in your gums, and delays healing. If you need help quitting, talk to your doctor about stop-smoking programs and medicines. These can increase your chances of quitting for good. When should you call for help? Call 911 anytime you think you may need emergency care. For example, call if:    · You have trouble breathing.    Call your dentist or doctor now or seek immediate medical care if:    · You have signs of infection, such as:  ? Increased pain, swelling, warmth, or redness. ? Red streaks leading from the area. ? Pus draining from the area. ? A fever.    Watch closely for changes in your health, and be sure to contact your doctor if:    · You do not get better as expected. Where can you learn more? Go to http://oskar-delma.info/. Enter [id number] in the search box to learn more about \"Tooth and Gum Pain: Care Instructions. \"  Current as of: March 28, 2018  Content Version: 11.8  © 2671-1161 [employer], [employer]. Care instructions adapted under license by [employer] (which disclaims liability or warranty for this information). If you have questions about a medical condition or this instruction, always ask your healthcare professional. [place] [address] any warranty or liability for your use of this information.

## 2019-01-09 NOTE — ED PROVIDER NOTES
EMERGENCY DEPARTMENT HISTORY AND PHYSICAL EXAM 
 
 
Date: 1/8/2019 Patient Name: Jaymie Schulte History of Presenting Illness Chief Complaint Patient presents with  Dental Pain  
  right lower dental pain for 3 days History Provided By: Patient HPI: Jaymie Schulte, 48 y.o. male with PMHx significant for HTN, HLD, anxiety, chronic back pain, presents ambulatory to the ED with cc of right lower dental pain for the past week. Patient states that the pain has been progressively worsening and he has had no relief with Naproxen. He has not been to see a dentist in several years. He denies fevers, chills, facial swelling, trouble swallowing, sore throat, chest pain, SOB, abdominal pain, NVD. No other complaints at this time. Chief Complaint: dental pain Duration: 1 Weeks Timing:  Acute Location: right lower tooth Quality: Aching Severity: 10 out of 10 Modifying Factors: no relief with Naproxen Associated Symptoms: denies any other associated signs or symptoms There are no other complaints, changes, or physical findings at this time. PCP: Christine Moran MD 
 
Current Outpatient Medications Medication Sig Dispense Refill  penicillin v potassium (VEETID) 500 mg tablet Take 1 Tab by mouth four (4) times daily for 7 days. 28 Tab 0  
 lidocaine (XYLOCAINE) 2 % jelly Apply 4 times daily as needed to gum line 1 Tube 0  
 ketorolac (TORADOL) 10 mg tablet Take 1 Tab by mouth every six (6) hours as needed for Pain. 20 Tab 0  
 amLODIPine (NORVASC) 2.5 mg tablet Take 1 Tab by mouth daily. 30 Tab 0  
 ergocalciferol (ERGOCALCIFEROL) 50,000 unit capsule TAKE 1 CAPSULE BY MOUTH EVERY WEEK 4 Cap 12  
 triamcinolone acetonide (KENALOG) 0.1 % topical cream Apply  to affected area two (2) times a day.  60 g 12  
 albuterol (VENTOLIN HFA) 90 mcg/actuation inhaler INHALE 2 PUFFS BY MOUTH EVERY 4 HOURS AS NEEDED FOR WHEEZING 1 Inhaler 11  
  ibuprofen (MOTRIN) 600 mg tablet Take 1 Tab by mouth every eight (8) hours as needed for Pain. 30 Tab 0  
 albuterol (PROVENTIL VENTOLIN) 2.5 mg /3 mL (0.083 %) nebulizer solution USE 3 ML VIA NEBULIZER EVERY 4 HOURS AS NEEDED FOR WHEEZING 100 Each 11 Past History Past Medical History: 
Past Medical History:  
Diagnosis Date  Anxiety   
 anxiety  Chronic pain   
 back  Gastrointestinal disorder  GERD (gastroesophageal reflux disease)  H/O Bell's palsy   
 left side of face tingles, intermittently, not frequently  Hyperlipidemia  Hypertension  Meningitis 2012  
 spinal meningitis  ; resolved as of 4/3/14  Positive PPD CXR have been negative per pt 4/4/14  Tennis elbow 4/3/14  
 left Past Surgical History: 
Past Surgical History:  
Procedure Laterality Date  HX ORTHOPAEDIC    
 right knee ligament repair  HX ORTHOPAEDIC    
 right  hand  HX ORTHOPAEDIC    
 right rotator cuff Family History: 
Family History Problem Relation Age of Onset  Hypertension Mother  Diabetes Father  Hypertension Father  Cancer Brother   
     prostate  Heart Disease Neg Hx   
 Heart Attack Neg Hx  High Cholesterol Neg Hx  Stroke Neg Hx Social History: 
Social History Tobacco Use  Smoking status: Former Smoker Packs/day: 0.25  Smokeless tobacco: Never Used Substance Use Topics  Alcohol use: Yes Alcohol/week: 0.0 oz  
  Comment: occassional  
 Drug use: No  
 
 
Allergies: 
No Known Allergies Review of Systems Review of Systems Constitutional: Negative for chills, diaphoresis and fever. HENT: Positive for dental problem. Negative for rhinorrhea and sore throat. Eyes: Negative for pain. Respiratory: Negative for shortness of breath, wheezing and stridor. Cardiovascular: Negative for chest pain and leg swelling. Gastrointestinal: Negative for abdominal pain, diarrhea, nausea and vomiting. Genitourinary: Negative for difficulty urinating, dysuria, flank pain and hematuria. Musculoskeletal: Negative for back pain and neck stiffness. Skin: Negative for rash. Neurological: Negative for dizziness, seizures, syncope, weakness, light-headedness and headaches. Psychiatric/Behavioral: Negative for behavioral problems and confusion. Physical Exam  
Physical Exam  
Constitutional: He is oriented to person, place, and time. He appears well-developed and well-nourished. No distress. HENT:  
Head: Normocephalic and atraumatic. Right Ear: External ear normal.  
Left Ear: External ear normal.  
Nose: Nose normal.  
Mouth/Throat: Uvula is midline. No trismus in the jaw. No dental abscesses or uvula swelling. No oropharyngeal exudate, posterior oropharyngeal edema, posterior oropharyngeal erythema or tonsillar abscesses. Generally poor dental health overall with multiple dental caries. Multiple missing or decayed teeth. No facial erythema, edema, bleeding, or fluctuance. tooth # 32 with erosion and dental caries with some gingival tenderness, no surrounding erythema, edema, or fluctuance. No trismus. Eyes: Conjunctivae and EOM are normal.  
Neck: Normal range of motion. Neck supple. Cardiovascular: Normal rate, regular rhythm, normal heart sounds and intact distal pulses. No murmur heard. Pulmonary/Chest: Effort normal and breath sounds normal. He has no decreased breath sounds. He has no wheezes. Abdominal: Soft. Bowel sounds are normal. He exhibits no distension. There is no tenderness. There is no guarding. Musculoskeletal: Normal range of motion. He exhibits no edema or tenderness. Neurological: He is alert and oriented to person, place, and time. Skin: Skin is warm and dry. No rash noted. He is not diaphoretic. Psychiatric: He has a normal mood and affect. His behavior is normal. Judgment normal.  
Nursing note and vitals reviewed. Diagnostic Study Results Labs - No results found for this or any previous visit (from the past 12 hour(s)). Radiologic Studies - Medical Decision Making I am the first provider for this patient. I reviewed the vital signs, available nursing notes, past medical history, past surgical history, family history and social history. Vital Signs-Reviewed the patient's vital signs. Patient Vitals for the past 12 hrs: 
 Temp Pulse Resp BP SpO2  
01/08/19 2208 97.9 °F (36.6 °C) 70 14 144/87 98 % Records Reviewed: Nursing Notes and Old Medical Records Provider Notes (Medical Decision Making): DDX: gingivitis, abscess, poor oral hygiene, fractured tooth, dentalgia Patient presents with dental pain. No obvious abscess that needs drainage. No red flags that make PTA, RPA, ludwigs angina concerning. Will tx with dental balls, antibiotics and outpatient analgesics. Given information on dentists and importance of followup and no smoking. ED Course:  
Initial assessment performed. The patients presenting problems have been discussed, and they are in agreement with the care plan formulated and outlined with them. I have encouraged them to ask questions as they arise throughout their visit. 10:43 PM 
 reviewed. No controlled substance prescription fills since 09/2017. Disposition: 
DISCHARGE NOTE: 
11:00 PM 
The care plan has been outline with the patient and/or family, who verbally conveyed understanding and agreement. Available results have been reviewed. Patient and/or family understand the follow up plan as outlined and discharge instructions. Should their condition deterioration at any time after discharge the patient agrees to return, follow up sooner than outlined or seek medical assistance at the closest Emergency Room as soon as possible. Questions have been answered.  Discharge instructions and educational information regarding the patient's diagnosis as well a list of reasons why the patient would want to seek immediate medical attention, should their condition change, were reviewed directly with the patient/family PLAN: 
1. Discharge home 2. Medications as directed 3. Schedule f/u with Dentist 
4. Return precautions reviewed Current Discharge Medication List  
  
START taking these medications Details  
penicillin v potassium (VEETID) 500 mg tablet Take 1 Tab by mouth four (4) times daily for 7 days. Qty: 28 Tab, Refills: 0  
  
lidocaine (XYLOCAINE) 2 % jelly Apply 4 times daily as needed to gum line Qty: 1 Tube, Refills: 0  
  
ketorolac (TORADOL) 10 mg tablet Take 1 Tab by mouth every six (6) hours as needed for Pain. Qty: 20 Tab, Refills: 0  
  
  
 
 
5.  
Follow-up Information Follow up With Specialties Details Why Contact Info [facility]  Schedule an appointment as soon as possible for a visit  [address] [place], [place] [id number] [phone] [facility] EMERGENCY DEPT Emergency Medicine  As needed, If symptoms worsen [address] [place] [address] Drive [address] [facility] 
[phone] Return to ED if worse Diagnosis Clinical Impression: 1. Firman Bliss 2. Dental infection 3. Atypical facial pain This note will not be viewable in [address]

## 2019-01-22 ENCOUNTER — OFFICE VISIT (OUTPATIENT)
Dept: INTERNAL MEDICINE CLINIC | Age: 54
End: 2019-01-22

## 2019-01-22 VITALS
DIASTOLIC BLOOD PRESSURE: 70 MMHG | HEIGHT: 69 IN | OXYGEN SATURATION: 97 % | WEIGHT: 164 LBS | HEART RATE: 75 BPM | SYSTOLIC BLOOD PRESSURE: 120 MMHG | RESPIRATION RATE: 20 BRPM | BODY MASS INDEX: 24.29 KG/M2 | TEMPERATURE: 98.4 F

## 2019-01-22 DIAGNOSIS — G47.30 SLEEP APNEA, UNSPECIFIED TYPE: ICD-10-CM

## 2019-01-22 DIAGNOSIS — J20.9 ACUTE BRONCHITIS, UNSPECIFIED ORGANISM: Primary | ICD-10-CM

## 2019-01-22 RX ORDER — NEBULIZER AND COMPRESSOR
EACH MISCELLANEOUS
Qty: 1 EACH | Refills: 0 | Status: CANCELLED | OUTPATIENT
Start: 2019-01-22

## 2019-01-22 RX ORDER — ALBUTEROL SULFATE 90 UG/1
AEROSOL, METERED RESPIRATORY (INHALATION)
Qty: 1 INHALER | Refills: 11 | Status: SHIPPED | OUTPATIENT
Start: 2019-01-22 | End: 2019-04-08 | Stop reason: ALTCHOICE

## 2019-01-22 NOTE — PATIENT INSTRUCTIONS
Jildy Activation Thank you for requesting access to Jildy. Please follow the instructions below to securely access and download your online medical record. Jildy allows you to send messages to your doctor, view your test results, renew your prescriptions, schedule appointments, and more. How Do I Sign Up? 1. In your internet browser, go to [email or web address] 
2. Click on the First Time User? Click Here link in the Sign In box. You will be redirect to the New Member Sign Up page. 3. Enter your Jildy Access Code exactly as it appears below. You will not need to use this code after youve completed the sign-up process. If you do not sign up before the expiration date, you must request a new code. Jildy Access Code: [email or web address] Expires: 3/8/2019 12:19 PM (This is the date your Jildy access code will ) 4. Enter the last four digits of your Social Security Number (xxxx) and Date of Birth (mm/dd/yyyy) as indicated and click Submit. You will be taken to the next sign-up page. 5. Create a Jildy ID. This will be your Jildy login ID and cannot be changed, so think of one that is secure and easy to remember. 6. Create a Jildy password. You can change your password at any time. 7. Enter your Password Reset Question and Answer. This can be used at a later time if you forget your password. 8. Enter your e-mail address. You will receive e-mail notification when new information is available in [address] 9. Click Sign Up. You can now view and download portions of your medical record. 10. Click the Download Summary menu link to download a portable copy of your medical information. Additional Information If you have questions, please visit the Frequently Asked Questions section of the Jildy website at [email or web address] Remember, Jildy is NOT to be used for urgent needs. For medical emergencies, dial 911.

## 2019-01-22 NOTE — PROGRESS NOTES
Garcia Terrell is a 48 y.o. male and presents with Transitions Of Care and Shortness of Breath Loco Gomez Subjective: 
Sleep apnea Review: There is a history of  daytime fatigue with associated excessive waking  at night short of breath. There is also a history of periods of apnea at night as witnessed by a partner and family The patient is noton CPAP. Hypertension Review: 
The patient has essential hypertension Diet and Lifestyle: generally follows a  low sodium diet, exercises sporadically Home BP Monitoring: is not measured at home. Pertinent ROS: taking medications as instructed, no medication side effects noted, no TIA's, no chest pain on exertion, no dyspnea on exertion, no swelling of ankles. He states he has a past history of asthma Review of Systems Constitutional: negative for fevers, chills, anorexia and weight loss Eyes:   negative for visual disturbance and irritation ENT:   negative for tinnitus,sore throat,nasal congestion,ear pains. hoarseness Respiratory:  negative for cough, hemoptysis, dyspnea,wheezing CV:   negative for chest pain, palpitations, lower extremity edema GI:   negative for nausea, vomiting, diarrhea, abdominal pain,melena Endo:               negative for polyuria,polydipsia,polyphagia,heat intolerance Genitourinary: negative for frequency, dysuria and hematuria Integument:  negative for rash and pruritus Hematologic:  negative for easy bruising and gum/nose bleeding Musculoskel: negative for myalgias, arthralgias, back pain, muscle weakness, joint pain Neurological:  negative for headaches, dizziness, vertigo, memory problems and gait Behavl/Psych: negative for feelings of anxiety, depression, mood changes Past Medical History:  
Diagnosis Date  Anxiety   
 anxiety  Chronic pain   
 back  Gastrointestinal disorder  GERD (gastroesophageal reflux disease)  H/O Bell's palsy   
 left side of face tingles, intermittently, not frequently  Hyperlipidemia  Hypertension  Meningitis 2012  
 spinal meningitis  ; resolved as of 4/3/14  Positive PPD CXR have been negative per pt 4/4/14  Tennis elbow 4/3/14  
 left Past Surgical History:  
Procedure Laterality Date  HX ORTHOPAEDIC    
 right knee ligament repair  HX ORTHOPAEDIC    
 right  hand  HX ORTHOPAEDIC    
 right rotator cuff Social History Socioeconomic History  Marital status: SINGLE Spouse name: Not on file  Number of children: Not on file  Years of education: Not on file  Highest education level: Not on file Tobacco Use  Smoking status: Former Smoker Packs/day: 0.25  Smokeless tobacco: Never Used Substance and Sexual Activity  Alcohol use: Yes Alcohol/week: 0.0 oz  
  Comment: occassional  
 Drug use: No  
 Sexual activity: Yes  
  Partners: Female Social History Narrative ** Merged History Encounter ** Family History Problem Relation Age of Onset  Hypertension Mother  Diabetes Father  Hypertension Father  Cancer Brother   
     prostate  Heart Disease Neg Hx   
 Heart Attack Neg Hx  High Cholesterol Neg Hx  Stroke Neg Hx Current Outpatient Medications Medication Sig Dispense Refill  albuterol (VENTOLIN HFA) 90 mcg/actuation inhaler INHALE 2 PUFFS BY MOUTH EVERY 4 HOURS AS NEEDED FOR WHEEZING 1 Inhaler 11  
 amLODIPine (NORVASC) 2.5 mg tablet Take 1 Tab by mouth daily. 30 Tab 0  
 ergocalciferol (ERGOCALCIFEROL) 50,000 unit capsule TAKE 1 CAPSULE BY MOUTH EVERY WEEK 4 Cap 12  
 lidocaine (XYLOCAINE) 2 % jelly Apply 4 times daily as needed to gum line 1 Tube 0  
 ketorolac (TORADOL) 10 mg tablet Take 1 Tab by mouth every six (6) hours as needed for Pain. 20 Tab 0  
 triamcinolone acetonide (KENALOG) 0.1 % topical cream Apply  to affected area two (2) times a day. 60 g 12  ibuprofen (MOTRIN) 600 mg tablet Take 1 Tab by mouth every eight (8) hours as needed for Pain. 30 Tab 0  
 albuterol (PROVENTIL VENTOLIN) 2.5 mg /3 mL (0.083 %) nebulizer solution USE 3 ML VIA NEBULIZER EVERY 4 HOURS AS NEEDED FOR WHEEZING 100 Each 11 No Known Allergies Objective: 
Visit Vitals /70 (BP 1 Location: Left arm, BP Patient Position: Sitting) Pulse 75 Temp 98.4 °F (36.9 °C) (Oral) Resp 20 Ht 5' 4\" (1.626 m) Wt 164 lb (74.4 kg) SpO2 97% BMI 28.15 kg/m² Physical Exam:  
General appearance - alert, well appearing, and in no distress Mental status - alert, oriented to person, place, and time EYE-FERNANDA, EOMI, corneas normal, no foreign bodies ENT-ENT exam normal, no neck nodes or sinus tenderness Nose - normal and patent, no erythema, discharge or polyps Mouth - mucous membranes moist, pharynx normal without lesions Neck - supple, no significant adenopathy Chest - clear to auscultation, no wheezes, rales or rhonchi, symmetric air entry Heart - normal rate, regular rhythm, normal S1, S2, no murmurs, rubs, clicks or gallops Abdomen - soft, nontender, nondistended, no masses or organomegaly Lymph- no adenopathy palpable Ext-peripheral pulses normal, no pedal edema, no clubbing or cyanosis Skin-Warm and dry. no hyperpigmentation, vitiligo, or suspicious lesions Neuro -alert, oriented, normal speech, no focal findings or movement disorder noted Neck-normal C-spine, no tenderness, full ROM without pain Feet-no nail deformities or callus formation with good pulses noted Results for orders placed or performed in visit on 05/24/18 AMB POC LIPID PROFILE Result Value Ref Range Cholesterol (POC) 158 Triglycerides (POC) 106 HDL Cholesterol (POC) 35 Non-HDL Cholesterol 123 LDL Cholesterol (POC) 102 MG/DL TChol/HDL Ratio (POC) 4.5 Assessment/Plan: ICD-10-CM ICD-9-CM 1.  Acute bronchitis, unspecified organism J20.9 466.0 CBC W/O DIFF  
   METABOLIC PANEL, COMPREHENSIVE  
 2. Sleep apnea, unspecified type G47.30 780.57 OCCULT BLOOD IMMUNOASSAY,DIAGNOSTIC  
   CBC W/O DIFF  
   REFERRAL TO SLEEP STUDIES Orders Placed This Encounter  OCCULT BLOOD IMMUNOASSAY,DIAGNOSTIC  
 CBC W/O DIFF  
 METABOLIC PANEL, COMPREHENSIVE  
 REFERRAL TO SLEEP STUDIES Referral Priority:   Routine Referral Type:   Consultation Referral Reason:   Specialty Services Required Referred to Provider:   Blanche Escobar MD  
  Requested Specialty:   Sleep Medicine Number of Visits Requested:   1  
 albuterol (VENTOLIN HFA) 90 mcg/actuation inhaler Sig: INHALE 2 PUFFS BY MOUTH EVERY 4 HOURS AS NEEDED FOR WHEEZING Dispense:  1 Inhaler Refill:  11  
 
lose weight, stop smoking (advice and handout given), bring BP log to office visit,Take 81mg aspirin daily Patient Instructions [employer]Your[employer] Activation Thank you for requesting access to [facility]. Please follow the instructions below to securely access and download your online medical record. [facility] allows you to send messages to your doctor, view your test results, renew your prescriptions, schedule appointments, and more. How Do I Sign Up? 1. In your internet browser, go to [email or web address] 
2. Click on the First Time User? Click Here link in the Sign In box. You will be redirect to the New Member Sign Up page. 3. Enter your Twillion Access Code exactly as it appears below. You will not need to use this code after youve completed the sign-up process. If you do not sign up before the expiration date, you must request a new code. Twillion Access Code: [email or web address] Expires: 3/8/2019 12:19 PM (This is the date your Twillion access code will ) 4. Enter the last four digits of your Social Security Number (xxxx) and Date of Birth (mm/dd/yyyy) as indicated and click Submit. You will be taken to the next sign-up page. 5. Create a Twillion ID.  This will be your [facility]illion login ID and cannot be changed, so think of one that is secure and easy to remember. 6. Create a Sales Layer password. You can change your password at any time. 7. Enter your Password Reset Question and Answer. This can be used at a later time if you forget your password. 8. Enter your e-mail address. You will receive e-mail notification when new information is available in [address] 9. Click Sign Up. You can now view and download portions of your medical record. 10. Click the Download Summary menu link to download a portable copy of your medical information. Additional Information If you have questions, please visit the Frequently Asked Questions section of the [employer] website at [email or web address] Remember, [facility] Layer is NOT to be used for urgent needs. For medical emergencies, dial 911. Follow-up Disposition: 
Return in about 4 weeks (around 2/19/2019), or if symptoms worsen or fail to improve. I have reviewed with the patient details of the assessment and plan and all questions were answered. Relevent patient education was performed. The most recent lab findings were reviewed with the patient. An After Visit Summary was printed and given to the patient.

## 2019-01-22 NOTE — PROGRESS NOTES
1. Have you been to the ER, urgent care clinic since your last visit? Hospitalized since your last visit? [facility] 2. Have you seen or consulted any other health care providers outside of the [address] [place] [address] since your last visit? Include any pap smears or colon screening.  no

## 2019-01-23 LAB
ALBUMIN SERPL-MCNC: 5 G/DL (ref 3.5–5.5)
ALBUMIN/GLOB SERPL: 1.9 {RATIO} (ref 1.2–2.2)
ALP SERPL-CCNC: 79 IU/L (ref 39–117)
ALT SERPL-CCNC: 15 IU/L (ref 0–44)
AST SERPL-CCNC: 16 IU/L (ref 0–40)
BILIRUB SERPL-MCNC: 0.9 MG/DL (ref 0–1.2)
BUN SERPL-MCNC: 17 MG/DL (ref 6–24)
BUN/CREAT SERPL: 20 (ref 9–20)
CALCIUM SERPL-MCNC: 9.6 MG/DL (ref 8.7–10.2)
CHLORIDE SERPL-SCNC: 102 MMOL/L (ref 96–106)
CO2 SERPL-SCNC: 28 MMOL/L (ref 20–29)
CREAT SERPL-MCNC: 0.86 MG/DL (ref 0.76–1.27)
ERYTHROCYTE [DISTWIDTH] IN BLOOD BY AUTOMATED COUNT: 15.1 % (ref 12.3–15.4)
GLOBULIN SER CALC-MCNC: 2.6 G/DL (ref 1.5–4.5)
GLUCOSE SERPL-MCNC: 101 MG/DL (ref 65–99)
HCT VFR BLD AUTO: 43.2 % (ref 37.5–51)
HGB BLD-MCNC: 14.3 G/DL (ref 13–17.7)
MCH RBC QN AUTO: 27.8 PG (ref 26.6–33)
MCHC RBC AUTO-ENTMCNC: 33.1 G/DL (ref 31.5–35.7)
MCV RBC AUTO: 84 FL (ref 79–97)
PLATELET # BLD AUTO: 250 X10E3/UL (ref 150–379)
POTASSIUM SERPL-SCNC: 4.4 MMOL/L (ref 3.5–5.2)
PROT SERPL-MCNC: 7.6 G/DL (ref 6–8.5)
RBC # BLD AUTO: 5.14 X10E6/UL (ref 4.14–5.8)
SODIUM SERPL-SCNC: 142 MMOL/L (ref 134–144)
WBC # BLD AUTO: 5.7 X10E3/UL (ref 3.4–10.8)

## 2019-01-30 LAB — HEMOCCULT STL QL IA: NEGATIVE

## 2019-02-15 RX ORDER — TRIAMCINOLONE ACETONIDE 40 MG/ML
40 INJECTION, SUSPENSION INTRA-ARTICULAR; INTRAMUSCULAR ONCE
Qty: 1 ML | Refills: 0
Start: 2019-02-15 | End: 2019-02-15

## 2019-02-15 RX ORDER — LIDOCAINE HYDROCHLORIDE 20 MG/ML
1 INJECTION, SOLUTION EPIDURAL; INFILTRATION; INTRACAUDAL; PERINEURAL ONCE
Qty: 1 ML | Refills: 0
Start: 2019-02-15 | End: 2019-02-15

## 2019-04-08 ENCOUNTER — OFFICE VISIT (OUTPATIENT)
Dept: INTERNAL MEDICINE CLINIC | Age: 54
End: 2019-04-08

## 2019-04-08 VITALS
WEIGHT: 164 LBS | HEIGHT: 69 IN | HEART RATE: 75 BPM | TEMPERATURE: 98.3 F | OXYGEN SATURATION: 98 % | BODY MASS INDEX: 24.29 KG/M2 | RESPIRATION RATE: 16 BRPM | DIASTOLIC BLOOD PRESSURE: 76 MMHG | SYSTOLIC BLOOD PRESSURE: 120 MMHG

## 2019-04-08 DIAGNOSIS — M54.2 NECK PAIN: ICD-10-CM

## 2019-04-08 DIAGNOSIS — G47.30 SLEEP APNEA, UNSPECIFIED TYPE: ICD-10-CM

## 2019-04-08 DIAGNOSIS — I10 ESSENTIAL HYPERTENSION: Primary | ICD-10-CM

## 2019-04-08 RX ORDER — AMLODIPINE BESYLATE 2.5 MG/1
2.5 TABLET ORAL DAILY
Qty: 90 TAB | Refills: 3 | Status: SHIPPED | OUTPATIENT
Start: 2019-04-08 | End: 2020-01-20 | Stop reason: SDUPTHER

## 2019-04-08 NOTE — PATIENT INSTRUCTIONS
Readmill Activation    Thank you for requesting access to [facility]. Please follow the instructions below to securely access and download your online medical record. [facility] allows you to send messages to your doctor, view your test results, renew your prescriptions, schedule appointments, and more. How Do I Sign Up? 1. In your internet browser, go to [email or web address]  2. Click on the First Time User? Click Here link in the Sign In box. You will be redirect to the New Member Sign Up page. 3. Enter your Readmill Access Code exactly as it appears below. You will not need to use this code after youve completed the sign-up process. If you do not sign up before the expiration date, you must request a new code. Readmill Access Code: [email or web address]  Expires: 2019  1:37 PM (This is the date your Readmill access code will )    4. Enter the last four digits of your Social Security Number (xxxx) and Date of Birth (mm/dd/yyyy) as indicated and click Submit. You will be taken to the next sign-up page. 5. Create a Readmill ID. This will be your Readmill login ID and cannot be changed, so think of one that is secure and easy to remember. 6. Create a Readmill password. You can change your password at any time. 7. Enter your Password Reset Question and Answer. This can be used at a later time if you forget your password. 8. Enter your e-mail address. You will receive e-mail notification when new information is available in [address] 9. Click Sign Up. You can now view and download portions of your medical record. 10. Click the Download Summary menu link to download a portable copy of your medical information. Additional Information    If you have questions, please visit the Frequently Asked Questions section of the Readmill website at [email or web address] Remember, Readmill is NOT to be used for urgent needs. For medical emergencies, dial 911.

## 2019-04-08 NOTE — PROGRESS NOTES
1. Have you been to the ER, urgent care clinic since your last visit? Hospitalized since your last visit?no    2. Have you seen or consulted any other health care providers outside of the [address] [place] [address] since your last visit? Include any pap smears or colon screening. No    3 most recent PHQ Screens 1/22/2019   Little interest or pleasure in doing things Not at all   Feeling down, depressed, irritable, or hopeless Not at all   Total Score PHQ 2 0     Per Dr. Elmo Kearney.,  verbal order given for needed amb poc labs.

## 2019-04-08 NOTE — PROGRESS NOTES
Guzman Butts is a 48 y.o. male and presents with Results (labs)  . Subjective:  Neck Pain Review:  Patient complains of neck pain. Onset of symptoms was a few weeks ago, gradually worsening since that time. Current symptoms are pain in neck (aching, dull in character; /10 in severity), weakness in back. Patient denies numbness, tingling, paresthesias in upper extremities. Patient denies weakness, diminished  strength, lack of coordination. Radiation of pain: . Patient has had  prior neck problems. Previous treatments include: medication: . His last xray was normal        Sleep apnea Review: There is a history of  daytime fatigue with associated excessive waking  at night short of breath. There is also a history of periods of apnea at night as witnessed by a partner and family  The patient is noton CPAP. Hypertension Review:  The patient has labile hypertension  Diet and Lifestyle: generally follows a  low sodium diet, exercises sporadically  Home BP Monitoring: is not measured at home. Pertinent ROS: taking medications as instructed, no medication side effects noted, no TIA's, no chest pain on exertion, no dyspnea on exertion, no swelling of ankles. he has stopped medication          Review of Systems  Constitutional: negative for fevers, chills, anorexia and weight loss  Eyes:   negative for visual disturbance and irritation  ENT:   negative for tinnitus,sore throat,nasal congestion,ear pains. hoarseness  Respiratory:  negative for cough, hemoptysis, dyspnea,wheezing  CV:   negative for chest pain, palpitations, lower extremity edema  GI:   negative for nausea, vomiting, diarrhea, abdominal pain,melena  Endo:               negative for polyuria,polydipsia,polyphagia,heat intolerance  Genitourinary: negative for frequency, dysuria and hematuria  Integument:  negative for rash and pruritus  Hematologic:  negative for easy bruising and gum/nose bleeding  Musculoskel: negative for myalgias, arthralgias, back pain, muscle weakness, joint pain  Neurological:  negative for headaches, dizziness, vertigo, memory problems and gait   Behavl/Psych: negative for feelings of anxiety, depression, mood changes    Past Medical History:   Diagnosis Date    Anxiety     anxiety    Chronic pain     back    Gastrointestinal disorder     GERD (gastroesophageal reflux disease)     H/O Bell's palsy     left side of face tingles, intermittently, not frequently    Hyperlipidemia     Hypertension     Meningitis     spinal meningitis  ; resolved as of 4/3/14    Positive PPD     CXR have been negative per pt 14    Tennis elbow 4/3/14    left      Past Surgical History:   Procedure Laterality Date    HX ORTHOPAEDIC      right knee ligament repair    HX ORTHOPAEDIC      right  hand    HX ORTHOPAEDIC      right rotator cuff     Social History     Socioeconomic History    Marital status: SINGLE     Spouse name: Not on file    Number of children: Not on file    Years of education: Not on file    Highest education level: Not on file   Tobacco Use    Smoking status: Former Smoker     Packs/day: 0.25     Years: 20.00     Pack years: 5.00     Last attempt to quit: 2019     Years since quittin.2    Smokeless tobacco: Never Used   Substance and Sexual Activity    Alcohol use:  Yes     Alcohol/week: 0.0 oz     Comment: occassional    Drug use: Yes     Frequency: 7.0 times per week     Types: Marijuana    Sexual activity: Yes     Partners: Female   Social History Narrative    ** Merged History Encounter **          Family History   Problem Relation Age of Onset    Hypertension Mother     Diabetes Father     Hypertension Father     Cancer Brother         prostate    Heart Disease Neg Hx     Heart Attack Neg Hx     High Cholesterol Neg Hx     Stroke Neg Hx      Current Outpatient Medications   Medication Sig Dispense Refill    albuterol (VENTOLIN HFA) 90 mcg/actuation inhaler INHALE 2 PUFFS BY MOUTH EVERY 4 HOURS AS NEEDED FOR WHEEZING (Patient not taking: Reported on 4/8/2019) 1 Inhaler 11    lidocaine (XYLOCAINE) 2 % jelly Apply 4 times daily as needed to gum line (Patient not taking: Reported on 4/8/2019) 1 Tube 0    amLODIPine (NORVASC) 2.5 mg tablet Take 1 Tab by mouth daily. (Patient not taking: Reported on 4/8/2019) 30 Tab 0    triamcinolone acetonide (KENALOG) 0.1 % topical cream Apply  to affected area two (2) times a day. (Patient not taking: Reported on 4/8/2019) 60 g 12    albuterol (PROVENTIL VENTOLIN) 2.5 mg /3 mL (0.083 %) nebulizer solution USE 3 ML VIA NEBULIZER EVERY 4 HOURS AS NEEDED FOR WHEEZING (Patient not taking: Reported on 4/8/2019) 100 Each 11     No Known Allergies    Objective:  Visit Vitals  /76   Pulse 75   Temp 98.3 °F (36.8 °C) (Oral)   Resp 16   Ht 5' 9\" (1.753 m)   Wt 164 lb (74.4 kg)   SpO2 98%   BMI 24.22 kg/m²     Physical Exam:   General appearance - alert, well appearing, and in no distress  Mental status - alert, oriented to person, place, and time  EYE-FERNANDA, EOMI, corneas normal, no foreign bodies  ENT-ENT exam normal, no neck nodes or sinus tenderness  Nose - normal and patent, no erythema, discharge or polyps  Mouth - mucous membranes moist, pharynx normal without lesions  Neck - supple, no significant adenopathy   Chest - clear to auscultation, no wheezes, rales or rhonchi, symmetric air entry   Heart - normal rate, regular rhythm, normal S1, S2, no murmurs, rubs, clicks or gallops   Abdomen - soft, nontender, nondistended, no masses or organomegaly  Lymph- no adenopathy palpable  Ext-peripheral pulses normal, no pedal edema, no clubbing or cyanosis  Skin-Warm and dry.  no hyperpigmentation, vitiligo, or suspicious lesions  Neuro -alert, oriented, normal speech, no focal findings or movement disorder noted  Neck-normal C-spine, no tenderness, full ROM without pain  Feet-no nail deformities or callus formation with good pulses noted      Results for orders placed or performed in visit on 01/22/19   OCCULT BLOOD IMMUNOASSAY,DIAGNOSTIC   Result Value Ref Range    Occult blood fecal, by IA Negative Negative   CBC W/O DIFF   Result Value Ref Range    WBC 5.7 3.4 - 10.8 x10E3/uL    RBC 5.14 4.14 - 5.80 x10E6/uL    HGB 14.3 13.0 - 17.7 g/dL    HCT 43.2 37.5 - 51.0 %    MCV 84 79 - 97 fL    MCH 27.8 26.6 - 33.0 pg    MCHC 33.1 31.5 - 35.7 g/dL    RDW 15.1 12.3 - 15.4 %    PLATELET 291 555 - 592 [id number]/CL   METABOLIC PANEL, COMPREHENSIVE   Result Value Ref Range    Glucose 101 (H) 65 - 99 mg/dL    BUN 17 6 - 24 mg/dL    Creatinine 0.86 0.76 - 1.27 mg/dL    GFR est non- >59 mL/min/1.73    GFR est  >59 mL/min/1.73    BUN/Creatinine ratio 20 9 - 20    Sodium 142 134 - 144 mmol/L    Potassium 4.4 3.5 - 5.2 mmol/L    Chloride 102 96 - 106 mmol/L    CO2 28 20 - 29 mmol/L    Calcium 9.6 8.7 - 10.2 mg/dL    Protein, total 7.6 6.0 - 8.5 g/dL    Albumin 5.0 3.5 - 5.5 g/dL    GLOBULIN, TOTAL 2.6 1.5 - 4.5 g/dL    A-G Ratio 1.9 1.2 - 2.2    Bilirubin, total 0.9 0.0 - 1.2 mg/dL    Alk. phosphatase 79 39 - 117 IU/L    AST (SGOT) 16 0 - 40 IU/L    ALT (SGPT) 15 0 - 44 IU/L       Assessment/Plan:  No diagnosis found. No orders of the defined types were placed in this encounter. lose weight, stop smoking (advice and handout given), bring BP log to office visit,Take 81mg aspirin daily  There are no Patient Instructions on file for this visit. I have reviewed with the patient details of the assessment and plan and all questions were answered. Relevent patient education was performed. The most recent lab findings were reviewed with the patient. An After Visit Summary was printed and given to the patient.

## 2019-08-02 ENCOUNTER — OFFICE VISIT (OUTPATIENT)
Dept: INTERNAL MEDICINE CLINIC | Age: 54
End: 2019-08-02

## 2019-08-02 VITALS
RESPIRATION RATE: 16 BRPM | BODY MASS INDEX: 23.85 KG/M2 | SYSTOLIC BLOOD PRESSURE: 104 MMHG | WEIGHT: 161 LBS | HEIGHT: 69 IN | TEMPERATURE: 98.1 F | HEART RATE: 80 BPM | OXYGEN SATURATION: 97 % | DIASTOLIC BLOOD PRESSURE: 66 MMHG

## 2019-08-02 DIAGNOSIS — R35.0 URINE FREQUENCY: ICD-10-CM

## 2019-08-02 DIAGNOSIS — M12.812 ROTATOR CUFF ARTHROPATHY OF LEFT SHOULDER: Primary | ICD-10-CM

## 2019-08-02 DIAGNOSIS — I10 ESSENTIAL HYPERTENSION: ICD-10-CM

## 2019-08-02 DIAGNOSIS — G47.30 SLEEP APNEA, UNSPECIFIED TYPE: ICD-10-CM

## 2019-08-02 LAB
CHOLEST SERPL-MCNC: 159 MG/DL
HDLC SERPL-MCNC: 38 MG/DL
LDL CHOLESTEROL POC: 103 MG/DL
NON-HDL CHOLESTEROL, 011976: 121
TCHOL/HDL RATIO (POC): 4.2
TRIGL SERPL-MCNC: 89 MG/DL

## 2019-08-02 RX ORDER — TRIAMCINOLONE ACETONIDE 1 MG/G
CREAM TOPICAL 2 TIMES DAILY
Qty: 60 G | Refills: 12 | Status: SHIPPED | OUTPATIENT
Start: 2019-08-02 | End: 2021-10-29

## 2019-08-02 RX ORDER — TRIAMCINOLONE ACETONIDE 40 MG/ML
40 INJECTION, SUSPENSION INTRA-ARTICULAR; INTRAMUSCULAR ONCE
Qty: 1 ML | Refills: 0
Start: 2019-08-02 | End: 2019-08-02

## 2019-08-02 RX ORDER — LIDOCAINE HYDROCHLORIDE 20 MG/ML
1 INJECTION, SOLUTION EPIDURAL; INFILTRATION; INTRACAUDAL; PERINEURAL ONCE
Qty: 1 ML | Refills: 0
Start: 2019-08-02 | End: 2019-08-02

## 2019-08-02 NOTE — PATIENT INSTRUCTIONS
/Greenlight Planet Activation    Thank you for requesting access to [facility]. Please follow the instructions below to securely access and download your online medical record. Green[facility] Planet allows you to send messages to your doctor, view your test results, renew your prescriptions, schedule appointments, and more. How Do I Sign Up? 1. In your internet browser, go to [email or web address]  2. Click on the First Time User? Click Here link in the Sign In box. You will be redirect to the New Member Sign Up page. 3. Enter your Greenlight Planet Access Code exactly as it appears below. You will not need to use this code after youve completed the sign-up process. If you do not sign up before the expiration date, you must request a new code. Greenlight Planet Access Code: [facility]  Expires: 2019 10:18 AM (This is the date your Greenlight Planet access code will )    4. Enter the last four digits of your Social Security Number (xxxx) and Date of Birth (mm/dd/yyyy) as indicated and click Submit. You will be taken to the next sign-up page. 5. Create a Greenlight Planet ID. This will be your Greenlight Planet login ID and cannot be changed, so think of one that is secure and easy to remember. 6. Create a Greenlight Planet password. You can change your password at any time. 7. Enter your Password Reset Question and Answer. This can be used at a later time if you forget your password. 8. Enter your e-mail address. You will receive e-mail notification when new information is available in [address] 9. Click Sign Up. You can now view and download portions of your medical record. 10. Click the Download Summary menu link to download a portable copy of your medical information. Additional Information    If you have questions, please visit the Frequently Asked Questions section of the Greenlight Planet website at [email or web address] Remember, Greenlight Planet is NOT to be used for urgent needs. For medical emergencies, dial 911.

## 2019-08-02 NOTE — PROGRESS NOTES
1. Have you been to the ER, urgent care clinic since your last visit? Hospitalized since your last visit?no    2. Have you seen or consulted any other health care providers outside of the [address] [place] [address] since your last visit? Include any pap smears or colon screening. No    3 most recent PHQ Screens 1/22/2019   Little interest or pleasure in doing things Not at all   Feeling down, depressed, irritable, or hopeless Not at all   Total Score PHQ 2 0       Per Dr. Lane Kimball.,  verbal order given for needed amb poc labs.     Chief Complaint   Patient presents with    Hypertension

## 2019-08-02 NOTE — PROGRESS NOTES
Lara Biswas is a 48 y.o. male and presents with Hypertension  . Subjective:    Shoulder Pain Review:  Patient complains of left side shoulder pain. The symptoms began months ago Course of symptoms since onset has been gradually worsening. Pain is described as overall severity = moderate. Symptoms were incited by no known event. Patient denies N/A. Therapy to date includes OTC analgesics: effective. Neck Pain Review:  Patient complains of neck pains have resolved. Sleep apnea Review: There is a history of  daytime fatigue with associated excessive waking  at night short of breath. There is also a history of periods of apnea at night as witnessed by a partner and family  The patient is noton CPAP. Hypertension Review:  The patient has labile hypertension  Diet and Lifestyle: generally follows a  low sodium diet, exercises sporadically  Home BP Monitoring: is not measured at home. Pertinent ROS: taking medications as instructed, no medication side effects noted, no TIA's, no chest pain on exertion, no dyspnea on exertion, no swelling of ankles. he has stopped medication          Review of Systems  Constitutional: negative for fevers, chills, anorexia and weight loss  Eyes:   negative for visual disturbance and irritation  ENT:   negative for tinnitus,sore throat,nasal congestion,ear pains. hoarseness  Respiratory:  negative for cough, hemoptysis, dyspnea,wheezing  CV:   negative for chest pain, palpitations, lower extremity edema  GI:   negative for nausea, vomiting, diarrhea, abdominal pain,melena  Endo:               negative for polyuria,polydipsia,polyphagia,heat intolerance  Genitourinary: negative for frequency, dysuria and hematuria  Integument:  negative for rash and pruritus  Hematologic:  negative for easy bruising and gum/nose bleeding  Musculoskel: negative for myalgias, arthralgias, back pain, muscle weakness, joint pain  Neurological:  negative for headaches, dizziness, vertigo, memory problems and gait   Behavl/Psych: negative for feelings of anxiety, depression, mood changes    Past Medical History:   Diagnosis Date    Anxiety     anxiety    Chronic pain     back    Gastrointestinal disorder     GERD (gastroesophageal reflux disease)     H/O Bell's palsy     left side of face tingles, intermittently, not frequently    Hyperlipidemia     Hypertension     Meningitis     spinal meningitis  ; resolved as of 4/3/14    Positive PPD     CXR have been negative per pt 14    Tennis elbow 4/3/14    left      Past Surgical History:   Procedure Laterality Date    HX ORTHOPAEDIC      right knee ligament repair    HX ORTHOPAEDIC      right  hand    HX ORTHOPAEDIC      right rotator cuff     Social History     Socioeconomic History    Marital status: SINGLE     Spouse name: Not on file    Number of children: Not on file    Years of education: Not on file    Highest education level: Not on file   Tobacco Use    Smoking status: Former Smoker     Packs/day: 0.25     Years: 20.00     Pack years: 5.00     Last attempt to quit: 2019     Years since quittin.5    Smokeless tobacco: Never Used   Substance and Sexual Activity    Alcohol use: Yes     Alcohol/week: 0.0 standard drinks     Comment: occassional    Drug use: Yes     Frequency: 7.0 times per week     Types: Marijuana    Sexual activity: Yes     Partners: Female   Social History Narrative    ** Merged History Encounter **          Family History   Problem Relation Age of Onset    Hypertension Mother     Diabetes Father     Hypertension Father     Cancer Brother         prostate    Heart Disease Neg Hx     Heart Attack Neg Hx     High Cholesterol Neg Hx     Stroke Neg Hx      Current Outpatient Medications   Medication Sig Dispense Refill    amLODIPine (NORVASC) 2.5 mg tablet Take 1 Tab by mouth daily.  90 Tab 3     No Known Allergies    Objective:  Visit Vitals  /66   Pulse 80   Temp 98.1 °F (36.7 °C) (Oral)   Resp 16   Ht 5' 9\" (1.753 m)   Wt 161 lb (73 kg)   SpO2 97%   BMI 23.78 kg/m²     Physical Exam:   General appearance - alert, well appearing, and in no distress  Mental status - alert, oriented to person, place, and time  EYE-FERNANDA, EOMI, corneas normal, no foreign bodies  ENT-ENT exam normal, no neck nodes or sinus tenderness  Nose - normal and patent, no erythema, discharge or polyps  Mouth - mucous membranes moist, pharynx normal without lesions  Neck - supple, no significant adenopathy   Chest - clear to auscultation, no wheezes, rales or rhonchi, symmetric air entry   Heart - normal rate, regular rhythm, normal S1, S2, no murmurs, rubs, clicks or gallops   Abdomen - soft, nontender, nondistended, no masses or organomegaly  Lymph- no adenopathy palpable  Ext-peripheral pulses normal, no pedal edema, no clubbing or cyanosis  Skin-Warm and dry. no hyperpigmentation, vitiligo, or suspicious lesions  Neuro -alert, oriented, normal speech, no focal findings or movement disorder noted  Neck-normal C-spine, no tenderness, full ROM without pain  Feet-no nail deformities or callus formation with good pulses noted  Lt.shoulder-.reduced range of motion of lt. Results for orders placed or performed in visit on 08/02/19   AMB POC LIPID PROFILE   Result Value Ref Range    Cholesterol (POC) 159     Triglycerides (POC) 89     HDL Cholesterol (POC) 38     Non-HDL Cholesterol 121     LDL Cholesterol (POC) 103 MG/DL    TChol/HDL Ratio (POC) 4.2        Assessment/Plan:    ICD-10-CM ICD-9-CM    1. Rotator cuff arthropathy of left shoulder M12.812 716.81    2. Essential hypertension I10 401.9 AMB POC LIPID PROFILE   3. Sleep apnea, unspecified type G47.30 780.57      Orders Placed This Encounter    AMB POC LIPID PROFILE     lose weight, stop smoking (advice and handout given), bring BP log to office visit,Take 81mg aspirin daily  There are no Patient Instructions on file for this visit.       Indications:   Symptomatic relief of pain    Procedure:  After consent was obtained, using sterile technique the left shoulder joint was prepped using alcohol. Local anesthetic used: 1% lidocaine. . The joint was entered and Kenalog 40 mg was mixed with 1% lidocaine 3 ml  and injected into the joint and the needle withdrawn. The procedure was well tolerated. The patient is asked to continue to rest the joint for a few more days before resuming regular activities. It may be more painful for the first 1-2 days. Watch for fever, or increased swelling or persistent pain in the joint. Call or return to clinic prn if such symptoms occur or there is failure to improve as anticipated. [facility]  OFFICE PROCEDURE PROGRESS NOTE        Chart reviewed for the following:   Wai Pablo MD, have reviewed the History, Physical and updated the Allergic reactions for [address] performed immediately prior to start of procedure:   Wai Pablo MD, have performed the following reviews on Jerrell Dc prior to the start of the procedure:            * Patient was identified by name and date of birth   * Agreement on procedure being performed was verified  * Risks and Benefits explained to the patient  * Procedure site verified and marked as necessary  * Patient was positioned for comfort       Time: 10:10pm      Date of procedure: 8/2/2019    Procedure performed by:  Nicolas Zabala MD    Patient assisted by: nursing attendant    How tolerated by patient: tolerated the procedure well with no complications    Comments: none                  I have reviewed with the patient details of the assessment and plan and all questions were answered. Relevent patient education was performed. The most recent lab findings were reviewed with the patient. An After Visit Summary was printed and given to the patient.

## 2019-08-13 LAB — PSA SERPL-MCNC: 2.4 NG/ML (ref 0–4)

## 2019-09-13 ENCOUNTER — ANESTHESIA EVENT (OUTPATIENT)
Dept: ENDOSCOPY | Age: 54
End: 2019-09-13
Payer: MEDICAID

## 2019-09-13 ENCOUNTER — HOSPITAL ENCOUNTER (OUTPATIENT)
Age: 54
Setting detail: OUTPATIENT SURGERY
Discharge: HOME | End: 2019-09-13
Attending: INTERNAL MEDICINE | Admitting: INTERNAL MEDICINE
Payer: MEDICAID

## 2019-09-13 ENCOUNTER — ANESTHESIA (OUTPATIENT)
Dept: ENDOSCOPY | Age: 54
End: 2019-09-13
Payer: MEDICAID

## 2019-09-13 VITALS
BODY MASS INDEX: 23.25 KG/M2 | DIASTOLIC BLOOD PRESSURE: 85 MMHG | SYSTOLIC BLOOD PRESSURE: 128 MMHG | TEMPERATURE: 98 F | RESPIRATION RATE: 16 BRPM | WEIGHT: 157 LBS | OXYGEN SATURATION: 98 % | HEART RATE: 59 BPM | HEIGHT: 69 IN

## 2019-09-13 PROCEDURE — 74011250636 HC RX REV CODE- 250/636: Performed by: NURSE ANESTHETIST, CERTIFIED REGISTERED

## 2019-09-13 PROCEDURE — 74011250636 HC RX REV CODE- 250/636: Performed by: INTERNAL MEDICINE

## 2019-09-13 PROCEDURE — 76040000019: Performed by: INTERNAL MEDICINE

## 2019-09-13 PROCEDURE — 76060000031 HC ANESTHESIA FIRST 0.5 HR: Performed by: INTERNAL MEDICINE

## 2019-09-13 PROCEDURE — 74011000250 HC RX REV CODE- 250: Performed by: NURSE ANESTHETIST, CERTIFIED REGISTERED

## 2019-09-13 RX ORDER — ATROPINE SULFATE 0.1 MG/ML
0.5 INJECTION INTRAVENOUS
Status: DISCONTINUED | OUTPATIENT
Start: 2019-09-13 | End: 2019-09-13 | Stop reason: HOSPADM

## 2019-09-13 RX ORDER — PROPOFOL 10 MG/ML
INJECTION, EMULSION INTRAVENOUS AS NEEDED
Status: DISCONTINUED | OUTPATIENT
Start: 2019-09-13 | End: 2019-09-13 | Stop reason: HOSPADM

## 2019-09-13 RX ORDER — SODIUM CHLORIDE 9 MG/ML
100 INJECTION, SOLUTION INTRAVENOUS CONTINUOUS
Status: DISCONTINUED | OUTPATIENT
Start: 2019-09-13 | End: 2019-09-13 | Stop reason: HOSPADM

## 2019-09-13 RX ORDER — LIDOCAINE HYDROCHLORIDE 20 MG/ML
5 SOLUTION OROPHARYNGEAL AS NEEDED
Status: DISCONTINUED | OUTPATIENT
Start: 2019-09-13 | End: 2019-09-13 | Stop reason: HOSPADM

## 2019-09-13 RX ORDER — LIDOCAINE HYDROCHLORIDE 20 MG/ML
INJECTION, SOLUTION EPIDURAL; INFILTRATION; INTRACAUDAL; PERINEURAL AS NEEDED
Status: DISCONTINUED | OUTPATIENT
Start: 2019-09-13 | End: 2019-09-13 | Stop reason: HOSPADM

## 2019-09-13 RX ORDER — FLUMAZENIL 0.1 MG/ML
0.2 INJECTION INTRAVENOUS
Status: DISCONTINUED | OUTPATIENT
Start: 2019-09-13 | End: 2019-09-13 | Stop reason: HOSPADM

## 2019-09-13 RX ORDER — SODIUM CHLORIDE 0.9 % (FLUSH) 0.9 %
5-40 SYRINGE (ML) INJECTION AS NEEDED
Status: DISCONTINUED | OUTPATIENT
Start: 2019-09-13 | End: 2019-09-13 | Stop reason: HOSPADM

## 2019-09-13 RX ORDER — NALOXONE HYDROCHLORIDE 0.4 MG/ML
0.4 INJECTION, SOLUTION INTRAMUSCULAR; INTRAVENOUS; SUBCUTANEOUS
Status: DISCONTINUED | OUTPATIENT
Start: 2019-09-13 | End: 2019-09-13 | Stop reason: HOSPADM

## 2019-09-13 RX ORDER — LORAZEPAM 2 MG/ML
2 INJECTION INTRAMUSCULAR AS NEEDED
Status: DISCONTINUED | OUTPATIENT
Start: 2019-09-13 | End: 2019-09-13 | Stop reason: HOSPADM

## 2019-09-13 RX ORDER — DEXTROMETHORPHAN/PSEUDOEPHED 2.5-7.5/.8
1.2 DROPS ORAL
Status: DISCONTINUED | OUTPATIENT
Start: 2019-09-13 | End: 2019-09-13 | Stop reason: HOSPADM

## 2019-09-13 RX ORDER — MIDAZOLAM HYDROCHLORIDE 1 MG/ML
5 INJECTION, SOLUTION INTRAMUSCULAR; INTRAVENOUS
Status: DISCONTINUED | OUTPATIENT
Start: 2019-09-13 | End: 2019-09-13 | Stop reason: HOSPADM

## 2019-09-13 RX ORDER — FENTANYL CITRATE 50 UG/ML
100 INJECTION, SOLUTION INTRAMUSCULAR; INTRAVENOUS ONCE
Status: DISCONTINUED | OUTPATIENT
Start: 2019-09-13 | End: 2019-09-13 | Stop reason: HOSPADM

## 2019-09-13 RX ORDER — EPINEPHRINE 0.1 MG/ML
1 INJECTION INTRACARDIAC; INTRAVENOUS
Status: DISCONTINUED | OUTPATIENT
Start: 2019-09-13 | End: 2019-09-13 | Stop reason: HOSPADM

## 2019-09-13 RX ORDER — SODIUM CHLORIDE 0.9 % (FLUSH) 0.9 %
5-40 SYRINGE (ML) INJECTION EVERY 8 HOURS
Status: DISCONTINUED | OUTPATIENT
Start: 2019-09-13 | End: 2019-09-13 | Stop reason: HOSPADM

## 2019-09-13 RX ORDER — DEXTROSE MONOHYDRATE AND SODIUM CHLORIDE 5; .9 G/100ML; G/100ML
100 INJECTION, SOLUTION INTRAVENOUS CONTINUOUS
Status: DISCONTINUED | OUTPATIENT
Start: 2019-09-13 | End: 2019-09-13 | Stop reason: HOSPADM

## 2019-09-13 RX ORDER — DIPHENHYDRAMINE HYDROCHLORIDE 50 MG/ML
50 INJECTION, SOLUTION INTRAMUSCULAR; INTRAVENOUS ONCE
Status: DISCONTINUED | OUTPATIENT
Start: 2019-09-13 | End: 2019-09-13 | Stop reason: HOSPADM

## 2019-09-13 RX ADMIN — LIDOCAINE HYDROCHLORIDE 40 MG: 20 INJECTION, SOLUTION EPIDURAL; INFILTRATION; INTRACAUDAL; PERINEURAL at 11:24

## 2019-09-13 RX ADMIN — PROPOFOL 50 MG: 10 INJECTION, EMULSION INTRAVENOUS at 11:26

## 2019-09-13 RX ADMIN — SODIUM CHLORIDE 100 ML/HR: 900 INJECTION, SOLUTION INTRAVENOUS at 10:29

## 2019-09-13 RX ADMIN — PROPOFOL 100 MG: 10 INJECTION, EMULSION INTRAVENOUS at 11:36

## 2019-09-13 RX ADMIN — PROPOFOL 100 MG: 10 INJECTION, EMULSION INTRAVENOUS at 11:24

## 2019-09-13 RX ADMIN — PROPOFOL 100 MG: 10 INJECTION, EMULSION INTRAVENOUS at 11:29

## 2019-09-13 RX ADMIN — PROPOFOL 30 MG: 10 INJECTION, EMULSION INTRAVENOUS at 11:40

## 2019-09-13 RX ADMIN — PROPOFOL 100 MG: 10 INJECTION, EMULSION INTRAVENOUS at 11:32
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
## 2019-09-13 NOTE — PROCEDURES
G I Procedure Note              COLONOSCOPY   Dr. Dunbar [facility] office   [address]                                   [id number]                                  [id number]   1965                                      48 y.o.                                    male      Procedure Date: 9/13/2019      [x]  Anesthesia MAC                                                                                Pre Op Diagnosis:                     1. COLON SCREENING                                                                                                                                                                        Post Op Diagnosis:                    1.   Colon: diverticulosis                                                                  H&p completed: Yes            Anesthesia Assessment: Performed prior to procedure:      No change  Anesthesia Plan: Performed prior to procedure:                   No change       Medications: See Reviewed List and Reconcilation           Informed consent was obtained     Risk Statement:  Prior to the procedure the risks were explained to the patient and/or to the family including but not limited to perforation, bleeding, adverse drug reaction, aspiration, and even the need for possible surgery. A colonoscopy exam is not 100% accurate which may be related to preparation or blind spots during the exam.The possibility that an abnormality and /or cancer could be missed was also discussed as well as alternative x-ray options. Instrument:    Olympus adult Videocolonoscope                                   Immediate Procedure Reassessment Completed     With the patient in the left lateral position, a rectal examination was performed and the findings were:negative, stool guaiac negative.   The Olympus Video colonoscope was inserted under direct vision into the rectum. The colonoscope was passed from the rectum to the cecum, which was identified by the ileocecal valve. The colon findings demonstrated:    ANUS: Anal exam reveals no masses or hemorrhoids, sphincter tone is normal.     RECTUM: Rectal exam reveals no masses or hemorrhoids. SIGMOID COLON: The patient was noted to have diverticulosis. The mucosa is normal with good vascular pattern and without ulcers or polyps. DESCENDING COLON:  The mucosa is normal with good vascular pattern and without ulcers or polyps. SPLENIC FLEXURE: The splenic flexure is normal.     TRANSVERSE COLON: The mucosa is normal with good vascular pattern and without ulcers or polyps. HEPATIC FLEXURE: The hepatic flexure is normal.     ASCENDING COLON: The mucosa is normal with good vascular pattern and without ulcers or polyps. CECUM:  The ileocecal valve appears normal.     Scatttered diverticulosis was noted. A      The colonoscope was slowly withdrawn >6 minute period and the instrument was retroflexed in the rectum. The rectal findings were:Normal  The patient tolerated the entire procedure well. Blood Loss nil  No complications  Anesthesia  MAC  No crystalloids  No Implants  Assistants : per nursing documentation team members     Colon preparation was good    Recommendations:     - For colon cancer screening in this average-risk patient, colonoscopy may be repeated in 10 years.       Copies sent to   Cristian Buck MD  CC:  Alba Montes MD

## 2019-09-13 NOTE — DISCHARGE INSTRUCTIONS
Endoscopy Discharge Instructions     Dr. Dela Cruz [place] office                                            NAME: [id number] Valdes QSDWXY:[id number]    AGE:  48 y.o. YOB: 1965                                                              FINAL Discharge Procedure and Diagnosis:       Procedure(s):  COLONOSCOPY       FINDINGS:     ***                                        MEDICATIONS    [x] CONTINUE CURRENT MEDICATIONS     [] NEW MEDICATIONS           1.    2.    3.         Testing   Schedule            *** Colonoscopy Screening                                   Recommendations       []  Repeat colonoscopy in 6-12 month 2nd        to Inadequate  prep    []  Repeat colonoscopy in 3 years    []  Repeat colonoscopy in 5 years    []  Repeat colonoscopy in 10 years         New additional  Tests  Call the office   [phone]) for the appointment time      []      []      []                                     YOUR NEXT APPOINTMENT WITH DR Romie Lennon:                                                                                                                            ***   []   None follow up with pcp   []  1 week       []   2 week    []  1 month    Always keep Denys Valdes MD for regular medical follow up                                                                                                                         If you had a colonoscopy the \"C\" indicates specific instructions        x                                           Diet Instructions :   Ordinarily you may resume your previous diet but your initial diet should be       Light your discharge nurse will go over this with you. Large meals can cause  abdominal discomfort after these procedures.                                          ***                                 Specific Diet Recommendations:        [x] High fiber diet. https://www.Fatigue Science. LiveOffice/diets/        [] GERD diet: avoid fried and fatty foods, peppermint, chocolate, alcohol,               coffee, citrus fruits and juices, and tomato products. Avoid lying down for            2 to 3  hours after eating. https://www.garcia.com/. LiveOffice/diets/            []  FODMAP DIET  DeathUnit.nl              []  All diets eg high fiber, gastroparesis. , weight loss , gluten free             1. Watsin              2.  [email or web address].Toplist/. LiveOffice/diets/           __x__  Luis Gavin may feel quite tired and need to rest and recuperate for several hours    following these procedures. __x__  Due to the fact that sedation was administered for this procedure, do not drive,   operate machinery or sign legal documents for the next 24 hours. __x__  Mild abdominal pain may be experienced after your procedure, but is should   disappear after several hours. Notify your physician if you have persistent pain,   tenderness or abdominal distension. __x__  C    Many patients for the first few hours following the exam may experience         belching or passing gas through the rectum. Walking may help to relieve        distention and gas pains. A warm bath or shower will often help with abdominal  cramping.                                                                                            __x__   Leanora Peek may return to your normal routine tomorrow, according to how you feel        and depending on your doctors instructions. Be sure to call your doctor to make  an appointment for a post-surgery check-up on the date your doctor has   requested. __x__ C     Rectal bleeding or spotting in small amounts may occur with the first bowel   movement following a colonoscopy or sigmoidoscopy.  If a large amount of blood is noted call immediately     __x__  You may experience a numbness or lack of sensation in throat. If present, do not     eat or drink. Before eating, test your ability to drink with small sips of water. Y     You may try clear liquids or soups. If you tolerate these, you may then eat solid     food which is not greasy or spicy. __x__ C     IF POLYPS REMOVED: Avoid any blood thinning medication such as plavix,   aspirin or coumadin  NSAIDS (like advil or alleve) for 7 days. __x__  Notify your physician if you cough or vomit blood or experience chest pain. Your biopsy or testing result should be available in 7-10 days                                                                                                                      Prescription will be electronically sent to your pharmacy you must     let your nurse know your pharmacy:                                                                                                                                          [address] [place] [address]. TO HELP ENSURE A SMOOTH RECOVERY,       IT IS IMPORTANT TO FOLLOW THEM. _x___Pamphlet /Educational Information provided for diagnostic findings     Additional education information can assessed at the sites below:   [place]   http://www.digestive. niddk.nih.gov/ddiseases/a-z.asp      Web MD patient information                                                                                                Signature of individual given instructions :   Date: 9/13/2019

## 2019-09-13 NOTE — PROGRESS NOTES
Randee Leader  1965  [id number]    Situation:  Verbal report received from: PAULINA Rojas  Procedure: Procedure(s):  COLONOSCOPY    Background:    Preoperative diagnosis: COLON SCREENING  Postoperative diagnosis: Colon: diverticulosis    :  Dr. Maurizio Archibald  Assistant(s): Endoscopy Technician-1: [facility]  Endoscopy RN-1: Crystal Kitchen RN    Specimens: * No specimens in log *  H. Pylori  no    Assessment:  Anesthesia gave intra-procedure sedation and medications, see anesthesia flow sheet yes    Intravenous fluids: NS@ KVO     Vital signs stable      Abdominal assessment: round and soft      Recommendation:  Discharge patient per MD order .   Family or Friend    Permission to share finding with family or friend yes

## 2019-09-13 NOTE — ANESTHESIA PREPROCEDURE EVALUATION
Relevant Problems   No relevant active problems       Anesthetic History   No history of anesthetic complications            Review of Systems / Medical History  Patient summary reviewed, nursing notes reviewed and pertinent labs reviewed    Pulmonary  Within defined limits                 Neuro/Psych   Within defined limits      Psychiatric history     Cardiovascular  Within defined limits  Hypertension          Hyperlipidemia    Exercise tolerance: >4 METS     GI/Hepatic/Renal  Within defined limits   GERD           Endo/Other  Within defined limits    Hypothyroidism  Arthritis     Other Findings              Physical Exam    Airway  Mallampati: II  TM Distance: 4 - 6 cm  Neck ROM: normal range of motion   Mouth opening: Normal     Cardiovascular  Regular rate and rhythm,  S1 and S2 normal,  no murmur, click, rub, or gallop             Dental  No notable dental hx       Pulmonary  Breath sounds clear to auscultation               Abdominal  GI exam deferred       Other Findings            Anesthetic Plan    ASA: 2  Anesthesia type: general and total IV anesthesia          Induction: Intravenous  Anesthetic plan and risks discussed with: Patient      Propofol MAC

## 2019-09-16 ENCOUNTER — OFFICE VISIT (OUTPATIENT)
Dept: INTERNAL MEDICINE CLINIC | Age: 54
End: 2019-09-16

## 2019-09-16 VITALS
TEMPERATURE: 97.9 F | BODY MASS INDEX: 23.7 KG/M2 | DIASTOLIC BLOOD PRESSURE: 82 MMHG | SYSTOLIC BLOOD PRESSURE: 124 MMHG | OXYGEN SATURATION: 99 % | HEIGHT: 69 IN | HEART RATE: 55 BPM | RESPIRATION RATE: 16 BRPM | WEIGHT: 160 LBS

## 2019-09-16 DIAGNOSIS — L84 PRE-ULCERATIVE CORN OR CALLOUS: Primary | ICD-10-CM

## 2019-09-16 NOTE — PROGRESS NOTES
Omari Mcdaniels is a 48 y.o. male     Chief Complaint   Patient presents with    Follow-up     colonoscopy follow up       Visit Vitals  /82 (BP 1 Location: Left arm, BP Patient Position: Sitting)   Pulse (!) 55   Temp 97.9 °F (36.6 °C) (Oral)   Resp 16   Ht 5' 9\" (1.753 m)   Wt 160 lb (72.6 kg)   SpO2 99%   BMI 23.63 kg/m²       Health Maintenance Due   Topic Date Due    Influenza Age 5 to Adult  08/01/2019       1. Have you been to the ER, urgent care clinic since your last visit? Hospitalized since your last visit? No    2. Have you seen or consulted any other health care providers outside of the [address] [place] [address] since your last visit? Include any pap smears or colon screening.  No

## 2019-09-16 NOTE — PATIENT INSTRUCTIONS
[employer] Activation    Thank you for requesting access to [employer]. Please follow the instructions below to securely access and download your online medical record. [employer] allows you to send messages to your doctor, view your test results, renew your prescriptions, schedule appointments, and more. How Do I Sign Up? 1. In your internet browser, go to [email or web address].[employer]  2. Click on the First Time User? Click Here link in the Sign In box. You will be redirect to the New Member Sign Up page. 3. Enter your [employer] Access Code exactly as it appears below. You will not need to use this code after youve completed the sign-up process. If you do not sign up before the expiration date, you must request a new code. Neuro[employer] Access Code: [email or web address]  Expires: 10/31/2019  4:14 PM (This is the date your [employer] access code will )    4. Enter the last four digits of your Social Security Number (xxxx) and Date of Birth (mm/dd/yyyy) as indicated and click Submit. You will be taken to the next sign-up page. 5. Create a Neuro[employer]ica ID. This will be your NeuroLogica login ID and cannot be changed, so think of one that is secure and easy to remember. 6. Create a Neuro[employer] password. You can change your password at any time. 7. Enter your Password Reset Question and Answer. This can be used at a later time if you forget your password. 8. Enter your e-mail address. You will receive e-mail notification when new information is available in [address] 9. Click Sign Up. You can now view and download portions of your medical record. 10. Click the Download Summary menu link to download a portable copy of your medical information. Additional Information    If you have questions, please visit the Frequently Asked Questions section of the Neuro[employer] website at [email or web address] Remember, [employer] is NOT to be used for urgent needs. For medical emergencies, dial 911.
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
## 2019-09-17 ENCOUNTER — HOSPITAL ENCOUNTER (EMERGENCY)
Age: 54
Discharge: HOME | End: 2019-09-18
Attending: EMERGENCY MEDICINE
Payer: MEDICAID

## 2019-09-17 DIAGNOSIS — K85.90 ACUTE PANCREATITIS, UNSPECIFIED COMPLICATION STATUS, UNSPECIFIED PANCREATITIS TYPE: ICD-10-CM

## 2019-09-17 DIAGNOSIS — R07.9 ACUTE CHEST PAIN: Primary | ICD-10-CM

## 2019-09-17 LAB
BASOPHILS # BLD: 0 K/UL (ref 0–0.1)
BASOPHILS NFR BLD: 0 % (ref 0–1)
DIFFERENTIAL METHOD BLD: NORMAL
EOSINOPHIL # BLD: 0.1 K/UL (ref 0–0.4)
EOSINOPHIL NFR BLD: 2 % (ref 0–7)
ERYTHROCYTE [DISTWIDTH] IN BLOOD BY AUTOMATED COUNT: 14.5 % (ref 11.5–14.5)
HCT VFR BLD AUTO: 41.2 % (ref 36.6–50.3)
HGB BLD-MCNC: 13.4 G/DL (ref 12.1–17)
IMM GRANULOCYTES # BLD AUTO: 0 K/UL (ref 0–0.04)
IMM GRANULOCYTES NFR BLD AUTO: 0 % (ref 0–0.5)
LYMPHOCYTES # BLD: 2.3 K/UL (ref 0.8–3.5)
LYMPHOCYTES NFR BLD: 40 % (ref 12–49)
MCH RBC QN AUTO: 27.8 PG (ref 26–34)
MCHC RBC AUTO-ENTMCNC: 32.5 G/DL (ref 30–36.5)
MCV RBC AUTO: 85.5 FL (ref 80–99)
MONOCYTES # BLD: 0.5 K/UL (ref 0–1)
MONOCYTES NFR BLD: 9 % (ref 5–13)
NEUTS SEG # BLD: 2.9 K/UL (ref 1.8–8)
NEUTS SEG NFR BLD: 49 % (ref 32–75)
NRBC # BLD: 0 K/UL (ref 0–0.01)
NRBC BLD-RTO: 0 PER 100 WBC
PLATELET # BLD AUTO: 177 K/UL (ref 150–400)
PMV BLD AUTO: 10 FL (ref 8.9–12.9)
RBC # BLD AUTO: 4.82 M/UL (ref 4.1–5.7)
WBC # BLD AUTO: 5.9 K/UL (ref 4.1–11.1)

## 2019-09-17 PROCEDURE — 82550 ASSAY OF CK (CPK): CPT

## 2019-09-17 PROCEDURE — 83690 ASSAY OF LIPASE: CPT

## 2019-09-17 PROCEDURE — 36415 COLL VENOUS BLD VENIPUNCTURE: CPT

## 2019-09-17 PROCEDURE — 85025 COMPLETE CBC W/AUTO DIFF WBC: CPT

## 2019-09-17 PROCEDURE — 93005 ELECTROCARDIOGRAM TRACING: CPT

## 2019-09-17 PROCEDURE — 84484 ASSAY OF TROPONIN QUANT: CPT

## 2019-09-17 PROCEDURE — 80053 COMPREHEN METABOLIC PANEL: CPT

## 2019-09-17 PROCEDURE — 99285 EMERGENCY DEPT VISIT HI MDM: CPT

## 2019-09-18 ENCOUNTER — APPOINTMENT (OUTPATIENT)
Dept: GENERAL RADIOLOGY | Age: 54
End: 2019-09-18
Attending: EMERGENCY MEDICINE
Payer: MEDICAID

## 2019-09-18 VITALS
HEIGHT: 69 IN | HEART RATE: 63 BPM | BODY MASS INDEX: 23.12 KG/M2 | TEMPERATURE: 97.8 F | RESPIRATION RATE: 17 BRPM | OXYGEN SATURATION: 98 % | SYSTOLIC BLOOD PRESSURE: 123 MMHG | WEIGHT: 156.09 LBS | DIASTOLIC BLOOD PRESSURE: 80 MMHG

## 2019-09-18 LAB
ALBUMIN SERPL-MCNC: 4 G/DL (ref 3.5–5)
ALBUMIN/GLOB SERPL: 1.2 {RATIO} (ref 1.1–2.2)
ALP SERPL-CCNC: 78 U/L (ref 45–117)
ALT SERPL-CCNC: 25 U/L (ref 12–78)
ANION GAP SERPL CALC-SCNC: 5 MMOL/L (ref 5–15)
AST SERPL-CCNC: 13 U/L (ref 15–37)
ATRIAL RATE: 71 BPM
BILIRUB SERPL-MCNC: 0.8 MG/DL (ref 0.2–1)
BUN SERPL-MCNC: 23 MG/DL (ref 6–20)
BUN/CREAT SERPL: 20 (ref 12–20)
CALCIUM SERPL-MCNC: 8.2 MG/DL (ref 8.5–10.1)
CALCULATED P AXIS, ECG09: 70 DEGREES
CALCULATED R AXIS, ECG10: 38 DEGREES
CALCULATED T AXIS, ECG11: 34 DEGREES
CHLORIDE SERPL-SCNC: 107 MMOL/L (ref 97–108)
CK SERPL-CCNC: 181 U/L (ref 39–308)
CO2 SERPL-SCNC: 28 MMOL/L (ref 21–32)
COMMENT, HOLDF: NORMAL
CREAT SERPL-MCNC: 1.14 MG/DL (ref 0.7–1.3)
D DIMER PPP FEU-MCNC: <0.19 MG/L FEU (ref 0–0.65)
DIAGNOSIS, 93000: NORMAL
GLOBULIN SER CALC-MCNC: 3.4 G/DL (ref 2–4)
GLUCOSE SERPL-MCNC: 110 MG/DL (ref 65–100)
LIPASE SERPL-CCNC: 1139 U/L (ref 73–393)
P-R INTERVAL, ECG05: 166 MS
POTASSIUM SERPL-SCNC: 3.3 MMOL/L (ref 3.5–5.1)
PROT SERPL-MCNC: 7.4 G/DL (ref 6.4–8.2)
Q-T INTERVAL, ECG07: 348 MS
QRS DURATION, ECG06: 86 MS
QTC CALCULATION (BEZET), ECG08: 378 MS
SAMPLES BEING HELD,HOLD: NORMAL
SODIUM SERPL-SCNC: 140 MMOL/L (ref 136–145)
TROPONIN I SERPL-MCNC: <0.05 NG/ML
VENTRICULAR RATE, ECG03: 71 BPM

## 2019-09-18 PROCEDURE — 71046 X-RAY EXAM CHEST 2 VIEWS: CPT

## 2019-09-18 PROCEDURE — 36415 COLL VENOUS BLD VENIPUNCTURE: CPT

## 2019-09-18 PROCEDURE — 85379 FIBRIN DEGRADATION QUANT: CPT

## 2019-09-18 RX ORDER — NAPROXEN 500 MG/1
500 TABLET ORAL
Qty: 20 TAB | Refills: 0 | Status: SHIPPED | OUTPATIENT
Start: 2019-09-18 | End: 2020-01-20 | Stop reason: ALTCHOICE
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
## 2019-09-18 NOTE — DISCHARGE INSTRUCTIONS
Learning About Acute Pancreatitis  What is acute pancreatitis? The pancreas is an organ behind the stomach. It makes hormones like insulin that help control how your body uses sugar. It also makes enzymes that help you digest food. Usually these enzymes flow from the pancreas to the intestines. But if they leak into the pancreas, they can irritate it and cause pain and swelling. When this happens suddenly, it's called acute pancreatitis. What causes it? Most of the time, acute pancreatitis is caused by gallstones or by heavy alcohol use. But several other things can cause it, such as:  · High levels of fats in the blood. · An injury. · A problem after a surgery or a procedure. · Certain medicines. What are the symptoms? The main symptom is pain in the upper belly. The pain can be severe. You also may have a fever, nausea, or vomiting. Some people get so sick that they have problems breathing. They also may have low blood pressure. How is it diagnosed? Your doctor will diagnose pancreatitis with an exam and by looking at your lab tests. Your doctor may think that you have this problem based on your symptoms and where you have pain in your belly. You may have blood tests of enzymes called amylase and lipase. In pancreatitis, the level of these enzymes is usually much higher than normal.  You also may have imaging tests of the belly. These may include an ultrasound, a CT scan, or an MRI. A special MRI called MRCP can show images of the bile ducts. This test can be very helpful when gallstones are causing the problem. How is it treated? Treatment of acute pancreatitis usually takes place in the hospital. It focuses on taking care of pain and supporting your body while your pancreas heals. In severe cases, treatment may occur in an intensive care unit to support breathing, treat serious infections, or help raise very low blood pressure.   If a gallstone is causing the problem, the gallstone may need to be removed. This is done during a procedure called ERCP. The doctor puts a scope in your mouth and moves it gently through the stomach and into the ducts from the pancreas and gallbladder. The doctor is then able to see a stone and remove it. Sometimes the gallbladder, which makes gallstones, needs to be removed by surgery. People with pancreatitis often need a lot of fluid to help support their other organs and their blood pressure. They get fluids through a vein (intravenous, or IV). Instead of food by mouth, nutrition is sometimes given through a vein while the pancreas heals. Follow-up care is a key part of your treatment and safety. Be sure to make and go to all appointments, and call your doctor if you are having problems. It's also a good idea to know your test results and keep a list of the medicines you take. Where can you learn more? Go to http://oskarGraffitidelma.info/. Enter T693 in the search box to learn more about \"Learning About Acute Pancreatitis. \"  Current as of: November 7, 2018  Content Version: 12.1  © 9923-9632 [employer]. Care instructions adapted under license by [employer] (which disclaims liability or warranty for this information). If you have questions about a medical condition or this instruction, always ask your healthcare professional. [address] any warranty or liability for your use of this information. Patient Education        Chest Pain: Care Instructions  Your Care Instructions    There are many things that can cause chest pain. Some are not serious and will get better on their own in a few days. But some kinds of chest pain need more testing and treatment. Your doctor may have recommended a follow-up visit in the next 8 to 12 hours. If you are not getting better, you may need more tests or treatment. Even though your doctor has released you, you still need to watch for any problems.  The doctor carefully checked you, but sometimes problems can develop later. If you have new symptoms or if your symptoms do not get better, get medical care right away. If you have worse or different chest pain or pressure that lasts more than 5 minutes or you passed out (lost consciousness), call 911 or seek other emergency help right away. A medical visit is only one step in your treatment. Even if you feel better, you still need to do what your doctor recommends, such as going to all suggested follow-up appointments and taking medicines exactly as directed. This will help you recover and help prevent future problems. How can you care for yourself at home? · Rest until you feel better. · Take your medicine exactly as prescribed. Call your doctor if you think you are having a problem with your medicine. · Do not drive after taking a prescription pain medicine. When should you call for help? Call 911 if:    · You passed out (lost consciousness).     · You have severe difficulty breathing.     · You have symptoms of a heart attack. These may include:  ? Chest pain or pressure, or a strange feeling in your chest.  ? Sweating. ? Shortness of breath. ? Nausea or vomiting. ? Pain, pressure, or a strange feeling in your back, neck, jaw, or upper belly or in one or both shoulders or arms. ? Lightheadedness or sudden weakness. ? A fast or irregular heartbeat. After you call 911, the  may tell you to chew 1 adult-strength or 2 to 4 low-dose aspirin. Wait for an ambulance. Do not try to drive yourself.    Call your doctor today if:    · You have any trouble breathing.     · Your chest pain gets worse.     · You are dizzy or lightheaded, or you feel like you may faint.     · You are not getting better as expected.     · You are having new or different chest pain. Where can you learn more? Go to http://oskar-delma.info/.   Enter A120 in the search box to learn more about \"Chest Pain: Care Instructions. \"  Current as of: September 23, 2018  Content Version: 12.1  © 4700-5105 [employer]. Care instructions adapted under license by [employer] (which disclaims liability or warranty for this information). If you have questions about a medical condition or this instruction, always ask your healthcare professional. [place] [address] any warranty or liability for your use of this information.

## 2019-09-18 NOTE — ED NOTES
Chest pain started around 5pm this evening.  Pt states it was a sharp pain around 11pm. He denies pain now

## 2019-10-02 ENCOUNTER — APPOINTMENT (OUTPATIENT)
Dept: CT IMAGING | Age: 54
End: 2019-10-02
Attending: EMERGENCY MEDICINE
Payer: MEDICAID

## 2019-10-02 ENCOUNTER — HOSPITAL ENCOUNTER (EMERGENCY)
Age: 54
Discharge: HOME | End: 2019-10-02
Attending: EMERGENCY MEDICINE
Payer: MEDICAID

## 2019-10-02 VITALS
RESPIRATION RATE: 17 BRPM | HEIGHT: 65 IN | BODY MASS INDEX: 25.97 KG/M2 | SYSTOLIC BLOOD PRESSURE: 131 MMHG | OXYGEN SATURATION: 99 % | WEIGHT: 155.87 LBS | DIASTOLIC BLOOD PRESSURE: 82 MMHG | TEMPERATURE: 97.9 F | HEART RATE: 49 BPM

## 2019-10-02 DIAGNOSIS — R42 VERTIGO: Primary | ICD-10-CM

## 2019-10-02 DIAGNOSIS — J01.90 ACUTE NON-RECURRENT SINUSITIS, UNSPECIFIED LOCATION: ICD-10-CM

## 2019-10-02 LAB
ALBUMIN SERPL-MCNC: 4 G/DL (ref 3.5–5)
ALBUMIN/GLOB SERPL: 1.3 {RATIO} (ref 1.1–2.2)
ALP SERPL-CCNC: 71 U/L (ref 45–117)
ALT SERPL-CCNC: 20 U/L (ref 12–78)
ANION GAP SERPL CALC-SCNC: 5 MMOL/L (ref 5–15)
AST SERPL-CCNC: 15 U/L (ref 15–37)
ATRIAL RATE: 56 BPM
BASOPHILS # BLD: 0 K/UL (ref 0–0.1)
BASOPHILS NFR BLD: 0 % (ref 0–1)
BILIRUB SERPL-MCNC: 0.8 MG/DL (ref 0.2–1)
BUN SERPL-MCNC: 19 MG/DL (ref 6–20)
BUN/CREAT SERPL: 23 (ref 12–20)
CALCIUM SERPL-MCNC: 8.3 MG/DL (ref 8.5–10.1)
CALCULATED P AXIS, ECG09: 75 DEGREES
CALCULATED R AXIS, ECG10: 47 DEGREES
CALCULATED T AXIS, ECG11: 47 DEGREES
CHLORIDE SERPL-SCNC: 106 MMOL/L (ref 97–108)
CO2 SERPL-SCNC: 28 MMOL/L (ref 21–32)
CREAT SERPL-MCNC: 0.84 MG/DL (ref 0.7–1.3)
DIAGNOSIS, 93000: NORMAL
DIFFERENTIAL METHOD BLD: NORMAL
EOSINOPHIL # BLD: 0.1 K/UL (ref 0–0.4)
EOSINOPHIL NFR BLD: 1 % (ref 0–7)
ERYTHROCYTE [DISTWIDTH] IN BLOOD BY AUTOMATED COUNT: 14.2 % (ref 11.5–14.5)
GLOBULIN SER CALC-MCNC: 3 G/DL (ref 2–4)
GLUCOSE SERPL-MCNC: 93 MG/DL (ref 65–100)
HCT VFR BLD AUTO: 40.7 % (ref 36.6–50.3)
HGB BLD-MCNC: 13.3 G/DL (ref 12.1–17)
IMM GRANULOCYTES # BLD AUTO: 0 K/UL (ref 0–0.04)
IMM GRANULOCYTES NFR BLD AUTO: 0 % (ref 0–0.5)
LYMPHOCYTES # BLD: 1.3 K/UL (ref 0.8–3.5)
LYMPHOCYTES NFR BLD: 29 % (ref 12–49)
MCH RBC QN AUTO: 28.1 PG (ref 26–34)
MCHC RBC AUTO-ENTMCNC: 32.7 G/DL (ref 30–36.5)
MCV RBC AUTO: 86 FL (ref 80–99)
MONOCYTES # BLD: 0.4 K/UL (ref 0–1)
MONOCYTES NFR BLD: 8 % (ref 5–13)
NEUTS SEG # BLD: 2.8 K/UL (ref 1.8–8)
NEUTS SEG NFR BLD: 62 % (ref 32–75)
NRBC # BLD: 0 K/UL (ref 0–0.01)
NRBC BLD-RTO: 0 PER 100 WBC
P-R INTERVAL, ECG05: 160 MS
PLATELET # BLD AUTO: 175 K/UL (ref 150–400)
PMV BLD AUTO: 9.9 FL (ref 8.9–12.9)
POTASSIUM SERPL-SCNC: 3.2 MMOL/L (ref 3.5–5.1)
PROT SERPL-MCNC: 7 G/DL (ref 6.4–8.2)
Q-T INTERVAL, ECG07: 390 MS
QRS DURATION, ECG06: 84 MS
QTC CALCULATION (BEZET), ECG08: 376 MS
RBC # BLD AUTO: 4.73 M/UL (ref 4.1–5.7)
SODIUM SERPL-SCNC: 139 MMOL/L (ref 136–145)
TROPONIN I SERPL-MCNC: <0.05 NG/ML
VENTRICULAR RATE, ECG03: 56 BPM
WBC # BLD AUTO: 4.6 K/UL (ref 4.1–11.1)

## 2019-10-02 PROCEDURE — 93005 ELECTROCARDIOGRAM TRACING: CPT

## 2019-10-02 PROCEDURE — 70450 CT HEAD/BRAIN W/O DYE: CPT

## 2019-10-02 PROCEDURE — 96375 TX/PRO/DX INJ NEW DRUG ADDON: CPT

## 2019-10-02 PROCEDURE — 74011250636 HC RX REV CODE- 250/636: Performed by: EMERGENCY MEDICINE

## 2019-10-02 PROCEDURE — 84484 ASSAY OF TROPONIN QUANT: CPT

## 2019-10-02 PROCEDURE — 96374 THER/PROPH/DIAG INJ IV PUSH: CPT

## 2019-10-02 PROCEDURE — 80053 COMPREHEN METABOLIC PANEL: CPT

## 2019-10-02 PROCEDURE — 99284 EMERGENCY DEPT VISIT MOD MDM: CPT

## 2019-10-02 PROCEDURE — 85025 COMPLETE CBC W/AUTO DIFF WBC: CPT

## 2019-10-02 PROCEDURE — 36415 COLL VENOUS BLD VENIPUNCTURE: CPT

## 2019-10-02 PROCEDURE — 96361 HYDRATE IV INFUSION ADD-ON: CPT

## 2019-10-02 RX ORDER — AZITHROMYCIN 250 MG/1
TABLET, FILM COATED ORAL
Qty: 6 TAB | Refills: 0 | Status: SHIPPED | OUTPATIENT
Start: 2019-10-02 | End: 2020-01-20 | Stop reason: ALTCHOICE

## 2019-10-02 RX ORDER — PREDNISONE 10 MG/1
TABLET ORAL
Qty: 48 TAB | Refills: 0 | OUTPATIENT
Start: 2019-10-02 | End: 2020-08-11

## 2019-10-02 RX ORDER — ONDANSETRON 4 MG/1
4 TABLET, ORALLY DISINTEGRATING ORAL
Qty: 10 TAB | Refills: 0 | Status: SHIPPED | OUTPATIENT
Start: 2019-10-02 | End: 2020-01-20 | Stop reason: ALTCHOICE

## 2019-10-02 RX ORDER — MECLIZINE HCL 12.5 MG 12.5 MG/1
25 TABLET ORAL
Status: COMPLETED | OUTPATIENT
Start: 2019-10-02 | End: 2019-10-02

## 2019-10-02 RX ORDER — MECLIZINE HYDROCHLORIDE 25 MG/1
25 TABLET ORAL
Qty: 20 TAB | Refills: 0 | OUTPATIENT
Start: 2019-10-02 | End: 2020-08-11

## 2019-10-02 RX ORDER — ONDANSETRON 2 MG/ML
4 INJECTION INTRAMUSCULAR; INTRAVENOUS
Status: COMPLETED | OUTPATIENT
Start: 2019-10-02 | End: 2019-10-02

## 2019-10-02 RX ORDER — KETOROLAC TROMETHAMINE 30 MG/ML
30 INJECTION, SOLUTION INTRAMUSCULAR; INTRAVENOUS
Status: COMPLETED | OUTPATIENT
Start: 2019-10-02 | End: 2019-10-02

## 2019-10-02 RX ADMIN — MECLIZINE 25 MG: 12.5 TABLET ORAL at 06:51

## 2019-10-02 RX ADMIN — SODIUM CHLORIDE 1000 ML: 900 INJECTION, SOLUTION INTRAVENOUS at 06:51

## 2019-10-02 RX ADMIN — ONDANSETRON 4 MG: 2 INJECTION INTRAMUSCULAR; INTRAVENOUS at 06:51

## 2019-10-02 RX ADMIN — KETOROLAC TROMETHAMINE 30 MG: 30 INJECTION, SOLUTION INTRAMUSCULAR at 06:51

## 2019-10-02 NOTE — ED NOTES
Report received from David, PAULINA. Elba QUIÑONES, ED Summary, MAR and Recent Results was discussed.     Marianna Teixeira RN

## 2019-10-02 NOTE — LETTER
[address] 
[facility] EM[facility]ENC[facility] DEPT 
[address] [facility] [place] [address] 
[phone] Work/School Note Date: 10/2/2019 To Whom It May concern: 
 
Harry Brightly was seen and treated today in the emergency room by the following provider(s): 
Attending Provider: Melisa Rojo Dr return to work on 10/3/2019.  
 
 
Sincerely, 
 
 
 
 
Dustin Duncan, DO
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
## 2019-10-02 NOTE — ED NOTES
Patient discharged by Dr. Ace Schuler. Patient provided with discharge instructions Rx and instructions on follow up care. Patient out of ED ambulatory accompanied by family. Chris Naranjo

## 2019-10-02 NOTE — DISCHARGE INSTRUCTIONS
Patient Education        Saline Nasal Washes: Care Instructions  Your Care Instructions  Saline nasal washes help keep the nasal passages open by washing out thick or dried mucus. This simple remedy can help relieve symptoms of allergies, sinusitis, and colds. It also can make the nose feel more comfortable by keeping the mucous membranes moist. You may notice a little burning sensation in your nose the first few times you use the solution, but this usually gets better in a few days. Follow-up care is a key part of your treatment and safety. Be sure to make and go to all appointments, and call your doctor if you are having problems. It's also a good idea to know your test results and keep a list of the medicines you take. How can you care for yourself at home? · You can buy premixed saline solution in a squeeze bottle or other sinus rinse products at a drugstore. Read and follow the instructions on the label. · You also can make your own saline solution by adding 1 teaspoon of salt and 1 teaspoon of baking soda to 2 cups of distilled water. · If you use a homemade solution, pour a small amount into a clean bowl. Using a rubber bulb syringe, squeeze the syringe and place the tip in the salt water. Pull a small amount of the salt water into the syringe by relaxing your hand. · Sit down with your head tilted slightly back. Do not lie down. Put the tip of the bulb syringe or the squeeze bottle a little way into one of your nostrils. Gently drip or squirt a few drops into the nostril. Repeat with the other nostril. Some sneezing and gagging are normal at first.  · Gently blow your nose. · Wipe the syringe or bottle tip clean after each use. · Repeat this 2 or 3 times a day. · Use nasal washes gently if you have nosebleeds often. When should you call for help?   Watch closely for changes in your health, and be sure to contact your doctor if:    · You often get nosebleeds.     · You have problems doing the nasal washes. Where can you learn more? Go to http://oskar-delma.info/. Enter [id number] in the search box to learn more about \"Saline Nasal Washes: Care Instructions. \"  Current as of: October 21, 2018  Content Version: 12.2  © 9448-9775 [employer]. Care instructions adapted under license by [employer] (which disclaims liability or warranty for this information). If you have questions about a medical condition or this instruction, always ask your healthcare professional. [place] [address] any warranty or liability for your use of this information. Patient Education        Epley Maneuver for Vertigo: Exercises  Your Care Instructions  The Epley Maneuver is a series of movements your doctor may use to treat your vertigo. Here are the steps for the exercises. Your doctor or physical therapist will guide you through the movements. A single 10- to 15-minute session often is all that's needed. Crystal debris (canaliths) cause the vertigo. When your head is moved into different positions, the debris moves freely. This may cause your symptoms to stop. How to do the exercises  Step 1    1. You will sit on the doctor's exam table. Your legs will be out in front of you. The doctor or physical therapist will turn your head so that it is USP between looking straight ahead and looking to the side that causes the worst vertigo. 2. Without changing your head position, he or she will guide you back quickly. Your shoulders will be on the table. Your head will hang over the edge of the table. At this point, the side of your head that is causing the worst vertigo will face the floor. You'll stay in this position for 30 seconds or until your symptoms stop. Step 2    1. Then, the doctor or physical therapist will turn your head to the other side. You don't need to lift your head. The other side of your head will face the floor.  You will stay in this position for 30 seconds or until your symptoms stop. Step 3    1. The doctor or physical therapist will help you roll your body in the same direction that your head is facing. You will lie on your side. (For example, if you are looking to your right, you will roll onto your right side.) The side that causes the worst symptoms should be facing up. You'll stay in this position for another 30 seconds or until your symptoms stop. Step 4    1. The doctor or physical therapist will then help you to sit back up. Your legs will hang off the table on the same side that you were facing. Follow-up care is a key part of your treatment and safety. Be sure to make and go to all appointments, and call your doctor if you are having problems. It's also a good idea to know your test results and keep a list of the medicines you take. Where can you learn more? Go to http://oskar-delma.info/. Enter G600 in the search box to learn more about \"Epley Maneuver for Vertigo: Exercises. \"  Current as of: October 21, 2018  Content Version: 12.2  © 5058-5401 [employer]. Care instructions adapted under license by [employer] (which disclaims liability or warranty for this information). If you have questions about a medical condition or this instruction, always ask your healthcare professional. [place] [address] any warranty or liability for your use of this information. Patient Education        Vertigo: Care Instructions  Your Care Instructions    Vertigo is the feeling that you or your surroundings are moving when there is no actual movement. It is often described as a feeling of spinning, whirling, falling, or tilting. Vertigo may make you vomit or feel nauseated. You may have trouble standing or walking and may lose your balance. Vertigo is often related to an inner ear problem, but it can have other more serious causes.  If vertigo continues, you may need more tests to find its cause.  Follow-up care is a key part of your treatment and safety. Be sure to make and go to all appointments, and call your doctor if you are having problems. It's also a good idea to know your test results and keep a list of the medicines you take. How can you care for yourself at home? · Do not lie flat on your back. Prop yourself up slightly. This may reduce the spinning feeling. Keep your eyes open. · Move slowly so that you do not fall. · If your doctor recommends medicine, take it exactly as directed. · Do not drive while you are having vertigo. Certain exercises, called Jade-Daroff exercises, can help decrease vertigo. To do Jade-Daroff exercises:  · Sit on the edge of a bed or sofa and quickly lie down on the side that causes the worst vertigo. Lie on your side with your ear down. · Stay in this position for at least 30 seconds or until the vertigo goes away. · Sit up. If this causes vertigo, wait for it to stop. · Repeat the procedure on the other side. · Repeat this 10 times. Do these exercises 2 times a day until the vertigo is gone. When should you call for help? Call 911 anytime you think you may need emergency care. For example, call if:    · You passed out (lost consciousness).     · You have symptoms of a stroke. These may include:  ? Sudden numbness, tingling, weakness, or loss of movement in your face, arm, or leg, especially on only one side of your body. ? Sudden vision changes. ? Sudden trouble speaking. ? Sudden confusion or trouble understanding simple statements. ? Sudden problems with walking or balance.   ? A sudden, severe headache that is different from past headaches.    Call your doctor now or seek immediate medical care if:    · Vertigo occurs with a fever, a headache, or ringing in your ears.     · You have new or increased nausea and vomiting.    Watch closely for changes in your health, and be sure to contact your doctor if:    · Vertigo gets worse or happens more often.     · Vertigo has not gotten better after 2 weeks. Where can you learn more? Go to http://oskar-delma.info/. Enter K660 in the search box to learn more about \"Vertigo: Care Instructions. \"  Current as of: October 21, 2018  Content Version: 12.2  © 4566-6659 [employer]. Care instructions adapted under license by [employer] (which disclaims liability or warranty for this information). If you have questions about a medical condition or this instruction, always ask your healthcare professional. [place] [address] any warranty or liability for your use of this information. Patient Education        Sinusitis: Care Instructions  Your Care Instructions    Sinusitis is an infection of the lining of the sinus cavities in your head. Sinusitis often follows a cold. It causes pain and pressure in your head and face. In most cases, sinusitis gets better on its own in 1 to 2 weeks. But some mild symptoms may last for several weeks. Sometimes antibiotics are needed. Follow-up care is a key part of your treatment and safety. Be sure to make and go to all appointments, and call your doctor if you are having problems. It's also a good idea to know your test results and keep a list of the medicines you take. How can you care for yourself at home? · Take an over-the-counter pain medicine, such as acetaminophen (Tylenol), ibuprofen (Advil, Motrin), or naproxen (Aleve). Read and follow all instructions on the label. · If the doctor prescribed antibiotics, take them as directed. Do not stop taking them just because you feel better. You need to take the full course of antibiotics. · Be careful when taking over-the-counter cold or flu medicines and Tylenol at the same time. Many of these medicines have acetaminophen, which is Tylenol. Read the labels to make sure that you are not taking more than the recommended dose.  Too much acetaminophen (Tylenol) can be harmful. · Breathe warm, moist air from a steamy shower, a hot bath, or a sink filled with hot water. Avoid cold, dry air. Using a humidifier in your home may help. Follow the directions for cleaning the machine. · Use saline (saltwater) nasal washes to help keep your nasal passages open and wash out mucus and bacteria. You can buy saline nose drops at a grocery store or drugstore. Or you can make your own at home by adding 1 teaspoon of salt and 1 teaspoon of baking soda to 2 cups of distilled water. If you make your own, fill a bulb syringe with the solution, insert the tip into your nostril, and squeeze gently. Stephen Saldañaer your nose. · Put a hot, wet towel or a warm gel pack on your face 3 or 4 times a day for 5 to 10 minutes each time. · Try a decongestant nasal spray like oxymetazoline (Afrin). Do not use it for more than 3 days in a row. Using it for more than 3 days can make your congestion worse. When should you call for help? Call your doctor now or seek immediate medical care if:    · You have new or worse swelling or redness in your face or around your eyes.     · You have a new or higher fever.    Watch closely for changes in your health, and be sure to contact your doctor if:    · You have new or worse facial pain.     · The mucus from your nose becomes thicker (like pus) or has new blood in it.     · You are not getting better as expected. Where can you learn more? Go to http://oskar-delma.info/. Enter H351 in the search box to learn more about \"Sinusitis: Care Instructions. \"  Current as of: October 21, 2018  Content Version: 12.2  © 9659-7612 [employer]. Care instructions adapted under license by [employer] (which disclaims liability or warranty for this information).  If you have questions about a medical condition or this instruction, always ask your healthcare professional. [address] any warranty or liability for your use of this information.

## 2020-01-20 ENCOUNTER — OFFICE VISIT (OUTPATIENT)
Dept: INTERNAL MEDICINE CLINIC | Age: 55
End: 2020-01-20

## 2020-01-20 VITALS
WEIGHT: 161 LBS | RESPIRATION RATE: 14 BRPM | SYSTOLIC BLOOD PRESSURE: 126 MMHG | BODY MASS INDEX: 26.82 KG/M2 | HEIGHT: 65 IN | DIASTOLIC BLOOD PRESSURE: 86 MMHG | OXYGEN SATURATION: 97 % | HEART RATE: 80 BPM | TEMPERATURE: 98 F

## 2020-01-20 DIAGNOSIS — M50.90 CERVICAL DISC DISEASE: ICD-10-CM

## 2020-01-20 DIAGNOSIS — M19.049 HAND ARTHRITIS: ICD-10-CM

## 2020-01-20 DIAGNOSIS — M12.812 ROTATOR CUFF ARTHROPATHY OF LEFT SHOULDER: ICD-10-CM

## 2020-01-20 DIAGNOSIS — I10 ESSENTIAL HYPERTENSION: Primary | ICD-10-CM

## 2020-01-20 RX ORDER — AMLODIPINE BESYLATE 2.5 MG/1
2.5 TABLET ORAL DAILY
Qty: 90 TAB | Refills: 3 | Status: SHIPPED | OUTPATIENT
Start: 2020-01-20 | End: 2020-01-20 | Stop reason: SDUPTHER

## 2020-01-20 RX ORDER — DICLOFENAC SODIUM 75 MG/1
75 TABLET, DELAYED RELEASE ORAL 2 TIMES DAILY
Qty: 60 TAB | Refills: 3 | Status: SHIPPED | OUTPATIENT
Start: 2020-01-20 | End: 2020-01-20 | Stop reason: SDUPTHER

## 2020-01-20 RX ORDER — AMLODIPINE BESYLATE 2.5 MG/1
2.5 TABLET ORAL DAILY
Qty: 90 TAB | Refills: 3 | Status: SHIPPED | OUTPATIENT
Start: 2020-01-20 | End: 2020-05-22 | Stop reason: SDUPTHER

## 2020-01-20 RX ORDER — DICLOFENAC SODIUM 75 MG/1
75 TABLET, DELAYED RELEASE ORAL 2 TIMES DAILY
Qty: 60 TAB | Refills: 3 | OUTPATIENT
Start: 2020-01-20 | End: 2020-08-11

## 2020-01-20 NOTE — PATIENT INSTRUCTIONS
Trovit Activation Thank you for requesting access to Trovit. Please follow the instructions below to securely access and download your online medical record. Trovit allows you to send messages to your doctor, view your test results, renew your prescriptions, schedule appointments, and more. How Do I Sign Up? 1. In your internet browser, go to [email or web address] 
2. Click on the First Time User? Click Here link in the Sign In box. You will be redirect to the New Member Sign Up page. 3. Enter your Trovit Access Code exactly as it appears below. You will not need to use this code after youve completed the sign-up process. If you do not sign up before the expiration date, you must request a new code. Trovit Access Code: [email or web address] Expires: 3/5/2020 12:17 PM (This is the date your Trovit access code will ) 4. Enter the last four digits of your Social Security Number (xxxx) and Date of Birth (mm/dd/yyyy) as indicated and click Submit. You will be taken to the next sign-up page. 5. Create a Trovit ID. This will be your Trovit login ID and cannot be changed, so think of one that is secure and easy to remember. 6. Create a Trovit password. You can change your password at any time. 7. Enter your Password Reset Question and Answer. This can be used at a later time if you forget your password. 8. Enter your e-mail address. You will receive e-mail notification when new information is available in [address]. 9. Click Sign Up. You can now view and download portions of your medical record. 10. Click the Download Summary menu link to download a portable copy of your medical information. Additional Information If you have questions, please visit the Frequently Asked Questions section of the Trovit website at [email or web address] Remember, Trovit is NOT to be used for urgent needs. For medical emergencies, dial 911.

## 2020-01-20 NOTE — PROGRESS NOTES
1. Have you been to the ER, urgent care clinic since your last visit? Hospitalized since your last visit?no    2. Have you seen or consulted any other health care providers outside of the [address] [place] [address] since your last visit? Include any pap smears or colon screening. No    3 most recent PHQ Screens 1/22/2019   Little interest or pleasure in doing things Not at all   Feeling down, depressed, irritable, or hopeless Not at all   Total Score PHQ 2 0     Chief Complaint   Patient presents with    Hypertension     Per Dr. Isa Finley.,  verbal order given for needed amb poc labs.
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
## 2020-01-21 LAB
ALBUMIN SERPL-MCNC: 4.9 G/DL (ref 3.8–4.9)
ALBUMIN/GLOB SERPL: 1.8 {RATIO} (ref 1.2–2.2)
ALP SERPL-CCNC: 85 IU/L (ref 39–117)
ALT SERPL-CCNC: 18 IU/L (ref 0–44)
AST SERPL-CCNC: 17 IU/L (ref 0–40)
BILIRUB SERPL-MCNC: 0.5 MG/DL (ref 0–1.2)
BUN SERPL-MCNC: 16 MG/DL (ref 6–24)
BUN/CREAT SERPL: 16 (ref 9–20)
CALCIUM SERPL-MCNC: 9.6 MG/DL (ref 8.7–10.2)
CHLORIDE SERPL-SCNC: 100 MMOL/L (ref 96–106)
CHOLEST SERPL-MCNC: 171 MG/DL (ref 100–199)
CO2 SERPL-SCNC: 23 MMOL/L (ref 20–29)
CREAT SERPL-MCNC: 1.03 MG/DL (ref 0.76–1.27)
ERYTHROCYTE [DISTWIDTH] IN BLOOD BY AUTOMATED COUNT: 13.8 % (ref 11.6–15.4)
GLOBULIN SER CALC-MCNC: 2.7 G/DL (ref 1.5–4.5)
GLUCOSE SERPL-MCNC: 96 MG/DL (ref 65–99)
HCT VFR BLD AUTO: 46.8 % (ref 37.5–51)
HDLC SERPL-MCNC: 45 MG/DL
HGB BLD-MCNC: 15.9 G/DL (ref 13–17.7)
INTERPRETATION, 910389: NORMAL
LDLC SERPL CALC-MCNC: 116 MG/DL (ref 0–99)
MCH RBC QN AUTO: 28.4 PG (ref 26.6–33)
MCHC RBC AUTO-ENTMCNC: 34 G/DL (ref 31.5–35.7)
MCV RBC AUTO: 84 FL (ref 79–97)
PLATELET # BLD AUTO: 247 X10E3/UL (ref 150–450)
POTASSIUM SERPL-SCNC: 4.3 MMOL/L (ref 3.5–5.2)
PROT SERPL-MCNC: 7.6 G/DL (ref 6–8.5)
RBC # BLD AUTO: 5.6 X10E6/UL (ref 4.14–5.8)
RHEUMATOID FACT SERPL-ACNC: <10 IU/ML (ref 0–13.9)
SODIUM SERPL-SCNC: 143 MMOL/L (ref 134–144)
TRIGL SERPL-MCNC: 52 MG/DL (ref 0–149)
VLDLC SERPL CALC-MCNC: 10 MG/DL (ref 5–40)
WBC # BLD AUTO: 4.6 X10E3/UL (ref 3.4–10.8)

## 2020-02-26 LAB — ERYTHROCYTE [SEDIMENTATION RATE] IN BLOOD BY WESTERGREN METHOD: 2 MM/HR (ref 0–30)

## 2020-03-31 ENCOUNTER — VIRTUAL VISIT (OUTPATIENT)
Dept: INTERNAL MEDICINE CLINIC | Age: 55
End: 2020-03-31

## 2020-03-31 DIAGNOSIS — M17.12 PRIMARY OSTEOARTHRITIS OF LEFT KNEE: ICD-10-CM

## 2020-03-31 DIAGNOSIS — I10 ESSENTIAL HYPERTENSION: ICD-10-CM

## 2020-03-31 DIAGNOSIS — M12.812 ROTATOR CUFF ARTHROPATHY OF LEFT SHOULDER: Primary | ICD-10-CM

## 2020-03-31 NOTE — PROGRESS NOTES
Chief Complaint   Patient presents with    Shoulder Pain     f/u     3 most recent PHQ Screens 1/22/2019   Little interest or pleasure in doing things Not at all   Feeling down, depressed, irritable, or hopeless Not at all   Total Score PHQ 2 0     1. Have you been to the ER, urgent care clinic since your last visit? Hospitalized since your last visit?no    2. Have you seen or consulted any other health care providers outside of the [address] [place] [address] since your last visit? Include any pap smears or colon screening.  no

## 2020-03-31 NOTE — PATIENT INSTRUCTIONS
Chamelic Activation Thank you for requesting access to Chamelic. Please follow the instructions below to securely access and download your online medical record. Chamelic allows you to send messages to your doctor, view your test results, renew your prescriptions, schedule appointments, and more. How Do I Sign Up? 1. In your internet browser, go to [email or web address] Trevor 
2. Click on the First Time User? Click Here link in the Sign In box. You will be redirect to the New Member Sign Up page. 3. Enter your Chamelic Access Code exactly as it appears below. You will not need to use this code after youve completed the sign-up process. If you do not sign up before the expiration date, you must request a new code. Chamelic Access Code: [email or web address] Expires: 2020  1:21 PM (This is the date your Chamelic access code will ) 4. Enter the last four digits of your Social Security Number (xxxx) and Date of Birth (mm/dd/yyyy) as indicated and click Submit. You will be taken to the next sign-up page. 5. Create a Chamelic ID. This will be your Chamelic login ID and cannot be changed, so think of one that is secure and easy to remember. 6. Create a Chamelic password. You can change your password at any time. 7. Enter your Password Reset Question and Answer. This can be used at a later time if you forget your password. 8. Enter your e-mail address. You will receive e-mail notification when new information is available in [address]. 9. Click Sign Up. You can now view and download portions of your medical record. 10. Click the Download Summary menu link to download a portable copy of your medical information. Additional Information If you have questions, please visit the Frequently Asked Questions section of the Chamelic website at [email or web address] Remember, Chamelic is NOT to be used for urgent needs. For medical emergencies, dial 911.

## 2020-03-31 NOTE — PROGRESS NOTES
1. Have you been to the ER, urgent care clinic since your last visit? Hospitalized since your last visit?no    2. Have you seen or consulted any other health care providers outside of the [address] [place] [address] since your last visit? Include any pap smears or colon screening.  No    3 most recent PHQ Screens 1/22/2019   Little interest or pleasure in doing things Not at all   Feeling down, depressed, irritable, or hopeless Not at all   Total Score PHQ 2 0
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
## 2020-05-22 ENCOUNTER — VIRTUAL VISIT (OUTPATIENT)
Dept: INTERNAL MEDICINE CLINIC | Age: 55
End: 2020-05-22

## 2020-05-22 DIAGNOSIS — M12.812 ROTATOR CUFF ARTHROPATHY OF LEFT SHOULDER: Primary | ICD-10-CM

## 2020-05-22 DIAGNOSIS — I10 ESSENTIAL HYPERTENSION: ICD-10-CM

## 2020-05-22 DIAGNOSIS — N40.0 BENIGN PROSTATIC HYPERPLASIA, UNSPECIFIED WHETHER LOWER URINARY TRACT SYMPTOMS PRESENT: ICD-10-CM

## 2020-05-22 RX ORDER — TAMSULOSIN HYDROCHLORIDE 0.4 MG/1
0.4 CAPSULE ORAL DAILY
Qty: 90 CAP | Refills: 3 | Status: SHIPPED | OUTPATIENT
Start: 2020-05-22 | End: 2021-10-29

## 2020-05-22 RX ORDER — AMLODIPINE BESYLATE 2.5 MG/1
2.5 TABLET ORAL DAILY
Qty: 90 TAB | Refills: 3 | Status: SHIPPED | OUTPATIENT
Start: 2020-05-22 | End: 2020-12-10 | Stop reason: SDUPTHER

## 2020-05-22 NOTE — PROGRESS NOTES
Chief Complaint   Patient presents with    Shoulder Pain    Hypertension     3 most recent PHQ Screens 1/22/2019   Little interest or pleasure in doing things Not at all   Feeling down, depressed, irritable, or hopeless Not at all   Total Score PHQ 2 0     1. Have you been to the ER, urgent care clinic since your last visit? Hospitalized since your last visit? NO    2. Have you seen or consulted any other health care providers outside of the [address] [place] [address] since your last visit? Include any pap smears or colon screening.  NO

## 2020-05-22 NOTE — PATIENT INSTRUCTIONS
GeeYee Activation Thank you for requesting access to GeeYee. Please follow the instructions below to securely access and download your online medical record. GeeYee allows you to send messages to your doctor, view your test results, renew your prescriptions, schedule appointments, and more. How Do I Sign Up? 1. In your internet browser, go to [email or web address] 
2. Click on the First Time User? Click Here link in the Sign In box. You will be redirect to the New Member Sign Up page. 3. Enter your GeeYee Access Code exactly as it appears below. You will not need to use this code after youve completed the sign-up process. If you do not sign up before the expiration date, you must request a new code. GeeYee Access Code: [email or web address] Expires: 2020  8:59 AM (This is the date your GeeYee access code will ) 4. Enter the last four digits of your Social Security Number (xxxx) and Date of Birth (mm/dd/yyyy) as indicated and click Submit. You will be taken to the next sign-up page. 5. Create a GeeYee ID. This will be your GeeYee login ID and cannot be changed, so think of one that is secure and easy to remember. 6. Create a GeeYee password. You can change your password at any time. 7. Enter your Password Reset Question and Answer. This can be used at a later time if you forget your password. 8. Enter your e-mail address. You will receive e-mail notification when new information is available in [address]. 9. Click Sign Up. You can now view and download portions of your medical record. 10. Click the Download Summary menu link to download a portable copy of your medical information. Additional Information If you have questions, please visit the Frequently Asked Questions section of the GeeYee website at [email or web address] Remember, GeeYee is NOT to be used for urgent needs. For medical emergencies, dial 911.
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
## 2020-06-02 ENCOUNTER — OFFICE VISIT (OUTPATIENT)
Dept: INTERNAL MEDICINE CLINIC | Age: 55
End: 2020-06-02

## 2020-06-02 VITALS
SYSTOLIC BLOOD PRESSURE: 110 MMHG | WEIGHT: 168 LBS | TEMPERATURE: 97.7 F | RESPIRATION RATE: 18 BRPM | BODY MASS INDEX: 27.99 KG/M2 | DIASTOLIC BLOOD PRESSURE: 70 MMHG | OXYGEN SATURATION: 98 % | HEART RATE: 78 BPM | HEIGHT: 65 IN

## 2020-06-02 DIAGNOSIS — I10 ESSENTIAL HYPERTENSION: ICD-10-CM

## 2020-06-02 DIAGNOSIS — M12.812 ROTATOR CUFF ARTHROPATHY OF LEFT SHOULDER: Primary | ICD-10-CM

## 2020-06-02 RX ORDER — LIDOCAINE HYDROCHLORIDE 20 MG/ML
2 INJECTION, SOLUTION EPIDURAL; INFILTRATION; INTRACAUDAL; PERINEURAL ONCE
Qty: 2 ML | Refills: 0
Start: 2020-06-02 | End: 2020-06-02

## 2020-06-02 RX ORDER — TRIAMCINOLONE ACETONIDE 40 MG/ML
40 INJECTION, SUSPENSION INTRA-ARTICULAR; INTRAMUSCULAR ONCE
Qty: 1 ML | Refills: 0
Start: 2020-06-02 | End: 2020-06-02

## 2020-06-02 NOTE — PROGRESS NOTES
Chief Complaint   Patient presents with    Shoulder Pain     left shoulder     3 most recent PHQ Screens 6/2/2020   Little interest or pleasure in doing things Not at all   Feeling down, depressed, irritable, or hopeless Not at all   Total Score PHQ 2 0     1. Have you been to the ER, urgent care clinic since your last visit? Hospitalized since your last visit?no    2. Have you seen or consulted any other health care providers outside of the [address] [place] [address] since your last visit? Include any pap smears or colon screening.  no

## 2020-06-02 NOTE — PATIENT INSTRUCTIONS
[facility] Activation Thank you for requesting access to [facility]. Please follow the instructions below to securely access and download your online medical record. [facility] allows you to send messages to your doctor, view your test results, renew your prescriptions, schedule appointments, and more. How Do I Sign Up? 1. In your internet browser, go to [email or web address] 
2. Click on the First Time User? Click Here link in the Sign In box. You will be redirect to the New Member Sign Up page. 3. Enter your [facility] Access Code exactly as it appears below. You will not need to use this code after youve completed the sign-up process. If you do not sign up before the expiration date, you must request a new code. [facility] Access Code: [email or web address] Expires: 2020 11:03 AM (This is the date your [facility] access code will ) 4. Enter the last four digits of your Social Security Number (xxxx) and Date of Birth (mm/dd/yyyy) as indicated and click Submit. You will be taken to the next sign-up page. 5. Create a [facility] ID. This will be your [facility] login ID and cannot be changed, so think of one that is secure and easy to remember. 6. Create a [facility] password. You can change your password at any time. 7. Enter your Password Reset Question and Answer. This can be used at a later time if you forget your password. 8. Enter your e-mail address. You will receive e-mail notification when new information is available in [address] 9. Click Sign Up. You can now view and download portions of your medical record. 10. Click the Download Summary menu link to download a portable copy of your medical information. Additional Information If you have questions, please visit the Frequently Asked Questions section of the [facility] website at [email or web address] Remember, [facility] is NOT to be used for urgent needs. For medical emergencies, dial 911.
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
## 2020-08-10 ENCOUNTER — HOSPITAL ENCOUNTER (OUTPATIENT)
Dept: GENERAL RADIOLOGY | Age: 55
Discharge: HOME | End: 2020-08-10
Payer: MEDICAID

## 2020-08-10 DIAGNOSIS — M12.812 ROTATOR CUFF ARTHROPATHY OF LEFT SHOULDER: ICD-10-CM

## 2020-08-10 PROCEDURE — 73030 X-RAY EXAM OF SHOULDER: CPT

## 2020-08-11 ENCOUNTER — HOSPITAL ENCOUNTER (EMERGENCY)
Age: 55
Discharge: HOME | End: 2020-08-11
Attending: EMERGENCY MEDICINE
Payer: MEDICAID

## 2020-08-11 VITALS
TEMPERATURE: 98.8 F | OXYGEN SATURATION: 100 % | DIASTOLIC BLOOD PRESSURE: 80 MMHG | HEART RATE: 60 BPM | RESPIRATION RATE: 18 BRPM | SYSTOLIC BLOOD PRESSURE: 120 MMHG | HEIGHT: 69 IN | BODY MASS INDEX: 24.56 KG/M2 | WEIGHT: 165.79 LBS

## 2020-08-11 DIAGNOSIS — Z91.14 HX OF MEDICATION NONCOMPLIANCE: ICD-10-CM

## 2020-08-11 DIAGNOSIS — I10 ESSENTIAL HYPERTENSION: Primary | ICD-10-CM

## 2020-08-11 LAB
ALBUMIN SERPL-MCNC: 4.4 G/DL (ref 3.5–5)
ALBUMIN/GLOB SERPL: 1.2 {RATIO} (ref 1.1–2.2)
ALP SERPL-CCNC: 77 U/L (ref 45–117)
ALT SERPL-CCNC: 18 U/L (ref 12–78)
ANION GAP SERPL CALC-SCNC: 2 MMOL/L (ref 5–15)
APPEARANCE UR: CLEAR
AST SERPL-CCNC: 9 U/L (ref 15–37)
BACTERIA URNS QL MICRO: NEGATIVE /HPF
BASOPHILS # BLD: 0 K/UL (ref 0–0.1)
BASOPHILS NFR BLD: 1 % (ref 0–1)
BILIRUB SERPL-MCNC: 1.3 MG/DL (ref 0.2–1)
BILIRUB UR QL: NEGATIVE
BUN SERPL-MCNC: 12 MG/DL (ref 6–20)
BUN/CREAT SERPL: 12 (ref 12–20)
CALCIUM SERPL-MCNC: 9.2 MG/DL (ref 8.5–10.1)
CHLORIDE SERPL-SCNC: 105 MMOL/L (ref 97–108)
CO2 SERPL-SCNC: 32 MMOL/L (ref 21–32)
COLOR UR: ABNORMAL
CREAT SERPL-MCNC: 1 MG/DL (ref 0.7–1.3)
DIFFERENTIAL METHOD BLD: ABNORMAL
EOSINOPHIL # BLD: 0.1 K/UL (ref 0–0.4)
EOSINOPHIL NFR BLD: 2 % (ref 0–7)
EPITH CASTS URNS QL MICRO: ABNORMAL /LPF
ERYTHROCYTE [DISTWIDTH] IN BLOOD BY AUTOMATED COUNT: 14.6 % (ref 11.5–14.5)
GLOBULIN SER CALC-MCNC: 3.7 G/DL (ref 2–4)
GLUCOSE SERPL-MCNC: 92 MG/DL (ref 65–100)
GLUCOSE UR STRIP.AUTO-MCNC: NEGATIVE MG/DL
HCT VFR BLD AUTO: 47.5 % (ref 36.6–50.3)
HGB BLD-MCNC: 15.2 G/DL (ref 12.1–17)
HGB UR QL STRIP: ABNORMAL
HYALINE CASTS URNS QL MICRO: ABNORMAL /LPF (ref 0–5)
IMM GRANULOCYTES # BLD AUTO: 0 K/UL (ref 0–0.04)
IMM GRANULOCYTES NFR BLD AUTO: 0 % (ref 0–0.5)
KETONES UR QL STRIP.AUTO: NEGATIVE MG/DL
LEUKOCYTE ESTERASE UR QL STRIP.AUTO: ABNORMAL
LYMPHOCYTES # BLD: 2 K/UL (ref 0.8–3.5)
LYMPHOCYTES NFR BLD: 41 % (ref 12–49)
MCH RBC QN AUTO: 27.8 PG (ref 26–34)
MCHC RBC AUTO-ENTMCNC: 32 G/DL (ref 30–36.5)
MCV RBC AUTO: 87 FL (ref 80–99)
MONOCYTES # BLD: 0.4 K/UL (ref 0–1)
MONOCYTES NFR BLD: 9 % (ref 5–13)
NEUTS SEG # BLD: 2.4 K/UL (ref 1.8–8)
NEUTS SEG NFR BLD: 47 % (ref 32–75)
NITRITE UR QL STRIP.AUTO: NEGATIVE
NRBC # BLD: 0 K/UL (ref 0–0.01)
NRBC BLD-RTO: 0 PER 100 WBC
PH UR STRIP: 7.5 [PH] (ref 5–8)
PLATELET # BLD AUTO: 203 K/UL (ref 150–400)
PMV BLD AUTO: 9.9 FL (ref 8.9–12.9)
POTASSIUM SERPL-SCNC: 3.8 MMOL/L (ref 3.5–5.1)
PROT SERPL-MCNC: 8.1 G/DL (ref 6.4–8.2)
PROT UR STRIP-MCNC: NEGATIVE MG/DL
RBC # BLD AUTO: 5.46 M/UL (ref 4.1–5.7)
RBC #/AREA URNS HPF: ABNORMAL /HPF (ref 0–5)
SODIUM SERPL-SCNC: 139 MMOL/L (ref 136–145)
SP GR UR REFRACTOMETRY: 1.01 (ref 1–1.03)
UA: UC IF INDICATED,UAUC: ABNORMAL
UROBILINOGEN UR QL STRIP.AUTO: 0.2 EU/DL (ref 0.2–1)
WBC # BLD AUTO: 5 K/UL (ref 4.1–11.1)
WBC URNS QL MICRO: ABNORMAL /HPF (ref 0–4)

## 2020-08-11 PROCEDURE — 36415 COLL VENOUS BLD VENIPUNCTURE: CPT

## 2020-08-11 PROCEDURE — 99284 EMERGENCY DEPT VISIT MOD MDM: CPT

## 2020-08-11 PROCEDURE — 81001 URINALYSIS AUTO W/SCOPE: CPT

## 2020-08-11 PROCEDURE — 80053 COMPREHEN METABOLIC PANEL: CPT

## 2020-08-11 PROCEDURE — 85025 COMPLETE CBC W/AUTO DIFF WBC: CPT

## 2020-08-11 RX ORDER — AMLODIPINE BESYLATE 2.5 MG/1
2.5 TABLET ORAL
Status: DISCONTINUED | OUTPATIENT
Start: 2020-08-11 | End: 2020-08-11

## 2020-08-11 RX ORDER — AMLODIPINE BESYLATE 2.5 MG/1
2.5 TABLET ORAL
Status: DISCONTINUED | OUTPATIENT
Start: 2020-08-11 | End: 2020-08-11 | Stop reason: HOSPADM

## 2020-08-11 NOTE — DISCHARGE INSTRUCTIONS
Take blood pressure medication daily as prescribed. Follow up with your primary care for recheck. Return to the Emergency Dept for any headache, confusion, nausea/vomiting, visual problems.
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
## 2020-09-01 ENCOUNTER — OFFICE VISIT (OUTPATIENT)
Dept: INTERNAL MEDICINE CLINIC | Age: 55
End: 2020-09-01
Payer: MEDICAID

## 2020-09-01 VITALS
SYSTOLIC BLOOD PRESSURE: 120 MMHG | OXYGEN SATURATION: 100 % | RESPIRATION RATE: 19 BRPM | HEART RATE: 88 BPM | TEMPERATURE: 98.3 F | WEIGHT: 164 LBS | DIASTOLIC BLOOD PRESSURE: 80 MMHG | HEIGHT: 69 IN | BODY MASS INDEX: 24.29 KG/M2

## 2020-09-01 DIAGNOSIS — I10 ESSENTIAL HYPERTENSION: ICD-10-CM

## 2020-09-01 DIAGNOSIS — M12.812 ROTATOR CUFF ARTHROPATHY OF LEFT SHOULDER: Primary | ICD-10-CM

## 2020-09-01 DIAGNOSIS — N52.1 ERECTILE DISORDER DUE TO MEDICAL CONDITION IN MALE: ICD-10-CM

## 2020-09-01 PROCEDURE — 99214 OFFICE O/P EST MOD 30 MIN: CPT | Performed by: INTERNAL MEDICINE

## 2020-09-01 RX ORDER — SILDENAFIL 100 MG/1
100 TABLET, FILM COATED ORAL AS NEEDED
Qty: 30 TAB | Refills: 12 | Status: SHIPPED | OUTPATIENT
Start: 2020-09-01 | End: 2021-08-03 | Stop reason: SDUPTHER
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
## 2020-09-01 NOTE — PATIENT INSTRUCTIONS
[facility] Activation Thank you for requesting access to [facility]. Please follow the instructions below to securely access and download your online medical record. [facility] allows you to send messages to your doctor, view your test results, renew your prescriptions, schedule appointments, and more. How Do I Sign Up? 1. In your internet browser, go to [email or web address] 
2. Click on the First Time User? Click Here link in the Sign In box. You will be redirect to the New Member Sign Up page. 3. Enter your [facility] [address] Access Code exactly as it appears below. You will not need to use this code after youve completed the sign-up process. If you do not sign up before the expiration date, you must request a new code. [facility] Access Code: [email or web address] Expires: 10/16/2020 11:26 AM (This is the date your [facility] access code will ) 4. Enter the last four digits of your Social Security Number (xxxx) and Date of Birth (mm/dd/yyyy) as indicated and click Submit. You will be taken to the next sign-up page. 5. Create a [facility] ID. This will be your [facility] login ID and cannot be changed, so think of one that is secure and easy to remember. 6. Create a [facility] password. You can change your password at any time. 7. Enter your Password Reset Question and Answer. This can be used at a later time if you forget your password. 8. Enter your e-mail address. You will receive e-mail notification when new information is available in [address] 9. Click Sign Up. You can now view and download portions of your medical record. 10. Click the Download Summary menu link to download a portable copy of your medical information. Additional Information If you have questions, please visit the Frequently Asked Questions section of the [facility] website at [email or web address] Remember, [facility] is NOT to be used for urgent needs. For medical emergencies, dial 911.

## 2020-09-01 NOTE — PROGRESS NOTES
Chief Complaint   Patient presents with   [facility] ED Follow-up     3 most recent PHQ Screens 6/2/2020   Little interest or pleasure in doing things Not at all   Feeling down, depressed, irritable, or hopeless Not at all   Total Score PHQ 2 0     1. Have you been to the ER, urgent care clinic since your last visit? Hospitalized since your last visit? yes    2. Have you seen or consulted any other health care providers outside of the [address] [place] [address] since your last visit? Include any pap smears or colon screening.  no

## 2020-12-01 ENCOUNTER — OFFICE VISIT (OUTPATIENT)
Dept: INTERNAL MEDICINE CLINIC | Age: 55
End: 2020-12-01
Payer: MEDICAID

## 2020-12-01 VITALS
DIASTOLIC BLOOD PRESSURE: 86 MMHG | HEIGHT: 69 IN | OXYGEN SATURATION: 98 % | WEIGHT: 167 LBS | TEMPERATURE: 98.1 F | HEART RATE: 67 BPM | BODY MASS INDEX: 24.73 KG/M2 | SYSTOLIC BLOOD PRESSURE: 140 MMHG | RESPIRATION RATE: 16 BRPM

## 2020-12-01 DIAGNOSIS — I10 ESSENTIAL HYPERTENSION: ICD-10-CM

## 2020-12-01 DIAGNOSIS — M12.812 ROTATOR CUFF ARTHROPATHY OF LEFT SHOULDER: Primary | ICD-10-CM

## 2020-12-01 DIAGNOSIS — N52.1 ERECTILE DISORDER DUE TO MEDICAL CONDITION IN MALE: ICD-10-CM

## 2020-12-01 LAB
CHOLEST SERPL-MCNC: 184 MG/DL
HDLC SERPL-MCNC: 37 MG/DL
LDL CHOLESTEROL POC: 128 MG/DL
NON-HDL CHOLESTEROL, 011976: 146
TCHOL/HDL RATIO (POC): 4.9
TRIGL SERPL-MCNC: 93 MG/DL

## 2020-12-01 PROCEDURE — 99214 OFFICE O/P EST MOD 30 MIN: CPT | Performed by: INTERNAL MEDICINE

## 2020-12-01 PROCEDURE — 80061 LIPID PANEL: CPT | Performed by: INTERNAL MEDICINE

## 2020-12-01 PROCEDURE — 20605 DRAIN/INJ JOINT/BURSA W/O US: CPT | Performed by: INTERNAL MEDICINE

## 2020-12-01 RX ORDER — TRIAMCINOLONE ACETONIDE 40 MG/ML
40 INJECTION, SUSPENSION INTRA-ARTICULAR; INTRAMUSCULAR ONCE
Qty: 1 ML | Refills: 0
Start: 2020-12-01 | End: 2020-12-01

## 2020-12-01 RX ORDER — LIDOCAINE HYDROCHLORIDE 20 MG/ML
1 INJECTION, SOLUTION EPIDURAL; INFILTRATION; INTRACAUDAL; PERINEURAL ONCE
Qty: 1 ML | Refills: 0
Start: 2020-12-01 | End: 2020-12-01

## 2020-12-01 RX ORDER — SILDENAFIL 100 MG/1
100 TABLET, FILM COATED ORAL AS NEEDED
Qty: 30 TAB | Refills: 3 | Status: SHIPPED | OUTPATIENT
Start: 2020-12-01 | End: 2021-08-02

## 2020-12-01 NOTE — PATIENT INSTRUCTIONS
[employer] Activation Thank you for requesting access to [employer]. Please follow the instructions below to securely access and download your online medical record. [employer] allows you to send messages to your doctor, view your test results, renew your prescriptions, schedule appointments, and more. How Do I Sign Up? 1. In your internet browser, go to [email or web address] 
2. Click on the First Time User? Click Here link in the Sign In box. You will be redirect to the New Member Sign Up page. 3. Enter your [employer] Access Code exactly as it appears below. You will not need to use this code after youve completed the sign-up process. If you do not sign up before the expiration date, you must request a new code. [employer] Access Code: [place] Expires: 1/15/2021 11:29 AM (This is the date your [employer] access code will ) 4. Enter the last four digits of your Social Security Number (xxxx) and Date of Birth (mm/dd/yyyy) as indicated and click Submit. You will be taken to the next sign-up page. 5. Create a [employer] ID. This will be your [employer] login ID and cannot be changed, so think of one that is secure and easy to remember. 6. Create a [employer] password. You can change your password at any time. 7. Enter your Password Reset Question and Answer. This can be used at a later time if you forget your password. 8. Enter your e-mail address. You will receive e-mail notification when new information is available in [address] 9. Click Sign Up. You can now view and download portions of your medical record. 10. Click the Download Summary menu link to download a portable copy of your medical information. Additional Information If you have questions, please visit the Frequently Asked Questions section of the [employer] website at [email or web address] Remember, [employer] is NOT to be used for urgent needs. For medical emergencies, dial 911.
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
## 2020-12-01 NOTE — PROGRESS NOTES
1. Have you been to the ER, urgent care clinic since your last visit? Hospitalized since your last visit?no    2. Have you seen or consulted any other health care providers outside of the [address] [place] [address] since your last visit? Include any pap smears or colon screening. No    3 most recent PHQ Screens 6/2/2020   Little interest or pleasure in doing things Not at all   Feeling down, depressed, irritable, or hopeless Not at all   Total Score PHQ 2 0     Chief Complaint   Patient presents with    Hypertension     Per Dr. Earl Mock.,  verbal order given for needed amb poc labs.

## 2020-12-10 RX ORDER — AMLODIPINE BESYLATE 2.5 MG/1
2.5 TABLET ORAL DAILY
Qty: 90 TAB | Refills: 1 | Status: SHIPPED | OUTPATIENT
Start: 2020-12-10 | End: 2021-03-01 | Stop reason: SDUPTHER

## 2020-12-10 NOTE — TELEPHONE ENCOUNTER
Callers first & last name:Judd Reagan   [phone]  Reason for call: refill   Medication name & Strength: Norvasc 2.5 mg   Pharmacy name:Charlie Woo/NP:DR. Kiser   Details to clarify request:  Patient is requesting a new prescription sent to [facility]. Previous prescription was sent to [facility] [address]. Last visit 12/01/2020 MD Pili Childers   Next appointment 03/01/2021 MD Pili Childers   Previous refill encounter(s)   05/22/2020 Norvasc #90 with 3 refills. Requested Prescriptions     Pending Prescriptions Disp Refills    amLODIPine (NORVASC) 2.5 mg tablet 90 Tab 1     Sig: Take 1 Tab by mouth daily.

## 2021-03-01 ENCOUNTER — OFFICE VISIT (OUTPATIENT)
Dept: INTERNAL MEDICINE CLINIC | Age: 56
End: 2021-03-01
Payer: MEDICAID

## 2021-03-01 VITALS
WEIGHT: 166 LBS | HEART RATE: 78 BPM | DIASTOLIC BLOOD PRESSURE: 70 MMHG | SYSTOLIC BLOOD PRESSURE: 140 MMHG | TEMPERATURE: 98 F | OXYGEN SATURATION: 98 % | HEIGHT: 69 IN | RESPIRATION RATE: 20 BRPM | BODY MASS INDEX: 24.59 KG/M2

## 2021-03-01 DIAGNOSIS — I10 ESSENTIAL HYPERTENSION: Primary | ICD-10-CM

## 2021-03-01 DIAGNOSIS — M12.812 ROTATOR CUFF ARTHROPATHY OF LEFT SHOULDER: ICD-10-CM

## 2021-03-01 LAB
ALBUMIN SERPL-MCNC: 4.6 G/DL (ref 3.5–5)
ALBUMIN/GLOB SERPL: 1.4 {RATIO} (ref 1.1–2.2)
ALP SERPL-CCNC: 83 U/L (ref 45–117)
ALT SERPL-CCNC: 23 U/L (ref 12–78)
ANION GAP SERPL CALC-SCNC: 8 MMOL/L (ref 5–15)
AST SERPL-CCNC: 12 U/L (ref 15–37)
BILIRUB SERPL-MCNC: 1.1 MG/DL (ref 0.2–1)
BUN SERPL-MCNC: 15 MG/DL (ref 6–20)
BUN/CREAT SERPL: 15 (ref 12–20)
CALCIUM SERPL-MCNC: 9.6 MG/DL (ref 8.5–10.1)
CHLORIDE SERPL-SCNC: 105 MMOL/L (ref 97–108)
CHOLEST SERPL-MCNC: 204 MG/DL
CO2 SERPL-SCNC: 30 MMOL/L (ref 21–32)
CREAT SERPL-MCNC: 1.01 MG/DL (ref 0.7–1.3)
ERYTHROCYTE [DISTWIDTH] IN BLOOD BY AUTOMATED COUNT: 13.6 % (ref 11.5–14.5)
GLOBULIN SER CALC-MCNC: 3.2 G/DL (ref 2–4)
GLUCOSE SERPL-MCNC: 98 MG/DL (ref 65–100)
HCT VFR BLD AUTO: 47 % (ref 36.6–50.3)
HDLC SERPL-MCNC: 48 MG/DL
HGB BLD-MCNC: 15.2 G/DL (ref 12.1–17)
LDL CHOLESTEROL POC: 132 MG/DL
MCH RBC QN AUTO: 28.1 PG (ref 26–34)
MCHC RBC AUTO-ENTMCNC: 32.3 G/DL (ref 30–36.5)
MCV RBC AUTO: 86.9 FL (ref 80–99)
NON-HDL GOAL (POC): 156
NRBC # BLD: 0 K/UL (ref 0–0.01)
NRBC BLD-RTO: 0 PER 100 WBC
PLATELET # BLD AUTO: 212 K/UL (ref 150–400)
PMV BLD AUTO: 10.1 FL (ref 8.9–12.9)
POTASSIUM SERPL-SCNC: 4.2 MMOL/L (ref 3.5–5.1)
PROT SERPL-MCNC: 7.8 G/DL (ref 6.4–8.2)
RBC # BLD AUTO: 5.41 M/UL (ref 4.1–5.7)
SODIUM SERPL-SCNC: 143 MMOL/L (ref 136–145)
TCHOL/HDL RATIO (POC): 4.3
TRIGL SERPL-MCNC: 119 MG/DL
WBC # BLD AUTO: 4.8 K/UL (ref 4.1–11.1)

## 2021-03-01 PROCEDURE — 80061 LIPID PANEL: CPT | Performed by: INTERNAL MEDICINE

## 2021-03-01 PROCEDURE — 20605 DRAIN/INJ JOINT/BURSA W/O US: CPT | Performed by: INTERNAL MEDICINE

## 2021-03-01 PROCEDURE — 99214 OFFICE O/P EST MOD 30 MIN: CPT | Performed by: INTERNAL MEDICINE

## 2021-03-01 RX ORDER — TRIAMCINOLONE ACETONIDE 40 MG/ML
40 INJECTION, SUSPENSION INTRA-ARTICULAR; INTRAMUSCULAR ONCE
Qty: 1 ML | Refills: 0
Start: 2021-03-01 | End: 2021-03-01

## 2021-03-01 RX ORDER — LIDOCAINE HYDROCHLORIDE 20 MG/ML
2 INJECTION, SOLUTION EPIDURAL; INFILTRATION; INTRACAUDAL; PERINEURAL ONCE
Qty: 2 ML | Refills: 0
Start: 2021-03-01 | End: 2021-03-01

## 2021-03-01 RX ORDER — AMLODIPINE BESYLATE 2.5 MG/1
2.5 TABLET ORAL DAILY
Qty: 90 TAB | Refills: 3 | Status: SHIPPED | OUTPATIENT
Start: 2021-03-01 | End: 2021-09-20 | Stop reason: SDUPTHER

## 2021-03-01 NOTE — PROGRESS NOTES
Chief Complaint   Patient presents with    Generalized Body Aches    Neuropathy     3 most recent PHQ Screens 3/1/2021   Little interest or pleasure in doing things Not at all   Feeling down, depressed, irritable, or hopeless Not at all   Total Score PHQ 2 0     1. Have you been to the ER, urgent care clinic since your last visit? Hospitalized since your last visit?no    2. Have you seen or consulted any other health care providers outside of the [address] [place] [address] since your last visit? Include any pap smears or colon screening.  no

## 2021-03-01 NOTE — PATIENT INSTRUCTIONS
Act-On Software Activation Thank you for requesting access to [employer]. Please follow the instructions below to securely access and download your online medical record. [employer] allows you to send messages to your doctor, view your test results, renew your prescriptions, schedule appointments, and more. How Do I Sign Up? 1. In your internet browser, go to [email or web address] 
2. Click on the First Time User? Click Here link in the Sign In box. You will be redirect to the New Member Sign Up page. 3. Enter your [employer] Software Access Code exactly as it appears below. You will not need to use this code after youve completed the sign-up process. If you do not sign up before the expiration date, you must request a new code. [employer] Access Code: [email or web address] Expires: 4/15/2021 11:17 AM (This is the date your [employer] access code will ) 4. Enter the last four digits of your Social Security Number (xxxx) and Date of Birth (mm/dd/yyyy) as indicated and click Submit. You will be taken to the next sign-up page. 5. Create a Act-On Software ID. This will be your [employer] Software login ID and cannot be changed, so think of one that is secure and easy to remember. 6. Create a [employer] password. You can change your password at any time. 7. Enter your Password Reset Question and Answer. This can be used at a later time if you forget your password. 8. Enter your e-mail address. You will receive e-mail notification when new information is available in [id number]. 9. Click Sign Up. You can now view and download portions of your medical record. 10. Click the Download Summary menu link to download a portable copy of your medical information. Additional Information If you have questions, please visit the Frequently Asked Questions section of the [employer] website at [email or web address] Remember, Act-On Software is NOT to be used for urgent needs. For medical emergencies, dial 911.
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
## 2021-06-17 ENCOUNTER — OFFICE VISIT (OUTPATIENT)
Dept: INTERNAL MEDICINE CLINIC | Age: 56
End: 2021-06-17
Payer: MEDICAID

## 2021-06-17 VITALS
OXYGEN SATURATION: 97 % | SYSTOLIC BLOOD PRESSURE: 120 MMHG | HEART RATE: 75 BPM | BODY MASS INDEX: 24.14 KG/M2 | RESPIRATION RATE: 19 BRPM | DIASTOLIC BLOOD PRESSURE: 70 MMHG | HEIGHT: 69 IN | WEIGHT: 163 LBS | TEMPERATURE: 97.9 F

## 2021-06-17 DIAGNOSIS — L30.9 ECZEMA, UNSPECIFIED TYPE: ICD-10-CM

## 2021-06-17 DIAGNOSIS — M12.812 ROTATOR CUFF ARTHROPATHY OF LEFT SHOULDER: ICD-10-CM

## 2021-06-17 DIAGNOSIS — M50.90 CERVICAL DISC DISEASE: ICD-10-CM

## 2021-06-17 DIAGNOSIS — I10 ESSENTIAL HYPERTENSION: Primary | ICD-10-CM

## 2021-06-17 LAB
ALBUMIN SERPL-MCNC: 4.3 G/DL (ref 3.5–5)
ALBUMIN/GLOB SERPL: 1.3 {RATIO} (ref 1.1–2.2)
ALP SERPL-CCNC: 89 U/L (ref 45–117)
ALT SERPL-CCNC: 19 U/L (ref 12–78)
ANION GAP SERPL CALC-SCNC: 5 MMOL/L (ref 5–15)
AST SERPL-CCNC: 14 U/L (ref 15–37)
BILIRUB SERPL-MCNC: 0.7 MG/DL (ref 0.2–1)
BUN SERPL-MCNC: 13 MG/DL (ref 6–20)
BUN/CREAT SERPL: 13 (ref 12–20)
CALCIUM SERPL-MCNC: 9.1 MG/DL (ref 8.5–10.1)
CHLORIDE SERPL-SCNC: 106 MMOL/L (ref 97–108)
CHOLEST SERPL-MCNC: 169 MG/DL
CO2 SERPL-SCNC: 31 MMOL/L (ref 21–32)
CREAT SERPL-MCNC: 1 MG/DL (ref 0.7–1.3)
ERYTHROCYTE [DISTWIDTH] IN BLOOD BY AUTOMATED COUNT: 14.1 % (ref 11.5–14.5)
GLOBULIN SER CALC-MCNC: 3.3 G/DL (ref 2–4)
GLUCOSE SERPL-MCNC: 95 MG/DL (ref 65–100)
HCT VFR BLD AUTO: 44.3 % (ref 36.6–50.3)
HDLC SERPL-MCNC: 34 MG/DL
HGB BLD-MCNC: 14.1 G/DL (ref 12.1–17)
LDL CHOLESTEROL POC: 121 MG/DL
MCH RBC QN AUTO: 28.1 PG (ref 26–34)
MCHC RBC AUTO-ENTMCNC: 31.8 G/DL (ref 30–36.5)
MCV RBC AUTO: 88.4 FL (ref 80–99)
NON-HDL GOAL (POC): 135
NRBC # BLD: 0 K/UL (ref 0–0.01)
NRBC BLD-RTO: 0 PER 100 WBC
PLATELET # BLD AUTO: 194 K/UL (ref 150–400)
PMV BLD AUTO: 10.6 FL (ref 8.9–12.9)
POTASSIUM SERPL-SCNC: 3.9 MMOL/L (ref 3.5–5.1)
PROT SERPL-MCNC: 7.6 G/DL (ref 6.4–8.2)
RBC # BLD AUTO: 5.01 M/UL (ref 4.1–5.7)
SODIUM SERPL-SCNC: 142 MMOL/L (ref 136–145)
TCHOL/HDL RATIO (POC): 4.9
TRIGL SERPL-MCNC: 70 MG/DL
WBC # BLD AUTO: 4.6 K/UL (ref 4.1–11.1)

## 2021-06-17 PROCEDURE — 99214 OFFICE O/P EST MOD 30 MIN: CPT | Performed by: INTERNAL MEDICINE

## 2021-06-17 PROCEDURE — 80061 LIPID PANEL: CPT | Performed by: INTERNAL MEDICINE

## 2021-06-17 RX ORDER — DICLOFENAC SODIUM 10 MG/G
GEL TOPICAL 4 TIMES DAILY
Qty: 100 G | Refills: 12 | Status: SHIPPED | OUTPATIENT
Start: 2021-06-17

## 2021-06-17 RX ORDER — CLOBETASOL PROPIONATE 0.5 MG/G
OINTMENT TOPICAL 2 TIMES DAILY
Qty: 30 G | Refills: 12 | Status: SHIPPED | OUTPATIENT
Start: 2021-06-17 | End: 2022-06-10 | Stop reason: SDUPTHER

## 2021-06-17 NOTE — PATIENT INSTRUCTIONS
[employer] Activation Thank you for requesting access to [employer]. Please follow the instructions below to securely access and download your online medical record. [employer] allows you to send messages to your doctor, view your test results, renew your prescriptions, schedule appointments, and more. How Do I Sign Up? 1. In your internet browser, go to [email or web address] 
2. Click on the First Time User? Click Here link in the Sign In box. You will be redirect to the New Member Sign Up page. 3. Enter your [employer] Access Code exactly as it appears below. You will not need to use this code after youve completed the sign-up process. If you do not sign up before the expiration date, you must request a new code. [employer] Access Code: [email or web address] Expires: 2021 11:20 AM (This is the date your [employer] access code will ) 4. Enter the last four digits of your Social Security Number (xxxx) and Date of Birth (mm/dd/yyyy) as indicated and click Submit. You will be taken to the next sign-up page. 5. Create a [employer] ID. This will be your [employer] login ID and cannot be changed, so think of one that is secure and easy to remember. 6. Create a [employer] password. You can change your password at any time. 7. Enter your Password Reset Question and Answer. This can be used at a later time if you forget your password. 8. Enter your e-mail address. You will receive e-mail notification when new information is available in [address] 9. Click Sign Up. You can now view and download portions of your medical record. 10. Click the Download Summary menu link to download a portable copy of your medical information. Additional Information If you have questions, please visit the Frequently Asked Questions section of the [email or web address] website at [email or web address] Remember, [email or web address] is NOT to be used for urgent needs. For medical emergencies, dial 911.
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
## 2021-06-17 NOTE — PROGRESS NOTES
Chief Complaint   Patient presents with    Shoulder Pain    Hypertension    Skin Problem     3 most recent PHQ Screens 6/17/2021   Little interest or pleasure in doing things Not at all   Feeling down, depressed, irritable, or hopeless Not at all   Total Score PHQ 2 0     1. Have you been to the ER, urgent care clinic since your last visit? Hospitalized since your last visit?no    2. Have you seen or consulted any other health care providers outside of the [address] [place] [address] since your last visit? Include any pap smears or colon screening.  no

## 2021-07-31 DIAGNOSIS — N52.1 ERECTILE DISORDER DUE TO MEDICAL CONDITION IN MALE: ICD-10-CM

## 2021-08-02 RX ORDER — SILDENAFIL 100 MG/1
TABLET, FILM COATED ORAL
Qty: 30 TABLET | Refills: 2 | Status: SHIPPED | OUTPATIENT
Start: 2021-08-02 | End: 2022-01-11

## 2021-08-03 ENCOUNTER — OFFICE VISIT (OUTPATIENT)
Dept: INTERNAL MEDICINE CLINIC | Age: 56
End: 2021-08-03
Payer: MEDICAID

## 2021-08-03 VITALS
HEART RATE: 71 BPM | OXYGEN SATURATION: 93 % | RESPIRATION RATE: 16 BRPM | TEMPERATURE: 98.2 F | HEIGHT: 69 IN | DIASTOLIC BLOOD PRESSURE: 88 MMHG | BODY MASS INDEX: 24.14 KG/M2 | SYSTOLIC BLOOD PRESSURE: 136 MMHG | WEIGHT: 163 LBS

## 2021-08-03 DIAGNOSIS — G56.90 NEUROPATHY OF HAND, UNSPECIFIED LATERALITY: ICD-10-CM

## 2021-08-03 DIAGNOSIS — M50.90 CERVICAL DISC DISEASE: ICD-10-CM

## 2021-08-03 DIAGNOSIS — M12.812 ROTATOR CUFF ARTHROPATHY OF LEFT SHOULDER: Primary | ICD-10-CM

## 2021-08-03 PROCEDURE — 99214 OFFICE O/P EST MOD 30 MIN: CPT | Performed by: INTERNAL MEDICINE

## 2021-08-03 RX ORDER — SILDENAFIL 100 MG/1
100 TABLET, FILM COATED ORAL AS NEEDED
Qty: 30 TABLET | Refills: 12 | Status: SHIPPED
Start: 2021-08-03 | End: 2021-10-29 | Stop reason: SDUPTHER

## 2021-08-03 NOTE — PROGRESS NOTES
1. Have you been to the ER, urgent care clinic since your last visit? Hospitalized since your last visit?no    2. Have you seen or consulted any other health care providers outside of the [address] [place] [address] since your last visit? Include any pap smears or colon screening.  No    Chief Complaint   Patient presents with    Neck Pain    Shoulder Pain
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
## 2021-08-03 NOTE — PATIENT INSTRUCTIONS
[employer] Activation    Thank you for requesting access to [employer]. Please follow the instructions below to securely access and download your online medical record. [employer] allows you to send messages to your doctor, view your test results, renew your prescriptions, schedule appointments, and more. How Do I Sign Up? 1. In your internet browser, go to [email or web address]  2. Click on the First Time User? Click Here link in the Sign In box. You will be redirect to the New Member Sign Up page. 3. Enter your Tokopedia Access Code exactly as it appears below. You will not need to use this code after youve completed the sign-up process. If you do not sign up before the expiration date, you must request a new code. Tokopedia Access Code: [email or web address]  Expires: 2021  9:32 AM (This is the date your Tokopedia access code will )    4. Enter the last four digits of your Social Security Number (xxxx) and Date of Birth (mm/dd/yyyy) as indicated and click Submit. You will be taken to the next sign-up page. 5. Create a Tokopedia ID. This will be your Tokopedia login ID and cannot be changed, so think of one that is secure and easy to remember. 6. Create a Tokopedia password. You can change your password at any time. 7. Enter your Password Reset Question and Answer. This can be used at a later time if you forget your password. 8. Enter your e-mail address. You will receive e-mail notification when new information is available in [address] 9. Click Sign Up. You can now view and download portions of your medical record. 10. Click the Download Summary menu link to download a portable copy of your medical information. Additional Information    If you have questions, please visit the Frequently Asked Questions section of the [employer]opedia website at [email or web address] Remember, [employer]opedia is NOT to be used for urgent needs. For medical emergencies, dial 911.

## 2021-08-23 ENCOUNTER — OFFICE VISIT (OUTPATIENT)
Dept: NEUROLOGY | Age: 56
End: 2021-08-23
Payer: MEDICAID

## 2021-08-23 DIAGNOSIS — M54.12 CERVICAL RADICULOPATHY: ICD-10-CM

## 2021-08-23 PROCEDURE — 95911 NRV CNDJ TEST 9-10 STUDIES: CPT | Performed by: PSYCHIATRY & NEUROLOGY

## 2021-08-23 PROCEDURE — 95886 MUSC TEST DONE W/N TEST COMP: CPT | Performed by: PSYCHIATRY & NEUROLOGY

## 2021-08-23 NOTE — PROCEDURES
ELECTRODIAGNOSTIC REPORT      Test Date:  2021    Patient: Noreen Morillo : 1965 Physician: Reynaldo Diver   ID#: [id number] SEX: Male Ref. Phys:      Patient History / Exam:  49year old male who presents with neck pain and paresthesia in the upper extremities and diminished . EMG & NCV Findings:  Evaluation of the left median motor and the right median motor nerves showed normal distal onset latency (L3.6, R4.4 ms), normal amplitude (L11.2, R10.7 mV), and normal conduction velocity (Elbow-Wrist, L56, R56 m/s). The left ulnar motor and the right ulnar motor nerves showed normal distal onset latency (L2.8, R2.2 ms), normal amplitude (L7.0, R9.4 mV), decreased conduction velocity (Wrist-Abd Dig Minimi, L29, R36 m/s), normal conduction velocity (B Elbow-Wrist, L57, R58 m/s), and normal conduction velocity (A Elbow-B Elbow, L56, R67 m/s). The left median sensory, the right median sensory, the left radial sensory, the right radial sensory, the left ulnar sensory, and the right ulnar sensory nerves showed normal distal peak latency (L3.2, R3.6, L2.4, R2.1, L2.8, R3.1 ms) and normal amplitude (L13.5, R13.8, L18.9, R29.7, L12.4, R12.1 µV). All left vs. right side differences were within normal limits. All examined muscles (as indicated in the following table) showed no evidence of electrical instability.         Impression  Normal study      ___________________________      Nerve Conduction Studies  Anti Sensory Summary Table     Stim Site NR Onset (ms) Peak (ms) O-P Amp (µV) Norm Peak (ms) Norm O-P Amp Site1 Site2 Dist (cm) Norm Jarad (m/s)   Left Median Anti Sensory (2nd Digit)  34.6°C   Wrist    2.4 3.2 13.5 <4 >11 Wrist 2nd Digit 14.0    Right Median Anti Sensory (2nd Digit)  34.6°C   Wrist    2.6 3.6 13.8 <4 >11 Wrist 2nd Digit 14.0    Left Radial Anti Sensory (Base 1st Digit)  34.6°C   Wrist    1.8 2.4 18.9 <2.9 >15 Wrist Base 1st Digit 10.0    Right Radial Anti Sensory (Base 1st Digit)  34.6°C   Wrist    1.5 2.1 29.7 <2.9 >15 Wrist Base 1st Digit 10.0    Left Ulnar Anti Sensory (5th Digit)  34.6°C   Wrist    2.2 2.8 12.4 <4.0 >10 Wrist 5th Digit 14.0    Right Ulnar Anti Sensory (5th Digit)  34.6°C   Wrist    2.1 3.1 12.1 <4.0 >10 Wrist 5th Digit 14.0      Motor Summary Table     Stim Site NR Onset (ms) Norm Onset (ms) O-P Amp (mV) Norm O-P Amp P-T Amp (mV) Site1 Site2 Dist (cm) Jarad (m/s)   Left Median Motor (Abd Poll Brev)  34.6°C   Wrist    3.6 <4.5 11.2 >4.1  Wrist Abd Poll Brev 8.0 22   Elbow    7.9  10.7   Elbow Wrist 24.0 56   Right Median Motor (Abd Poll Brev)  34.6°C   Wrist    4.4 <4.5 10.7 >4.1  Wrist Abd Poll Brev 8.0 18   Elbow    8.6  9.7   Elbow Wrist 23.5 56   Left Ulnar Motor (Abd Dig Minimi)  34.6°C   Wrist    2.8 <3.1 7.0 >7.0  Wrist Abd Dig Minimi 8.0 29   B Elbow    6.3  6.2   B Elbow Wrist 20.0 57   A Elbow    8.1  5.1   A Elbow B Elbow 10.0 56   Right Ulnar Motor (Abd Dig Minimi)  34.6°C   Wrist    2.2 <3.1 9.4 >7.0  Wrist Abd Dig Minimi 8.0 36   B Elbow    6.0  8.9   B Elbow Wrist 22.0 58   A Elbow    7.5  8.6   A Elbow B Elbow 10.0 67       EMG     Side Muscle Nerve Root Ins Act Fibs Psw Recrt Duration Amp Poly Comment   Left 1stDorInt Ulnar C8-T1 Nml Nml Nml Nml Nml Nml Nml    Left PronatorTeres Median C6-7 Nml Nml Nml Nml Nml Nml Nml    Left Biceps Musculocut C5-6 Nml Nml Nml Nml Nml Nml Nml    Left Deltoid Axillary C5-6 Nml Nml Nml Nml Nml Nml Nml    Left Abd Poll Brev Median C8-T1 Nml Nml Nml Nml Nml Nml Nml      Waveforms:

## 2021-09-20 ENCOUNTER — OFFICE VISIT (OUTPATIENT)
Dept: INTERNAL MEDICINE CLINIC | Age: 56
End: 2021-09-20
Payer: MEDICAID

## 2021-09-20 VITALS
BODY MASS INDEX: 24.14 KG/M2 | DIASTOLIC BLOOD PRESSURE: 80 MMHG | TEMPERATURE: 98 F | WEIGHT: 163 LBS | HEIGHT: 69 IN | HEART RATE: 89 BPM | SYSTOLIC BLOOD PRESSURE: 120 MMHG | OXYGEN SATURATION: 98 % | RESPIRATION RATE: 16 BRPM

## 2021-09-20 DIAGNOSIS — M50.90 CERVICAL DISC DISEASE: ICD-10-CM

## 2021-09-20 DIAGNOSIS — M12.812 ROTATOR CUFF ARTHROPATHY OF LEFT SHOULDER: Primary | ICD-10-CM

## 2021-09-20 LAB
ERYTHROCYTE [SEDIMENTATION RATE] IN BLOOD: 4 MM/HR (ref 0–20)
RHEUMATOID FACT SERPL-ACNC: <10 IU/ML

## 2021-09-20 PROCEDURE — 99214 OFFICE O/P EST MOD 30 MIN: CPT | Performed by: INTERNAL MEDICINE

## 2021-09-20 RX ORDER — AMLODIPINE BESYLATE 2.5 MG/1
2.5 TABLET ORAL DAILY
Qty: 90 TABLET | Refills: 3 | Status: SHIPPED | OUTPATIENT
Start: 2021-09-20 | End: 2022-06-10 | Stop reason: SDUPTHER

## 2021-09-20 NOTE — PATIENT INSTRUCTIONS
[employer] Activation    Thank you for requesting access to [employer]. Please follow the instructions below to securely access and download your online medical record. [employer] allows you to send messages to your doctor, view your test results, renew your prescriptions, schedule appointments, and more. How Do I Sign Up? 1. In your internet browser, go to [email or web address]  2. Click on the First Time User? Click Here link in the Sign In box. You will be redirect to the New Member Sign Up page. 3. Enter your [employer] Access Code exactly as it appears below. You will not need to use this code after youve completed the sign-up process. If you do not sign up before the expiration date, you must request a new code. [employer] Access Code: [email or web address]  Expires: 2021  1:21 PM (This is the date your [employer] access code will )    4. Enter the last four digits of your Social Security Number (xxxx) and Date of Birth (mm/dd/yyyy) as indicated and click Submit. You will be taken to the next sign-up page. 5. Create a [employer] ID. This will be your [employer] login ID and cannot be changed, so think of one that is secure and easy to remember. 6. Create a [employer] password. You can change your password at any time. 7. Enter your Password Reset Question and Answer. This can be used at a later time if you forget your password. 8. Enter your e-mail address. You will receive e-mail notification when new information is available in [address] 9. Click Sign Up. You can now view and download portions of your medical record. 10. Click the Download Summary menu link to download a portable copy of your medical information. Additional Information    If you have questions, please visit the Frequently Asked Questions section of the [employer] website at [email or web address] "[employer]"/[employer] Drafthouse Theaterhart/. Remember, [employer] is NOT to be used for urgent needs. For medical emergencies, dial 911.

## 2021-09-20 NOTE — PROGRESS NOTES
1. Have you been to the ER, urgent care clinic since your last visit? Hospitalized since your last visit?no    2. Have you seen or consulted any other health care providers outside of the [address] [place] [address] since your last visit? Include any pap smears or colon screening.  No    Chief Complaint   Patient presents with    Shoulder Pain    Neck Pain     3 most recent PHQ Screens 6/17/2021   Little interest or pleasure in doing things Not at all   Feeling down, depressed, irritable, or hopeless Not at all   Total Score PHQ 2 0
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
## 2021-09-21 LAB — CCP IGA+IGG SERPL IA-ACNC: 5 UNITS (ref 0–19)

## 2021-10-18 ENCOUNTER — OFFICE VISIT (OUTPATIENT)
Dept: INTERNAL MEDICINE CLINIC | Age: 56
End: 2021-10-18
Payer: MEDICAID

## 2021-10-18 VITALS
HEART RATE: 88 BPM | RESPIRATION RATE: 16 BRPM | OXYGEN SATURATION: 98 % | SYSTOLIC BLOOD PRESSURE: 132 MMHG | TEMPERATURE: 98 F | WEIGHT: 164 LBS | DIASTOLIC BLOOD PRESSURE: 84 MMHG | HEIGHT: 69 IN | BODY MASS INDEX: 24.29 KG/M2

## 2021-10-18 DIAGNOSIS — M50.90 CERVICAL DISC DISEASE: ICD-10-CM

## 2021-10-18 DIAGNOSIS — M75.42 ROTATOR CUFF IMPINGEMENT SYNDROME OF LEFT SHOULDER: Primary | ICD-10-CM

## 2021-10-18 PROCEDURE — 99213 OFFICE O/P EST LOW 20 MIN: CPT | Performed by: INTERNAL MEDICINE

## 2021-10-18 NOTE — PATIENT INSTRUCTIONS
[employer] Activation    Thank you for requesting access to [employer]. Please follow the instructions below to securely access and download your online medical record. [employer] allows you to send messages to your doctor, view your test results, renew your prescriptions, schedule appointments, and more. How Do I Sign Up? 1. In your internet browser, go to [email or web address]  2. Click on the First Time User? Click Here link in the Sign In box. You will be redirect to the New Member Sign Up page. 3. Enter your [employer] Access Code exactly as it appears below. You will not need to use this code after youve completed the sign-up process. If you do not sign up before the expiration date, you must request a new code. [employer] Access Code: [email or web address]  Expires: 2021  1:21 PM (This is the date your [employer] access code will )    4. Enter the last four digits of your Social Security Number (xxxx) and Date of Birth (mm/dd/yyyy) as indicated and click Submit. You will be taken to the next sign-up page. 5. Create a iBuildApp ID. This will be your [employer] login ID and cannot be changed, so think of one that is secure and easy to remember. 6. Create a [employer] password. You can change your password at any time. 7. Enter your Password Reset Question and Answer. This can be used at a later time if you forget your password. 8. Enter your e-mail address. You will receive e-mail notification when new information is available in [address]. 9. Click Sign Up. You can now view and download portions of your medical record. 10. Click the Download Summary menu link to download a portable copy of your medical information. Additional Information    If you have questions, please visit the Frequently Asked Questions section of the [employer] website at [email or web address] Remember, [employer] is NOT to be used for urgent needs. For medical emergencies, dial 911.

## 2021-10-18 NOTE — PROGRESS NOTES
1. Have you been to the ER, urgent care clinic since your last visit? Hospitalized since your last visit?no    2. Have you seen or consulted any other health care providers outside of the [address] [place] [address] since your last visit? Include any pap smears or colon screening.  No    Chief Complaint   Patient presents with    Results    Tingling     fingers
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
## 2021-10-29 ENCOUNTER — HOSPITAL ENCOUNTER (EMERGENCY)
Age: 56
Discharge: HOME | End: 2021-10-29
Attending: EMERGENCY MEDICINE
Payer: MEDICAID

## 2021-10-29 VITALS
OXYGEN SATURATION: 100 % | HEIGHT: 69 IN | RESPIRATION RATE: 18 BRPM | TEMPERATURE: 98 F | BODY MASS INDEX: 24.46 KG/M2 | HEART RATE: 67 BPM | SYSTOLIC BLOOD PRESSURE: 157 MMHG | DIASTOLIC BLOOD PRESSURE: 85 MMHG | WEIGHT: 165.12 LBS
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
## 2021-10-29 DIAGNOSIS — S13.4XXA WHIPLASH INJURY TO NECK, INITIAL ENCOUNTER: Primary | ICD-10-CM

## 2021-10-29 DIAGNOSIS — S39.012A STRAIN OF LUMBAR REGION, INITIAL ENCOUNTER: ICD-10-CM

## 2021-10-29 PROCEDURE — 99281 EMR DPT VST MAYX REQ PHY/QHP: CPT

## 2021-10-29 RX ORDER — NAPROXEN 500 MG/1
500 TABLET ORAL
Qty: 20 TABLET | Refills: 0 | Status: SHIPPED | OUTPATIENT
Start: 2021-10-29 | End: 2021-11-11 | Stop reason: SDUPTHER

## 2021-10-29 RX ORDER — TRAMADOL HYDROCHLORIDE 50 MG/1
50 TABLET ORAL
Qty: 10 TABLET | Refills: 0 | Status: SHIPPED | OUTPATIENT
Start: 2021-10-29 | End: 2021-11-01

## 2021-10-29 RX ORDER — METHOCARBAMOL 750 MG/1
750 TABLET, FILM COATED ORAL 4 TIMES DAILY
Qty: 20 TABLET | Refills: 0 | Status: SHIPPED | OUTPATIENT
Start: 2021-10-29 | End: 2021-11-11 | Stop reason: SDUPTHER
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
## 2021-10-29 NOTE — DISCHARGE INSTRUCTIONS
It was a pleasure taking care of you at [facility] Emergency Department today. We know that when you come to [facility], you are entrusting us with your health, comfort, and safety. Our physicians and nurses honor that trust, and we truly appreciate the opportunity to care for you and your loved ones. We also value your feedback. If you receive a survey about your Emergency Department experience today, please fill it out. We care about our patients' feedback, and we listen to what you have to say. Thank you!

## 2021-11-03 ENCOUNTER — HOSPITAL ENCOUNTER (OUTPATIENT)
Dept: MRI IMAGING | Age: 56
Discharge: HOME | End: 2021-11-03
Attending: INTERNAL MEDICINE

## 2021-11-03 DIAGNOSIS — M75.42 ROTATOR CUFF IMPINGEMENT SYNDROME OF LEFT SHOULDER: ICD-10-CM

## 2021-11-11 ENCOUNTER — OFFICE VISIT (OUTPATIENT)
Dept: INTERNAL MEDICINE CLINIC | Age: 56
End: 2021-11-11
Payer: MEDICAID

## 2021-11-11 VITALS
BODY MASS INDEX: 24.29 KG/M2 | WEIGHT: 164 LBS | OXYGEN SATURATION: 98 % | RESPIRATION RATE: 16 BRPM | HEART RATE: 85 BPM | SYSTOLIC BLOOD PRESSURE: 112 MMHG | HEIGHT: 69 IN | DIASTOLIC BLOOD PRESSURE: 76 MMHG | TEMPERATURE: 98 F

## 2021-11-11 DIAGNOSIS — S13.9XXA NECK SPRAIN, INITIAL ENCOUNTER: ICD-10-CM

## 2021-11-11 DIAGNOSIS — S33.5XXA LUMBAR SPRAIN, INITIAL ENCOUNTER: Primary | ICD-10-CM

## 2021-11-11 DIAGNOSIS — R51.9 GENERALIZED HEADACHES: ICD-10-CM

## 2021-11-11 DIAGNOSIS — S63.501A SPRAIN OF RIGHT WRIST, INITIAL ENCOUNTER: ICD-10-CM

## 2021-11-11 PROCEDURE — 99214 OFFICE O/P EST MOD 30 MIN: CPT | Performed by: INTERNAL MEDICINE

## 2021-11-11 RX ORDER — NAPROXEN 500 MG/1
500 TABLET ORAL
Qty: 60 TABLET | Refills: 3 | Status: SHIPPED | OUTPATIENT
Start: 2021-11-11

## 2021-11-11 RX ORDER — METHOCARBAMOL 750 MG/1
750 TABLET, FILM COATED ORAL 3 TIMES DAILY
Qty: 60 TABLET | Refills: 3 | Status: SHIPPED | OUTPATIENT
Start: 2021-11-11

## 2021-11-11 NOTE — PATIENT INSTRUCTIONS
Plumbee Activation    Thank you for requesting access to Plumbee. Please follow the instructions below to securely access and download your online medical record. Plumbee allows you to send messages to your doctor, view your test results, renew your prescriptions, schedule appointments, and more. How Do I Sign Up? 1. In your internet browser, go to [email or web address] On The Go  2. Click on the First Time User? Click Here link in the Sign In box. You will be redirect to the New Member Sign Up page. 3. Enter your Plumbee Access Code exactly as it appears below. You will not need to use this code after youve completed the sign-up process. If you do not sign up before the expiration date, you must request a new code. Plumbee Access Code: [email or web address]  Expires: 2021  3:15 PM (This is the date your Plumbee access code will )    4. Enter the last four digits of your Social Security Number (xxxx) and Date of Birth (mm/dd/yyyy) as indicated and click Submit. You will be taken to the next sign-up page. 5. Create a Plumbee ID. This will be your Plumbee login ID and cannot be changed, so think of one that is secure and easy to remember. 6. Create a Plumbee password. You can change your password at any time. 7. Enter your Password Reset Question and Answer. This can be used at a later time if you forget your password. 8. Enter your e-mail address. You will receive e-mail notification when new information is available in [address] 9. Click Sign Up. You can now view and download portions of your medical record. 10. Click the Download Summary menu link to download a portable copy of your medical information. Additional Information    If you have questions, please visit the Frequently Asked Questions section of the Plumbee website at [email or web address] Remember, Plumbee is NOT to be used for urgent needs. For medical emergencies, dial 911.
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
## 2021-11-19 ENCOUNTER — HOSPITAL ENCOUNTER (OUTPATIENT)
Dept: PHYSICAL THERAPY | Age: 56
Discharge: HOME | End: 2021-11-19
Payer: MEDICAID

## 2021-11-19 PROCEDURE — 97140 MANUAL THERAPY 1/> REGIONS: CPT | Performed by: PHYSICAL THERAPIST

## 2021-11-19 PROCEDURE — 97110 THERAPEUTIC EXERCISES: CPT | Performed by: PHYSICAL THERAPIST

## 2021-11-19 PROCEDURE — 97161 PT EVAL LOW COMPLEX 20 MIN: CPT | Performed by: PHYSICAL THERAPIST
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
## 2021-12-02 ENCOUNTER — HOSPITAL ENCOUNTER (OUTPATIENT)
Dept: PHYSICAL THERAPY | Age: 56
Discharge: HOME | End: 2021-12-02
Payer: MEDICAID

## 2021-12-02 PROCEDURE — 97110 THERAPEUTIC EXERCISES: CPT | Performed by: PHYSICAL THERAPIST

## 2021-12-02 PROCEDURE — 97140 MANUAL THERAPY 1/> REGIONS: CPT | Performed by: PHYSICAL THERAPIST

## 2021-12-02 NOTE — PROGRESS NOTES
[facility]  [address], [address] [facility]    OUTPATIENT PHYSICAL THERAPY DAILY TREATMENT NOTE  VISIT: 2    NAME: Lynn Thompson AGE: 49 y.o. GENDER: male  DATE: 12/2/2021  REFERRING PHYSICIAN: Romi Nguyen MD    GOALS  Short term goals  Time frame: 4 weeks  1. Patient will be compliant and independent with the initial HEP as evidenced by being able to perform without cuing. 2. Patient will report a 50% improvement in symptoms. 3. Patient report a 50% improvement in sleeping. 4. Patient will have a 15 degree increase in cervical flexion ROM to allow decreased pain while painting. 5. Patient will have an increased tolerance for sitting to allow 45 minutes of the activity before symptoms start. 6. Patient will tolerate 45 minutes of clinic activities before onset of symptoms.      Long term goals  Time frame: 8 weeks  1. Patient will report pain level decrease to 0/10 to allow increased ease of movement. 2. Patient will have an improved score on the NDI outcome measure by 10 points to demonstrate an increase in functional activity tolerance. 3. Patient will be independent in final individualized HEP. 4. Patient will have an increase in lumbar extension ROM to 20 to allow performance of wlaking tasks. 5. Patient will have an increase in shoulder flexion strength to 5/5 to allow performance of lifting tasks. 6. 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 7. Patient will sleep 6-8 hours without being interrupted by pain. SUBJECTIVE:   \"It cramps I can get it to go away. \"    Pain In: 8/10    OBJECTIVE DATA SUMMARY:   EXAMINATION/PRESENTATION/DECISION MAKING:   Pain:  Location: Neck, BL shoulder, headache, and low lumbar region  Quality: dull and stiff, \"cloudy\"  Now: 9/10  Best: 9/10  Worst: 9/10  Easing Factors: medication, reclined lying  Aggravating Factors: walking, sitting, lying down, lifting     Strength: LEFT                  RIGHT  Shoulder flexion              4/5 p!                  4/5 p! Shoulder abduction          4/5                     4/5                       Shoulder ER                   5/5                     5/5  Shoulder IR                    5/5                     5/5     Hip flexion                      5/5                     4/5 p! Hip abduction                 5/5                     5/5  Hip extension                  5/5                     5/5  Knee flexion                    5/5                     5/5  Knee extension               5/5                     5/5             Range of Motion:   UE and LE ROM WNL         Spinal Assessment:   Cervical Spine (AROM)  (*Measured with single inclinometer)  Flexion  *            20 \"stiff\"  Extension*         25 \"pressure\"  R side bend*      30 suboccipital p! L side bend*      25 L neck p!  R rotation           50 \"pressure\"  L rotation           50 L neck p!      Lumbar Spine (AROM)  (*Measured with single inclinometer)  Flexion*             50 p! Extension*         10 p! R side bend*      15  L side bend*      15  R rotation           45  L rotation           45       Joint Mobility:   C1-5 hypomobile and painful with CPA and UPA assessment  L3-5 hypomobile with local pain based on PA assessment     Palpation:   Headache reproduction with palpation of suboccipital mm and L SCM  Local pain reproduction with palpation of BL UT and lumbar paraspinal mm     Neurologic Assessment:  Sensation: N/T reported in finger tips.  Light touch intact     Special Tests:   Cervical distraction (+)     Functional Measure:   RADHA: 25/50, 50% impaired  NDI: 31/50, 62% impaired       TREATMENT/INTERVENTION:  --Interventions in BOLD performed today--     Modalities (Rationale): None today     Therapeutic Exercises: to develop strength, endurance, range of motion, and flexibility  Supine:  Chin tuck 10x 3\" with tactile cues for decreased SCM activation  Suboccipital release 3'  LTR 10x each side 3\" hold  SKTC 5x each 3\" hold     Sidelying:  Open books 10x each    Seated:  UT stretch 5x 10\"  LS stretch 5x 10\" hold     Manual Therapy: for joint mobilization/manipulations and soft tissue mobilization  STM to L periscapular mm, L LS, and L UT in R sidelying  Suboccipital release  Cervical distraction     Neuro Re-Education: to improve movement, balance, coordination, kinesthetic sense, posture, and proprioception for sitting or standing balance  None today     Therapeutic Activities: to improve functional performance, power, or agility  None today     Ambulation/Gait Training:  None today    Activity tolerance and post treatment pain report:   good    Pain Out: 4/10    Education:  Education was provided to the patient on the following topics: HEP performance. Relaxation techniques. Posture. []    No changes were made to the home exercise program.  [x]    The following changes were made to the home exercise program:     Access Code: [email or web address]  URL: [email or web address]  Date: 12/02/2021  Prepared by: Esau Foot    Exercises  Supine Lower Trunk Rotation - 2 x daily - 10 reps  Hooklying Single Knee to Chest Stretch - 2 x daily - 10 reps  Sidelying Open Book Thoracic Lumbar Rotation and Extension - 2 x daily - 10 reps  Seated Cervical Sidebending Stretch - 2 x daily - 5 reps - 10\" hold  Seated Levator Scapulae Stretch - 2 x daily - 5 reps - 10\" hold  Standing Scapular Retraction - 2 x daily - 2 sets - 10 reps    Patient verbalized understanding of the topics presented. ASSESSMENT:   Patient showed good response to treatment with decrease in pain and improved L shoulder mobility. Patient continue to show AROM, soft tissue mobility, and strength impairments that are contributing to functional limitations in all ADLs and recreational activities including painting and caring for children.  Patient will continue to benefit from skilled Physical therapy intervention to address listed impairments to allow patient to reach his functional goals. Patients progression toward goals is as follows:  [x]     Improving appropriately and progressing toward goals  []     Improving slowly and progressing toward goals  []     Not making progress toward goals and plan of care will be adjusted    PLAN OF CARE:   Patient continues to benefit from skilled intervention to address the above impairments. [x]    Continue treatment per established plan of care.   []     Recommend the following changes to the plan of care:     Recommendations/Intent for next treatment: Add scapular stabilization exercises    Poppy Phan, PT   Time Calculation: 32 mins  Patient Time in clinic:   Start Time: 1032   Stop Time: 1104

## 2021-12-03 NOTE — ANESTHESIA POSTPROCEDURE EVALUATION
Procedure(s):  COLONOSCOPY. general, total IV anesthesia    Anesthesia Post Evaluation        Patient location during evaluation: PACU  Note status: Adequate. Level of consciousness: responsive to verbal stimuli and sleepy but conscious  Pain management: satisfactory to patient  Airway patency: patent  Anesthetic complications: no  Cardiovascular status: acceptable  Respiratory status: acceptable  Hydration status: acceptable  Comments: +Post-Anesthesia Evaluation and Assessment    Patient: Jerrell Dc MRN: [id number]  SSN: [id number]   YOB: 1965  Age: 48 y.o. Sex: male      Cardiovascular Function/Vital Signs    /85   Pulse (!) 59   Temp 36.7 °C (98 °F)   Resp 16   Ht 5' 9\" (1.753 m)   Wt 71.2 kg (157 lb)   SpO2 98%   BMI 23.18 kg/m²     Patient is status post Procedure(s):  COLONOSCOPY. Nausea/Vomiting: Controlled. Postoperative hydration reviewed and adequate. Pain:  Pain Scale 1: Numeric (0 - 10) (09/13/19 1152)  Pain Intensity 1: 0 (09/13/19 1152)   Managed. Neurological Status: At baseline. Mental Status and Level of Consciousness: Arousable. Pulmonary Status:   O2 Device: Room air (09/13/19 1152)   Adequate oxygenation and airway patent. Complications related to anesthesia: None    Post-anesthesia assessment completed. No concerns. Signed By: Ernesto Black MD    9/13/2019  Post anesthesia nausea and vomiting:  controlled      Vitals Value Taken Time   /85 9/13/2019 12:13 PM   Temp     Pulse 49 9/13/2019 12:13 PM   Resp 10 9/13/2019 12:13 PM   SpO2 100 % 9/13/2019 12:13 PM   Vitals shown include unvalidated device data.
No

## 2021-12-08 ENCOUNTER — HOSPITAL ENCOUNTER (OUTPATIENT)
Dept: PHYSICAL THERAPY | Age: 56
Discharge: HOME | End: 2021-12-08
Payer: MEDICAID

## 2021-12-08 PROCEDURE — 97110 THERAPEUTIC EXERCISES: CPT | Performed by: PHYSICAL THERAPIST

## 2021-12-08 NOTE — PROGRESS NOTES
[address]    OUTPATIENT PHYSICAL THERAPY DAILY TREATMENT NOTE  VISIT: 3    NAME: Raleigh Jacobson AGE: 49 y.o. GENDER: male  DATE: 12/8/2021  REFERRING PHYSICIAN: Meera Alexandre MD    GOALS  Short term goals  Time frame: 4 weeks  1. Patient will be compliant and independent with the initial HEP as evidenced by being able to perform without cuing. 2. Patient will report a 50% improvement in symptoms. 3. Patient report a 50% improvement in sleeping. 4. Patient will have a 15 degree increase in cervical flexion ROM to allow decreased pain while painting. 5. Patient will have an increased tolerance for sitting to allow 45 minutes of the activity before symptoms start. 6. Patient will tolerate 45 minutes of clinic activities before onset of symptoms.      Long term goals  Time frame: 8 weeks  1. Patient will report pain level decrease to 0/10 to allow increased ease of movement. 2. Patient will have an improved score on the NDI outcome measure by 10 points to demonstrate an increase in functional activity tolerance. 3. Patient will be independent in final individualized HEP. 4. Patient will have an increase in lumbar extension ROM to 20 to allow performance of wlaking tasks. 5. Patient will have an increase in shoulder flexion strength to 5/5 to allow performance of lifting tasks. 6. 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 7. Patient will sleep 6-8 hours without being interrupted by pain. SUBJECTIVE:   \"Everything still hurts.  My neck is still the worst.\"    Pain In: 7/10    OBJECTIVE DATA SUMMARY:   EXAMINATION/PRESENTATION/DECISION MAKING:   Pain:  Location: Neck, BL shoulder, headache, and low lumbar region  Quality: dull and stiff, \"cloudy\"  Now: 9/10  Best: 9/10  Worst: 9/10  Easing Factors: medication, reclined lying  Aggravating Factors: walking, sitting, lying down, lifting     Strength: LEFT                  RIGHT  Shoulder flexion              4/5 p!                  4/5 p! Shoulder abduction          4/5                     4/5                       Shoulder ER                   5/5                     5/5  Shoulder IR                    5/5                     5/5     Hip flexion                      5/5                     4/5 p! Hip abduction                 5/5                     5/5  Hip extension                  5/5                     5/5  Knee flexion                    5/5                     5/5  Knee extension               5/5                     5/5             Range of Motion:   UE and LE ROM WNL         Spinal Assessment:   Cervical Spine (AROM)  (*Measured with single inclinometer)  Flexion  *            20 \"stiff\"  Extension*         25 \"pressure\"  R side bend*      30 suboccipital p! L side bend*      25 L neck p!  R rotation           50 \"pressure\"  L rotation           50 L neck p!      Lumbar Spine (AROM)  (*Measured with single inclinometer)  Flexion*             50 p! Extension*         10 p! R side bend*      15  L side bend*      15  R rotation           45  L rotation           45       Joint Mobility:   C1-5 hypomobile and painful with CPA and UPA assessment  L3-5 hypomobile with local pain based on PA assessment     Palpation:   Headache reproduction with palpation of suboccipital mm and L SCM  Local pain reproduction with palpation of BL UT and lumbar paraspinal mm     Neurologic Assessment:  Sensation: N/T reported in finger tips.  Light touch intact     Special Tests:   Cervical distraction (+)     Functional Measure:   RADHA: 25/50, 50% impaired  NDI: 31/50, 62% impaired       TREATMENT/INTERVENTION:  --Interventions in BOLD performed today--   Modalities (Rationale): None today     Therapeutic Exercises: to develop strength, endurance, range of motion, and flexibility  Supine:  Chin tuck 10x 3\" with tactile cues for decreased SCM activation  Suboccipital release 3'  LTR 10x each side 3\" hold  SKTC 5x each 3\" hold     Sidelying:  Open books 10x each    Seated:  UT stretch 5x 10\" hold  LS stretch 5x 10\" hold    Standing:  Foam roller Tsp extension 3min  Self periscapular massage with lacrosse ball 3min  Rows at quantum 20# 3x 10     Manual Therapy: for joint mobilization/manipulations and soft tissue mobilization  STM to L periscapular mm, L LS, and L UT in R sidelying  Suboccipital release  Cervical distraction     Neuro Re-Education: to improve movement, balance, coordination, kinesthetic sense, posture, and proprioception for sitting or standing balance  None today     Therapeutic Activities: to improve functional performance, power, or agility  None today     Ambulation/Gait Training:  None today    Activity tolerance and post treatment pain report:   good    Pain Out: 4/10    Education:  Education was provided to the patient on the following topics: HEP performance. Relaxation techniques. Posture. []    No changes were made to the home exercise program.  [x]    The following changes were made to the home exercise program:     Access Code: [email or web address]  URL: [email or web address]  Date: 12/02/2021  Prepared by: Kriss Gonzalez    Exercises  Supine Lower Trunk Rotation - 2 x daily - 10 reps  Hooklying Single Knee to Chest Stretch - 2 x daily - 10 reps  Sidelying Open Book Thoracic Lumbar Rotation and Extension - 2 x daily - 10 reps  Seated Cervical Sidebending Stretch - 2 x daily - 5 reps - 10\" hold  Seated Levator Scapulae Stretch - 2 x daily - 5 reps - 10\" hold  Standing Scapular Retraction - 2 x daily - 2 sets - 10 reps    Patient verbalized understanding of the topics presented. ASSESSMENT:   Patient with latent pain following manual intervention last visit. Manual intervention held today and patient showed good response to added mobility intervention.  Patient continues to show lumbar AROM, periscapular soft tissue mobility, and strength impairments that are contributing to functional limitations in all ADLs and recreational activities including painting and caring for his children. Patient will continue to benefit from skilled Physical therapy intervention to address listed impairments to allow patient to reach his functional goals. Patients progression toward goals is as follows:  [x]     Improving appropriately and progressing toward goals  []     Improving slowly and progressing toward goals  []     Not making progress toward goals and plan of care will be adjusted    PLAN OF CARE:   Patient continues to benefit from skilled intervention to address the above impairments. [x]    Continue treatment per established plan of care.   []     Recommend the following changes to the plan of care:     Recommendations/Intent for next treatment: Add scapular stabilization exercises progress mobility intervention as tolerable     Wendie Julio, PT   Time Calculation: 32 mins  Patient Time in clinic:   Start Time: 3658   Stop Time: 1000

## 2021-12-14 ENCOUNTER — OFFICE VISIT (OUTPATIENT)
Dept: INTERNAL MEDICINE CLINIC | Age: 56
End: 2021-12-14
Payer: MEDICAID

## 2021-12-14 VITALS
BODY MASS INDEX: 24.44 KG/M2 | OXYGEN SATURATION: 98 % | TEMPERATURE: 98 F | WEIGHT: 165 LBS | HEIGHT: 69 IN | RESPIRATION RATE: 16 BRPM | HEART RATE: 98 BPM

## 2021-12-14 DIAGNOSIS — S63.501D SPRAIN OF RIGHT WRIST, SUBSEQUENT ENCOUNTER: ICD-10-CM

## 2021-12-14 DIAGNOSIS — S33.5XXD LUMBAR SPRAIN, SUBSEQUENT ENCOUNTER: Primary | ICD-10-CM

## 2021-12-14 DIAGNOSIS — S13.9XXD NECK SPRAIN, SUBSEQUENT ENCOUNTER: ICD-10-CM

## 2021-12-14 PROCEDURE — 99214 OFFICE O/P EST MOD 30 MIN: CPT | Performed by: INTERNAL MEDICINE

## 2021-12-14 NOTE — PATIENT INSTRUCTIONS
[facility] Activation    Thank you for requesting access to [facility]. Please follow the instructions below to securely access and download your online medical record. [facility] allows you to send messages to your doctor, view your test results, renew your prescriptions, schedule appointments, and more. How Do I Sign Up? 1. In your internet browser, go to [email or web address]  2. Click on the First Time User? Click Here link in the Sign In box. You will be redirect to the New Member Sign Up page. 3. Enter your [employer] Access Code exactly as it appears below. You will not need to use this code after youve completed the sign-up process. If you do not sign up before the expiration date, you must request a new code. [employer] Access Code: [email or web address]  Expires: 2021  3:15 PM (This is the date your [facility] access code will )    4. Enter the last four digits of your Social Security Number (xxxx) and Date of Birth (mm/dd/yyyy) as indicated and click Submit. You will be taken to the next sign-up page. 5. Create a [employer] ID. This will be your [employer] login ID and cannot be changed, so think of one that is secure and easy to remember. 6. Create a [employer] password. You can change your password at any time. 7. Enter your Password Reset Question and Answer. This can be used at a later time if you forget your password. 8. Enter your e-mail address. You will receive e-mail notification when new information is available in [address]. 9. Click Sign Up. You can now view and download portions of your medical record. 10. Click the Download Summary menu link to download a portable copy of your medical information. Additional Information    If you have questions, please visit the Frequently Asked Questions section of the [employer] website at [email or web address] Remember, [employer] is NOT to be used for urgent needs. For medical emergencies, dial 911.
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
## 2021-12-14 NOTE — PROGRESS NOTES
1. Have you been to the ER, urgent care clinic since your last visit? Hospitalized since your last visit?no    2. Have you seen or consulted any other health care providers outside of the [address] [place] [address] since your last visit? Include any pap smears or colon screening. No    Chief Complaint   Patient presents with    Nail Problem     toe problem    Hypertension     3 most recent PHQ Screens 6/17/2021   Little interest or pleasure in doing things Not at all   Feeling down, depressed, irritable, or hopeless Not at all   Total Score PHQ 2 0     Per Dr. Joleen Best.,  verbal order given for needed amb poc labs.

## 2021-12-17 ENCOUNTER — HOSPITAL ENCOUNTER (OUTPATIENT)
Dept: PHYSICAL THERAPY | Age: 56
Discharge: HOME | End: 2021-12-17
Payer: MEDICAID

## 2021-12-17 NOTE — PROGRESS NOTES
[facility]  [address], [address] [facility]    OUTPATIENT PHYSICAL THERAPY      12/17/2021:  Abeba Mcwilliams was not seen on this date for physical therapy for the following reasons:    [x]     Patient called to cancel the visit for the following reasons: transportation issue  []     Patient missed the visit and did not call to cancel.     Jerad Engle, PT

## 2021-12-20 ENCOUNTER — HOSPITAL ENCOUNTER (OUTPATIENT)
Dept: PHYSICAL THERAPY | Age: 56
Discharge: HOME | End: 2021-12-20
Payer: MEDICAID

## 2021-12-20 NOTE — PROGRESS NOTES
[facility]  [address], [address] [facility]    OUTPATIENT PHYSICAL THERAPY      12/20/2021:  Brian Estrada was not seen on this date for physical therapy for the following reasons:    [x]     Patient called to cancel the visit for the following reasons: transportation issue  []     Patient missed the visit and did not call to cancel.     Valente Amin, PT

## 2021-12-22 ENCOUNTER — HOSPITAL ENCOUNTER (OUTPATIENT)
Dept: PHYSICAL THERAPY | Age: 56
Discharge: HOME | End: 2021-12-22
Payer: MEDICAID

## 2021-12-22 PROCEDURE — 97140 MANUAL THERAPY 1/> REGIONS: CPT | Performed by: PHYSICAL THERAPIST

## 2021-12-22 PROCEDURE — 97110 THERAPEUTIC EXERCISES: CPT | Performed by: PHYSICAL THERAPIST

## 2021-12-22 NOTE — PROGRESS NOTES
[address]    OUTPATIENT PHYSICAL THERAPY DAILY TREATMENT NOTE  VISIT: 4    NAME: Bernardo Robertson AGE: 49 y.o. GENDER: male  DATE: 12/22/2021  REFERRING PHYSICIAN: Mario Villeda MD    GOALS  Short term goals  Time frame: 4 weeks  1. Patient will be compliant and independent with the initial HEP as evidenced by being able to perform without cuing. 2. Patient will report a 50% improvement in symptoms. 3. Patient report a 50% improvement in sleeping. 4. Patient will have a 15 degree increase in cervical flexion ROM to allow decreased pain while painting. 5. Patient will have an increased tolerance for sitting to allow 45 minutes of the activity before symptoms start. 6. Patient will tolerate 45 minutes of clinic activities before onset of symptoms.      Long term goals  Time frame: 8 weeks  1. Patient will report pain level decrease to 0/10 to allow increased ease of movement. 2. Patient will have an improved score on the NDI outcome measure by 10 points to demonstrate an increase in functional activity tolerance. 3. Patient will be independent in final individualized HEP. 4. Patient will have an increase in lumbar extension ROM to 20 to allow performance of wlaking tasks. 5. Patient will have an increase in shoulder flexion strength to 5/5 to allow performance of lifting tasks. 6. 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 7. Patient will sleep 6-8 hours without being interrupted by pain. SUBJECTIVE:   \"It's feeling better. My R leg and neck have been hurting and my back is stiff. \"    Pain In: 7/10    OBJECTIVE DATA SUMMARY:   EXAMINATION/PRESENTATION/DECISION MAKING:   Pain:  Location: Neck, BL shoulder, headache, and low lumbar region  Quality: dull and stiff, \"cloudy\"  Now: 9/10  Best: 9/10  Worst: 9/10  Easing Factors: medication, reclined lying  Aggravating Factors: walking, sitting, lying down, lifting     Strength:                                          LEFT                  RIGHT  Shoulder flexion              4/5 p!                  4/5 p! Shoulder abduction          4/5                     4/5                       Shoulder ER                   5/5                     5/5  Shoulder IR                    5/5                     5/5     Hip flexion                      5/5                     4/5 p! Hip abduction                 5/5                     5/5  Hip extension                  5/5                     5/5  Knee flexion                    5/5                     5/5  Knee extension               5/5                     5/5             Range of Motion:   UE and LE ROM WNL         Spinal Assessment:   Cervical Spine (AROM)  (*Measured with single inclinometer)  Flexion  *            20 \"stiff\"  Extension*         25 \"pressure\"  R side bend*      30 suboccipital p! L side bend*      25 L neck p!  R rotation           50 \"pressure\"  L rotation           50 L neck p!      Lumbar Spine (AROM)  (*Measured with single inclinometer)  Flexion*             50 p! Extension*         10 p! R side bend*      15  L side bend*      15  R rotation           45  L rotation           45       Joint Mobility:   C1-5 hypomobile and painful with CPA and UPA assessment  L3-5 hypomobile with local pain based on PA assessment     Palpation:   Headache reproduction with palpation of suboccipital mm and L SCM  Local pain reproduction with palpation of BL UT and lumbar paraspinal mm     Neurologic Assessment:  Sensation: N/T reported in finger tips.  Light touch intact     Special Tests:   Cervical distraction (+)     Functional Measure:   RADHA: 25/50, 50% impaired  NDI: 31/50, 62% impaired       TREATMENT/INTERVENTION:  --Interventions in BOLD performed today--   Modalities (Rationale): None today     Therapeutic Exercises: to develop strength, endurance, range of motion, and flexibility  Supine:  Chin tuck 10x 3\" with tactile cues for decreased SCM activation  Suboccipital release 3'  LTR 10x each side 3\" hold  SKTC 5x each 3\" hold     Sidelying:  Open books 10x each    Seated:  UT stretch 5x 10\" hold  LS stretch 5x 10\" hold  Lumbar flexion with theraball 10x 5\" hold    Standing:  Foam roller Tsp extension 3min  Self periscapular massage with lacrosse ball 3min  Rows at quantum 25# 3x 10     Manual Therapy: for joint mobilization/manipulations and soft tissue mobilization  STM to L periscapular mm, L LS, and L UT in R sidelying  Suboccipital release  Cervical distraction   Lubmar rotation MET 3x 5sec hold each side     Neuro Re-Education: to improve movement, balance, coordination, kinesthetic sense, posture, and proprioception for sitting or standing balance  None today     Therapeutic Activities: to improve functional performance, power, or agility  None today     Ambulation/Gait Training:  None today    Activity tolerance and post treatment pain report:   good    Pain Out: 5/10    Education:  Education was provided to the patient on the following topics: HEP performance. Relaxation techniques. Posture. []    No changes were made to the home exercise program.  [x]    The following changes were made to the home exercise program:     Access Code: [email or web address]  URL: [email or web address]  Date: 12/22/2021  Prepared by: Kendrick Kraus    Exercises  Supine Lower Trunk Rotation - 2 x daily - 10 reps  Hooklying Single Knee to Chest Stretch - 2 x daily - 10 reps  Sidelying Open Book Thoracic Lumbar Rotation and Extension - 2 x daily - 10 reps  Seated Cervical Sidebending Stretch - 2 x daily - 5 reps - 10\" hold  Seated Levator Scapulae Stretch - 2 x daily - 5 reps - 10\" hold  Standing Scapular Retraction - 2 x daily - 2 sets - 10 reps  Supine Suboccipital Release with Tennis Balls - 1 x daily - 1-2min hold    Patient verbalized understanding of the topics presented.   ASSESSMENT:   Patient continues to show soft tissue mobility, joint mobility, strength, and motor coordination deficits. Manual intervention reintroduced today with decreased neck and scapular pain. Patient tolerated increased volume of today and patient showed good response to added mobility intervention. Patient will continue to benefit from skilled Physical therapy intervention to address listed impairments to allow patient to reach his functional goals of returning to fitness routine, playing with his children, and painting. Patients progression toward goals is as follows:  [x]     Improving appropriately and progressing toward goals  []     Improving slowly and progressing toward goals  []     Not making progress toward goals and plan of care will be adjusted    PLAN OF CARE:   Patient continues to benefit from skilled intervention to address the above impairments. [x]    Continue treatment per established plan of care.   []     Recommend the following changes to the plan of care:     Recommendations/Intent for next treatment: Add scapular stabilization exercises progress mobility intervention as tolerable     Shae Cortes, PT   Time Calculation: 38 mins  Patient Time in clinic:   Start Time: David   Stop Time: 1000

## 2022-01-03 ENCOUNTER — HOSPITAL ENCOUNTER (OUTPATIENT)
Dept: PHYSICAL THERAPY | Age: 57
Discharge: HOME | End: 2022-01-03
Payer: MEDICAID

## 2022-01-03 NOTE — PROGRESS NOTES
[facility]  [address], [address] [facility]    OUTPATIENT PHYSICAL THERAPY      1/3/2022:  Luis Barker was not seen on this date for physical therapy for the following reasons:    [x]     Patient called to cancel the visit for the following reasons: inclement weather  []     Patient missed the visit and did not call to cancel.     Karen Combs, PT

## 2022-01-11 DIAGNOSIS — N52.1 ERECTILE DISORDER DUE TO MEDICAL CONDITION IN MALE: ICD-10-CM

## 2022-01-11 RX ORDER — SILDENAFIL 100 MG/1
TABLET, FILM COATED ORAL
Qty: 30 TABLET | Refills: 2 | Status: SHIPPED | OUTPATIENT
Start: 2022-01-11 | End: 2022-03-01 | Stop reason: SDUPTHER

## 2022-01-14 ENCOUNTER — HOSPITAL ENCOUNTER (OUTPATIENT)
Dept: PHYSICAL THERAPY | Age: 57
Discharge: HOME | End: 2022-01-14
Payer: MEDICAID

## 2022-01-14 PROCEDURE — 97110 THERAPEUTIC EXERCISES: CPT | Performed by: PHYSICAL THERAPIST

## 2022-01-14 NOTE — PROGRESS NOTES
[address] [place] [address]    OUTPATIENT PHYSICAL THERAPY DAILY TREATMENT AND DISCHARGE NOTE  VISIT: 5    NAME: Niecy Douglas AGE: 49 y.o. GENDER: male  DATE: 1/14/2022  REFERRING PHYSICIAN: Marleni Barriga MD    GOALS  Short term goals  Time frame: 4 weeks  1. Patient will be compliant and independent with the initial HEP as evidenced by being able to perform without cuing. MET  2. Patient will report a 50% improvement in symptoms. MET  3. Patient report a 50% improvement in sleeping. MET  4. Patient will have a 15 degree increase in cervical flexion ROM to allow decreased pain while painting. MET  5. Patient will have an increased tolerance for sitting to allow 45 minutes of the activity before symptoms start. MET  6. Patient will tolerate 45 minutes of clinic activities before onset of symptoms. MET     Long term goals  Time frame: 8 weeks  1. Patient will report pain level decrease to 0/10 to allow increased ease of movement. In Progress  2. Patient will have an improved score on the NDI outcome measure by 10 points to demonstrate an increase in functional activity tolerance. Not Met  3. Patient will be independent in final individualized HEP. MET  4. Patient will have an increase in lumbar extension ROM to 20 to allow performance of wlaking tasks. MET  5. Patient will have an increase in shoulder flexion strength to 5/5 to allow performance of lifting tasks. Not Met  6. 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 Not Met    7. Patient will sleep 6-8 hours without being interrupted by pain. Not Met     SUBJECTIVE:   \"My neck is still hurting. I feel like it is pushing out on the side.  I'm like a 5, I'm 50%\"    Pain In: 8/10 neck pain, no LBP    OBJECTIVE DATA SUMMARY:   EXAMINATION/PRESENTATION/DECISION MAKING:   Pain:  Location: Neck, BL shoulder, headache, and low lumbar region  Quality: dull and stiff, \"cloudy\"  Now: 8/10  Best: 5/10  Worst: 9/10  Easing Factors: medication, reclined lying  Aggravating Factors: walking, sitting, lying down, lifting     Strength:                                          LEFT                  RIGHT  Shoulder flexion              4/5 p!                  4/5 p! Shoulder abduction          4/5                     4/5                       Shoulder ER                   5/5                     5/5  Shoulder IR                    5/5                     5/5     Hip flexion                      5/5                     5/5   Hip abduction                 5/5                     5/5  Hip extension                  5/5                     5/5  Knee flexion                    5/5                     5/5  Knee extension               5/5                     5/5             Range of Motion:   UE and LE ROM WNL         Spinal Assessment:   Cervical Spine (AROM)  (*Measured with single inclinometer)  Flexion  *            40 \"stiff\"  Extension*         30 p! R side bend*      30 L pull  L side bend*      50 R pull  R rotation           75   L rotation           75 p! L shoulder      Lumbar Spine (AROM)  (*Measured with single inclinometer)  Flexion*             90 p! Extension*         30 p! R side bend*      15  L side bend*      15  R rotation           65  L rotation           65       Joint Mobility:   C2-5 hypomobile and painful with CPA and UPA assessment  L3-5 hypomobile without pain reproduction     Palpation:   Patient locally tender to L SCM with pain extending to L shoulder.  No headache symptoms today  Local pain reproduction with palpation of BL UT and lumbar paraspinal mm     Neurologic Assessment:  Sensation: Light touch intact     Special Tests:   Cervical distraction (+)     Functional Measure:   11/19/21 eval:  RADHA: 25/50, 50% impaired  NDI: 31/50, 62% impaired       1/14/22 discharge:  RADHA: 26/50; 52% impaired  NDI: 27/50; 54% impaired    TREATMENT/INTERVENTION:  --Interventions in BOLD performed today--   Modalities (Rationale): None today     Therapeutic Exercises: to develop strength, endurance, range of motion, and flexibility  Supine:  Chin tuck 10x 3\" with tactile cues for decreased SCM activation  Suboccipital release 3'  LTR 10x each side 3\" hold  SKTC 5x each 3\" hold     Sidelying:  Open books 10x each    Seated:  UT stretch 5x 10\" hold  LS stretch 5x 10\" hold  Lumbar flexion with theraball 10x 5\" hold  SCM stretch 5x 10\" hold    Standing:  Foam roller Tsp extension 3min  Self periscapular massage with lacrosse ball 3min  Rows at quantum 25# 3x 10     Manual Therapy: for joint mobilization/manipulations and soft tissue mobilization  STM to L periscapular mm, L LS, and L UT in R sidelying  Suboccipital release  Cervical distraction   Lubmar rotation MET 3x 5sec hold each side     Neuro Re-Education: to improve movement, balance, coordination, kinesthetic sense, posture, and proprioception for sitting or standing balance  None today     Therapeutic Activities: to improve functional performance, power, or agility  None today     Ambulation/Gait Training:  None today    Activity tolerance and post treatment pain report:   good    Pain Out: 7/10    Education:  Education was provided to the patient on the following topics: HEP performance. Relaxation techniques. Posture. []    No changes were made to the home exercise program.  [x]    The following changes were made to the home exercise program:     Access Code: [email or web address]  URL: [email or web address]  Date: 12/22/2021  Prepared by: Severiano Client    Exercises  Supine Lower Trunk Rotation - 2 x daily - 10 reps  Hooklying Single Knee to Chest Stretch - 2 x daily - 10 reps  Sidelying Open Book Thoracic Lumbar Rotation and Extension - 2 x daily - 10 reps  Seated Cervical Sidebending Stretch - 2 x daily - 5 reps - 10\" hold  Seated Levator Scapulae Stretch - 2 x daily - 5 reps - 10\" hold  Standing Scapular Retraction - 2 x daily - 2 sets - 10 reps  Supine Suboccipital Release with Tennis Balls - 1 x daily - 1-2min hold    Patient verbalized understanding of the topics presented. ASSESSMENT:   Patient has shown significant improvement in cervical and lumbar AROM and decreased LBP despite continued neck pain. PT and patient discussed discharge occurring today due to his plateau in response to PT intervention. Patient returns to PCP next week to determine if further diagnostics or medical management is needed to address remaining symptoms. Patient was encouraged to continue HEP performance while awaiting PCP visit. Patients progression toward goals is as follows:  [x]     Improving appropriately and progressing toward goals  []     Improving slowly and progressing toward goals  []     Not making progress toward goals and plan of care will be adjusted    PLAN OF CARE:   Patient will be discharged from physical therapy at this time.   Criteria for termination of care:  []   Goals Achieved  [x]   Plateau Reached  []   Patient has not returned  []   Other:  Plan for follow up, continuing care, or equipment recommendations: continue HEP, contact clinic if further PT services are warranted  Thank you for this referral.       Bella Del Toro, PT   Time Calculation: 17 mins  Patient Time in clinic:   Start Time: [facility]   Stop Time: [phone]

## 2022-01-26 ENCOUNTER — OFFICE VISIT (OUTPATIENT)
Dept: INTERNAL MEDICINE CLINIC | Age: 57
End: 2022-01-26
Payer: MEDICAID

## 2022-01-26 DIAGNOSIS — S13.9XXS NECK SPRAIN, SEQUELA: ICD-10-CM

## 2022-01-26 DIAGNOSIS — S33.5XXS LUMBAR SPRAIN, SEQUELA: Primary | ICD-10-CM

## 2022-01-26 PROCEDURE — 99213 OFFICE O/P EST LOW 20 MIN: CPT | Performed by: INTERNAL MEDICINE

## 2022-01-26 RX ORDER — TERBINAFINE HYDROCHLORIDE 250 MG/1
250 TABLET ORAL DAILY
Qty: 90 TABLET | Refills: 0 | Status: SHIPPED | OUTPATIENT
Start: 2022-01-26 | End: 2022-03-01 | Stop reason: SDUPTHER

## 2022-01-26 NOTE — PATIENT INSTRUCTIONS
[employer] Activation    Thank you for requesting access to [employer]. Please follow the instructions below to securely access and download your online medical record. [employer] allows you to send messages to your doctor, view your test results, renew your prescriptions, schedule appointments, and more. How Do I Sign Up? 1. In your internet browser, go to [email or web address]  2. Click on the First Time User? Click Here link in the Sign In box. You will be redirect to the New Member Sign Up page. 3. Enter your [employer] Access Code exactly as it appears below. You will not need to use this code after youve completed the sign-up process. If you do not sign up before the expiration date, you must request a new code. [employer] Access Code: [email or web address]  Expires: 2/10/2022 10:19 AM (This is the date your [employer] access code will )    4. Enter the last four digits of your Social Security Number (xxxx) and Date of Birth (mm/dd/yyyy) as indicated and click Submit. You will be taken to the next sign-up page. 5. Create a [employer] ID. This will be your [employer] login ID and cannot be changed, so think of one that is secure and easy to remember. 6. Create a [employer] password. You can change your password at any time. 7. Enter your Password Reset Question and Answer. This can be used at a later time if you forget your password. 8. Enter your e-mail address. You will receive e-mail notification when new information is available in [address] 9. Click Sign Up. You can now view and download portions of your medical record. 10. Click the Download Summary menu link to download a portable copy of your medical information. Additional Information    If you have questions, please visit the Frequently Asked Questions section of the [employer] website at [email or web address]/. Remember, [employer] is NOT to be used for urgent needs. For medical emergencies, dial 911.
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
## 2022-02-02 ENCOUNTER — TELEPHONE (OUTPATIENT)
Dept: INTERNAL MEDICINE CLINIC | Age: 57
End: 2022-02-02

## 2022-02-07 ENCOUNTER — TELEPHONE (OUTPATIENT)
Dept: INTERNAL MEDICINE CLINIC | Age: 57
End: 2022-02-07

## 2022-02-07 NOTE — TELEPHONE ENCOUNTER
Spoke to [address] [place] [address], regarding appointment for patient. They will call patient today.

## 2022-02-18 ENCOUNTER — HOSPITAL ENCOUNTER (OUTPATIENT)
Dept: PHYSICAL THERAPY | Age: 57
Discharge: HOME | End: 2022-02-18
Payer: MEDICAID

## 2022-02-18 PROCEDURE — 97161 PT EVAL LOW COMPLEX 20 MIN: CPT | Performed by: PHYSICAL THERAPIST
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
## 2022-02-28 ENCOUNTER — HOSPITAL ENCOUNTER (OUTPATIENT)
Dept: PHYSICAL THERAPY | Age: 57
End: 2022-02-28
Payer: MEDICAID

## 2022-03-01 ENCOUNTER — OFFICE VISIT (OUTPATIENT)
Dept: INTERNAL MEDICINE CLINIC | Age: 57
End: 2022-03-01
Payer: MEDICAID

## 2022-03-01 VITALS
HEART RATE: 76 BPM | WEIGHT: 170 LBS | HEIGHT: 69 IN | TEMPERATURE: 98.4 F | OXYGEN SATURATION: 98 % | DIASTOLIC BLOOD PRESSURE: 70 MMHG | BODY MASS INDEX: 25.18 KG/M2 | RESPIRATION RATE: 20 BRPM | SYSTOLIC BLOOD PRESSURE: 110 MMHG

## 2022-03-01 DIAGNOSIS — N52.1 ERECTILE DISORDER DUE TO MEDICAL CONDITION IN MALE: ICD-10-CM

## 2022-03-01 DIAGNOSIS — S13.9XXS NECK SPRAIN, SEQUELA: ICD-10-CM

## 2022-03-01 DIAGNOSIS — S33.5XXS LUMBAR SPRAIN, SEQUELA: ICD-10-CM

## 2022-03-01 DIAGNOSIS — B35.1 ONYCHOMYCOSIS OF GREAT TOE: Primary | ICD-10-CM

## 2022-03-01 PROCEDURE — 99214 OFFICE O/P EST MOD 30 MIN: CPT | Performed by: INTERNAL MEDICINE

## 2022-03-01 RX ORDER — TERBINAFINE HYDROCHLORIDE 250 MG/1
250 TABLET ORAL DAILY
Qty: 30 TABLET | Refills: 1 | Status: SHIPPED | OUTPATIENT
Start: 2022-03-01

## 2022-03-01 RX ORDER — SILDENAFIL 100 MG/1
TABLET, FILM COATED ORAL
Qty: 30 TABLET | Refills: 2 | Status: SHIPPED | OUTPATIENT
Start: 2022-03-01 | End: 2022-09-01
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
## 2022-03-01 NOTE — PATIENT INSTRUCTIONS
[employer] Activation    Thank you for requesting access to [employer]. Please follow the instructions below to securely access and download your online medical record. [employer] allows you to send messages to your doctor, view your test results, renew your prescriptions, schedule appointments, and more. How Do I Sign Up? 1. In your internet browser, go to [email or web address] [employer] Dot  2. Click on the First Time User? Click Here link in the Sign In box. You will be redirect to the New Member Sign Up page. 3. Enter your [employer] Access Code exactly as it appears below. You will not need to use this code after youve completed the sign-up process. If you do not sign up before the expiration date, you must request a new code. [employer] Access Code: [email or web address]  Expires: 3/28/2022  9:37 AM (This is the date your [employer] access code will )    4. Enter the last four digits of your Social Security Number (xxxx) and Date of Birth (mm/dd/yyyy) as indicated and click Submit. You will be taken to the next sign-up page. 5. Create a [employer] ID. This will be your [employer] login ID and cannot be changed, so think of one that is secure and easy to remember. 6. Create a [employer] password. You can change your password at any time. 7. Enter your Password Reset Question and Answer. This can be used at a later time if you forget your password. 8. Enter your e-mail address. You will receive e-mail notification when new information is available in [address] 9. Click Sign Up. You can now view and download portions of your medical record. 10. Click the Download Summary menu link to download a portable copy of your medical information. Additional Information    If you have questions, please visit the Frequently Asked Questions section of the [employer] website at [email or web address]. [email or web address] [email or web address]/. Remember, [employer] is NOT to be used for urgent needs. For medical emergencies, dial 911.

## 2022-03-02 ENCOUNTER — HOSPITAL ENCOUNTER (OUTPATIENT)
Dept: PHYSICAL THERAPY | Age: 57
Discharge: HOME | End: 2022-03-02

## 2022-03-02 NOTE — PROGRESS NOTES
Physical Therapy at [facility],   a part of [facility]  [address] [address], [address] [place] [address], [address] [place] [address]  Phone: [phone]  Fax: [phone]    Medicaid Discharge Summary  2-15    Patient name: Scotty Ferreira  : 1965  Provider#: [id number]  Referral source: Parris Wong MD      Medical/Treatment Diagnosis: Sprain of ligaments of lumbar spine, sequela [S33. 5XXS]  Sprain of joints and ligaments of unspecified parts of neck, sequela [S13. 9XXS]     Prior Hospitalization: see medical history     Comorbidities: hypertension  Prior Level of Function: complete 20 minutes of exercise at least 3 times a week  Medications: Verified on Patient Summary List     Start of Care: 2022                                                                              Onset Date: 10/28/2021                                           Visits from Start of Care: 1    Missed Visits: 3  Reporting Period : 2022 to 2022    Short Term Goals: To be accomplished in 4-6 treatments:  1) Pt will be independent with HEP NOT MET  2) Pt will be able to sit with upright posture >/= 40 minutes without increase of symptoms NOT MET  3) Pt will report improvement in cervical Rotation to look over shoulders while driving. NOT MET     Long Term Goals: To be accomplished in 8-12 treatments:  1) Pt will be able to read without increase of neck pain NOT MET  2) Pt will be able to look over shoulders while driving without increase of neck pain NOT MET  3) Pt will demonstrate ability to lift >/= 30 lbs without increase of symptoms NOT MET  4) Pt will be able to sleep through the night without waking in pain NOT MET  5) Pt will be able to rise from supine to sitting without head lag or pain. NOT MET         ASSESSMENT/SUMMARY OF CARE:  Mr. Larey Bosworth has not returned for any follow-up sessions. He has been discharged from physical therapy at this time.     RECOMMENDATIONS:  [x]Discontinue therapy: []Patient has reached or is progressing toward set goals      [x]Patient is non-compliant or has abdicated      []Due to lack of appreciable progress towards set goals    Baron Conn, PT, DPT 3/2/2022     ______________________________________________________________________    NOTE TO PHYSICIAN:  Please complete the following and fax to:  Physical Therapy at [facility]: Fax: [phone] . Katie Orozco Retain this original for your records. If you are unable to process this request in 24 hours, please contact our office.      Physician's Signature:____________________  Date:____________Time:_________           Maribel Reynolds MD

## 2022-03-07 ENCOUNTER — APPOINTMENT (OUTPATIENT)
Dept: PHYSICAL THERAPY | Age: 57
End: 2022-03-07

## 2022-03-09 ENCOUNTER — APPOINTMENT (OUTPATIENT)
Dept: PHYSICAL THERAPY | Age: 57
End: 2022-03-09

## 2022-03-14 ENCOUNTER — APPOINTMENT (OUTPATIENT)
Dept: PHYSICAL THERAPY | Age: 57
End: 2022-03-14

## 2022-03-16 ENCOUNTER — APPOINTMENT (OUTPATIENT)
Dept: PHYSICAL THERAPY | Age: 57
End: 2022-03-16

## 2022-03-21 ENCOUNTER — APPOINTMENT (OUTPATIENT)
Dept: PHYSICAL THERAPY | Age: 57
End: 2022-03-21

## 2022-03-23 ENCOUNTER — APPOINTMENT (OUTPATIENT)
Dept: PHYSICAL THERAPY | Age: 57
End: 2022-03-23

## 2022-06-10 ENCOUNTER — OFFICE VISIT (OUTPATIENT)
Dept: INTERNAL MEDICINE CLINIC | Age: 57
End: 2022-06-10
Payer: MEDICAID

## 2022-06-10 VITALS
WEIGHT: 160 LBS | BODY MASS INDEX: 23.7 KG/M2 | TEMPERATURE: 98.3 F | HEART RATE: 70 BPM | RESPIRATION RATE: 18 BRPM | HEIGHT: 69 IN | SYSTOLIC BLOOD PRESSURE: 120 MMHG | OXYGEN SATURATION: 98 % | DIASTOLIC BLOOD PRESSURE: 70 MMHG

## 2022-06-10 DIAGNOSIS — M12.812 ROTATOR CUFF ARTHROPATHY OF LEFT SHOULDER: Primary | ICD-10-CM

## 2022-06-10 DIAGNOSIS — R35.0 FREQUENCY OF URINATION: ICD-10-CM

## 2022-06-10 DIAGNOSIS — I10 PRIMARY HYPERTENSION: ICD-10-CM

## 2022-06-10 DIAGNOSIS — E78.00 HYPERCHOLESTEREMIA: ICD-10-CM

## 2022-06-10 LAB
ALBUMIN SERPL-MCNC: 4.8 G/DL (ref 3.5–5)
ALBUMIN/GLOB SERPL: 1.4 {RATIO} (ref 1.1–2.2)
ALP SERPL-CCNC: 93 U/L (ref 45–117)
ALT SERPL-CCNC: 18 U/L (ref 12–78)
ANION GAP SERPL CALC-SCNC: 5 MMOL/L (ref 5–15)
AST SERPL-CCNC: 16 U/L (ref 15–37)
BILIRUB SERPL-MCNC: 0.7 MG/DL (ref 0.2–1)
BUN SERPL-MCNC: 14 MG/DL (ref 6–20)
BUN/CREAT SERPL: 14 (ref 12–20)
CALCIUM SERPL-MCNC: 9.5 MG/DL (ref 8.5–10.1)
CHLORIDE SERPL-SCNC: 106 MMOL/L (ref 97–108)
CHOLEST SERPL-MCNC: 191 MG/DL
CO2 SERPL-SCNC: 30 MMOL/L (ref 21–32)
CREAT SERPL-MCNC: 1.01 MG/DL (ref 0.7–1.3)
ERYTHROCYTE [DISTWIDTH] IN BLOOD BY AUTOMATED COUNT: 14.5 % (ref 11.5–14.5)
GLOBULIN SER CALC-MCNC: 3.4 G/DL (ref 2–4)
GLUCOSE SERPL-MCNC: 98 MG/DL (ref 65–100)
HCT VFR BLD AUTO: 47.6 % (ref 36.6–50.3)
HDLC SERPL-MCNC: 46 MG/DL
HDLC SERPL: 4.2 {RATIO} (ref 0–5)
HGB BLD-MCNC: 14.8 G/DL (ref 12.1–17)
LDLC SERPL CALC-MCNC: 127.8 MG/DL (ref 0–100)
MCH RBC QN AUTO: 28 PG (ref 26–34)
MCHC RBC AUTO-ENTMCNC: 31.1 G/DL (ref 30–36.5)
MCV RBC AUTO: 90 FL (ref 80–99)
NRBC # BLD: 0 K/UL (ref 0–0.01)
NRBC BLD-RTO: 0 PER 100 WBC
PLATELET # BLD AUTO: 231 K/UL (ref 150–400)
PMV BLD AUTO: 10.1 FL (ref 8.9–12.9)
POTASSIUM SERPL-SCNC: 4.1 MMOL/L (ref 3.5–5.1)
PROT SERPL-MCNC: 8.2 G/DL (ref 6.4–8.2)
PSA SERPL-MCNC: 5.2 NG/ML (ref 0.01–4)
RBC # BLD AUTO: 5.29 M/UL (ref 4.1–5.7)
SODIUM SERPL-SCNC: 141 MMOL/L (ref 136–145)
TRIGL SERPL-MCNC: 86 MG/DL (ref ?–150)
VLDLC SERPL CALC-MCNC: 17.2 MG/DL
WBC # BLD AUTO: 5.2 K/UL (ref 4.1–11.1)

## 2022-06-10 PROCEDURE — 99214 OFFICE O/P EST MOD 30 MIN: CPT | Performed by: INTERNAL MEDICINE

## 2022-06-10 PROCEDURE — 20605 DRAIN/INJ JOINT/BURSA W/O US: CPT | Performed by: INTERNAL MEDICINE

## 2022-06-10 RX ORDER — AMLODIPINE BESYLATE 2.5 MG/1
2.5 TABLET ORAL DAILY
Qty: 90 TABLET | Refills: 3 | Status: SHIPPED | OUTPATIENT
Start: 2022-06-10

## 2022-06-10 RX ORDER — CLOBETASOL PROPIONATE 0.5 MG/G
OINTMENT TOPICAL 2 TIMES DAILY
Qty: 30 G | Refills: 12 | Status: SHIPPED | OUTPATIENT
Start: 2022-06-10
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
## 2022-06-10 NOTE — PATIENT INSTRUCTIONS
Musement Activation    Thank you for requesting access to Musement. Please follow the instructions below to securely access and download your online medical record. Musement allows you to send messages to your doctor, view your test results, renew your prescriptions, schedule appointments, and more. How Do I Sign Up? 1. In your internet browser, go to [email or web address]  2. Click on the First Time User? Click Here link in the Sign In box. You will be redirect to the New Member Sign Up page. 3. Enter your Musement Access Code exactly as it appears below. You will not need to use this code after youve completed the sign-up process. If you do not sign up before the expiration date, you must request a new code. Musement Access Code: [email or web address]  Expires: 2022 10:56 AM (This is the date your Musement access code will )    4. Enter the last four digits of your Social Security Number (xxxx) and Date of Birth (mm/dd/yyyy) as indicated and click Submit. You will be taken to the next sign-up page. 5. Create a Musement ID. This will be your Musement login ID and cannot be changed, so think of one that is secure and easy to remember. 6. Create a Musement password. You can change your password at any time. 7. Enter your Password Reset Question and Answer. This can be used at a later time if you forget your password. 8. Enter your e-mail address. You will receive e-mail notification when new information is available in [address]. 9. Click Sign Up. You can now view and download portions of your medical record. 10. Click the Download Summary menu link to download a portable copy of your medical information. Additional Information    If you have questions, please visit the Frequently Asked Questions section of the Musement website at [email or web address] Remember, Musement is NOT to be used for urgent needs. For medical emergencies, dial 911.

## 2022-06-15 ENCOUNTER — HOSPITAL ENCOUNTER (OUTPATIENT)
Dept: GENERAL RADIOLOGY | Age: 57
Discharge: HOME | End: 2022-06-15
Payer: MEDICAID

## 2022-06-15 DIAGNOSIS — M12.812 ROTATOR CUFF ARTHROPATHY OF LEFT SHOULDER: ICD-10-CM

## 2022-06-15 PROCEDURE — 73030 X-RAY EXAM OF SHOULDER: CPT

## 2022-06-19 NOTE — PROGRESS NOTES
Endoscope was pre-cleaned at the bedside immediately following procedure by [facility]      Anesthesia reports 480mg Propofol, 40mg Lidocaine and 600mL NS given during procedure. Received report from anesthesia staff on vital signs and status of patient. No

## 2022-07-21 ENCOUNTER — OFFICE VISIT (OUTPATIENT)
Dept: INTERNAL MEDICINE CLINIC | Age: 57
End: 2022-07-21
Payer: MEDICAID

## 2022-07-21 VITALS
HEIGHT: 69 IN | TEMPERATURE: 98 F | OXYGEN SATURATION: 99 % | HEART RATE: 65 BPM | DIASTOLIC BLOOD PRESSURE: 84 MMHG | BODY MASS INDEX: 23.55 KG/M2 | SYSTOLIC BLOOD PRESSURE: 122 MMHG | RESPIRATION RATE: 16 BRPM | WEIGHT: 159 LBS

## 2022-07-21 DIAGNOSIS — R97.20 PSA ELEVATION: Primary | ICD-10-CM

## 2022-07-21 DIAGNOSIS — I10 PRIMARY HYPERTENSION: ICD-10-CM

## 2022-07-21 DIAGNOSIS — M12.812 ROTATOR CUFF ARTHROPATHY OF LEFT SHOULDER: ICD-10-CM

## 2022-07-21 PROCEDURE — 99213 OFFICE O/P EST LOW 20 MIN: CPT | Performed by: INTERNAL MEDICINE

## 2022-07-21 NOTE — PATIENT INSTRUCTIONS
Palmap Activation    Thank you for requesting access to Palmap. Please follow the instructions below to securely access and download your online medical record. Palmap allows you to send messages to your doctor, view your test results, renew your prescriptions, schedule appointments, and more. How Do I Sign Up? In your internet browser, go to [email or web address]  Click on the First Time User? Click Here link in the Sign In box. You will be redirect to the New Member Sign Up page. Enter your Palmap Access Code exactly as it appears below. You will not need to use this code after youve completed the sign-up process. If you do not sign up before the expiration date, you must request a new code. Palmap Access Code: [email or web address]  Expires: 2022 10:31 AM (This is the date your Palmap access code will )    Enter the last four digits of your Social Security Number (xxxx) and Date of Birth (mm/dd/yyyy) as indicated and click Submit. You will be taken to the next sign-up page. Create a Palmap ID. This will be your Palmap login ID and cannot be changed, so think of one that is secure and easy to remember. Create a Palmap password. You can change your password at any time. Enter your Password Reset Question and Answer. This can be used at a later time if you forget your password. Enter your e-mail address. You will receive e-mail notification when new information is available in [address] Click Sign Up. You can now view and download portions of your medical record. Click the [place] link to download a portable copy of your medical information. Additional Information    If you have questions, please visit the Frequently Asked Questions section of the Palmap website at [email or web address] Remember, Palmap is NOT to be used for urgent needs. For medical emergencies, dial 911.

## 2022-07-21 NOTE — PROGRESS NOTES
1. Have you been to the ER, urgent care clinic since your last visit? Hospitalized since your last visit?no    2. Have you seen or consulted any other health care providers outside of the [address] [place] [address] since your last visit? Include any pap smears or colon screening.  No    Chief Complaint   Patient presents with    Hypertension     3 most recent PHQ Screens 7/21/2022   Little interest or pleasure in doing things Not at all   Feeling down, depressed, irritable, or hopeless Not at all   Total Score PHQ 2 0
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
## 2022-09-01 DIAGNOSIS — N52.1 ERECTILE DISORDER DUE TO MEDICAL CONDITION IN MALE: ICD-10-CM

## 2022-09-01 RX ORDER — SILDENAFIL 100 MG/1
TABLET, FILM COATED ORAL
Qty: 30 TABLET | Refills: 2 | Status: SHIPPED | OUTPATIENT
Start: 2022-09-01

## 2022-11-03 ENCOUNTER — OFFICE VISIT (OUTPATIENT)
Dept: INTERNAL MEDICINE CLINIC | Age: 57
End: 2022-11-03
Payer: MEDICAID

## 2022-11-03 VITALS
TEMPERATURE: 98 F | HEIGHT: 69 IN | DIASTOLIC BLOOD PRESSURE: 84 MMHG | BODY MASS INDEX: 23.99 KG/M2 | RESPIRATION RATE: 16 BRPM | SYSTOLIC BLOOD PRESSURE: 136 MMHG | OXYGEN SATURATION: 94 % | HEART RATE: 68 BPM | WEIGHT: 162 LBS

## 2022-11-03 DIAGNOSIS — N52.1 ERECTILE DISORDER DUE TO MEDICAL CONDITION IN MALE: Primary | ICD-10-CM

## 2022-11-03 DIAGNOSIS — I10 PRIMARY HYPERTENSION: ICD-10-CM

## 2022-11-03 PROCEDURE — 99214 OFFICE O/P EST MOD 30 MIN: CPT | Performed by: INTERNAL MEDICINE

## 2022-11-03 NOTE — PATIENT INSTRUCTIONS
TechDevils Activation    Thank you for requesting access to TechDevils. Please follow the instructions below to securely access and download your online medical record. [employer] allows you to send messages to your doctor, view your test results, renew your prescriptions, schedule appointments, and more. How Do I Sign Up? In your internet browser, go to [email or web address]  Click on the First Time User? Click Here link in the Sign In box. You will be redirect to the New Member Sign Up page. Enter your TechDevils Access Code exactly as it appears below. You will not need to use this code after youve completed the sign-up process. If you do not sign up before the expiration date, you must request a new code. TechDevils Access Code: [email or web address]  Expires: 2022  2:48 PM (This is the date your TechDevils access code will )    Enter the last four digits of your Social Security Number (xxxx) and Date of Birth (mm/dd/yyyy) as indicated and click Submit. You will be taken to the next sign-up page. Create a TechDevils ID. This will be your TechDevils login ID and cannot be changed, so think of one that is secure and easy to remember. Create a TechDevils password. You can change your password at any time. Enter your Password Reset Question and Answer. This can be used at a later time if you forget your password. Enter your e-mail address. You will receive e-mail notification when new information is available in [address] Click Sign Up. You can now view and download portions of your medical record. Click the [place] link to download a portable copy of your medical information. Additional Information    If you have questions, please visit the Frequently Asked Questions section of the TechDevils website at [email or web address] Remember, TechDevils is NOT to be used for urgent needs. For medical emergencies, dial 911.
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
## 2022-11-03 NOTE — PROGRESS NOTES
1. Have you been to the ER, urgent care clinic since your last visit? Hospitalized since your last visit?no    2. Have you seen or consulted any other health care providers outside of the [address] [place] [address] since your last visit? Include any pap smears or colon screening.  No    Chief Complaint   Patient presents with    Hypertension

## 2022-11-04 LAB
ALBUMIN SERPL-MCNC: 4.4 G/DL (ref 3.5–5)
ALBUMIN/GLOB SERPL: 1.3 {RATIO} (ref 1.1–2.2)
ALP SERPL-CCNC: 87 U/L (ref 45–117)
ALT SERPL-CCNC: 29 U/L (ref 12–78)
ANION GAP SERPL CALC-SCNC: 4 MMOL/L (ref 5–15)
AST SERPL-CCNC: 16 U/L (ref 15–37)
BILIRUB SERPL-MCNC: 1.2 MG/DL (ref 0.2–1)
BUN SERPL-MCNC: 18 MG/DL (ref 6–20)
BUN/CREAT SERPL: 18 (ref 12–20)
CALCIUM SERPL-MCNC: 9.5 MG/DL (ref 8.5–10.1)
CHLORIDE SERPL-SCNC: 105 MMOL/L (ref 97–108)
CHOLEST SERPL-MCNC: 200 MG/DL
CO2 SERPL-SCNC: 31 MMOL/L (ref 21–32)
CREAT SERPL-MCNC: 1.02 MG/DL (ref 0.7–1.3)
ERYTHROCYTE [DISTWIDTH] IN BLOOD BY AUTOMATED COUNT: 14.4 % (ref 11.5–14.5)
GLOBULIN SER CALC-MCNC: 3.3 G/DL (ref 2–4)
GLUCOSE SERPL-MCNC: 87 MG/DL (ref 65–100)
HCT VFR BLD AUTO: 46 % (ref 36.6–50.3)
HDLC SERPL-MCNC: 50 MG/DL
HDLC SERPL: 4 {RATIO} (ref 0–5)
HGB BLD-MCNC: 15 G/DL (ref 12.1–17)
LDLC SERPL CALC-MCNC: 127 MG/DL (ref 0–100)
MCH RBC QN AUTO: 28.3 PG (ref 26–34)
MCHC RBC AUTO-ENTMCNC: 32.6 G/DL (ref 30–36.5)
MCV RBC AUTO: 86.8 FL (ref 80–99)
NRBC # BLD: 0 K/UL (ref 0–0.01)
NRBC BLD-RTO: 0 PER 100 WBC
PLATELET # BLD AUTO: 216 K/UL (ref 150–400)
PMV BLD AUTO: 10.5 FL (ref 8.9–12.9)
POTASSIUM SERPL-SCNC: 3.8 MMOL/L (ref 3.5–5.1)
PROT SERPL-MCNC: 7.7 G/DL (ref 6.4–8.2)
RBC # BLD AUTO: 5.3 M/UL (ref 4.1–5.7)
SODIUM SERPL-SCNC: 140 MMOL/L (ref 136–145)
TRIGL SERPL-MCNC: 115 MG/DL (ref ?–150)
VLDLC SERPL CALC-MCNC: 23 MG/DL
WBC # BLD AUTO: 6.1 K/UL (ref 4.1–11.1)

## 2022-11-08 DIAGNOSIS — N52.1 ERECTILE DISORDER DUE TO MEDICAL CONDITION IN MALE: ICD-10-CM

## 2022-11-09 RX ORDER — SILDENAFIL 100 MG/1
TABLET, FILM COATED ORAL
Qty: 30 TABLET | Refills: 2 | Status: SHIPPED | OUTPATIENT
Start: 2022-11-09

## 2022-11-09 RX ORDER — AMLODIPINE BESYLATE 2.5 MG/1
2.5 TABLET ORAL DAILY
Qty: 90 TABLET | Refills: 3 | Status: SHIPPED | OUTPATIENT
Start: 2022-11-09

## 2023-01-24 ENCOUNTER — CLINICAL SUPPORT (OUTPATIENT)
Dept: INTERNAL MEDICINE CLINIC | Age: 58
End: 2023-01-24

## 2023-01-24 VITALS — DIASTOLIC BLOOD PRESSURE: 80 MMHG | SYSTOLIC BLOOD PRESSURE: 134 MMHG

## 2023-01-24 DIAGNOSIS — Z01.30 BLOOD PRESSURE CHECK: Primary | ICD-10-CM

## 2023-02-02 ENCOUNTER — OFFICE VISIT (OUTPATIENT)
Dept: INTERNAL MEDICINE CLINIC | Age: 58
End: 2023-02-02
Payer: MEDICAID

## 2023-02-02 VITALS
BODY MASS INDEX: 24.48 KG/M2 | HEART RATE: 74 BPM | RESPIRATION RATE: 16 BRPM | SYSTOLIC BLOOD PRESSURE: 125 MMHG | TEMPERATURE: 98.3 F | DIASTOLIC BLOOD PRESSURE: 80 MMHG | HEIGHT: 69 IN | OXYGEN SATURATION: 97 % | WEIGHT: 165.3 LBS

## 2023-02-02 DIAGNOSIS — E78.00 PURE HYPERCHOLESTEROLEMIA: ICD-10-CM

## 2023-02-02 DIAGNOSIS — R97.20 PSA ELEVATION: ICD-10-CM

## 2023-02-02 DIAGNOSIS — I10 PRIMARY HYPERTENSION: Primary | ICD-10-CM

## 2023-02-02 DIAGNOSIS — N52.1 ERECTILE DISORDER DUE TO MEDICAL CONDITION IN MALE: ICD-10-CM

## 2023-02-02 RX ORDER — SILDENAFIL 100 MG/1
TABLET, FILM COATED ORAL
Qty: 30 TABLET | Refills: 12 | Status: SHIPPED | OUTPATIENT
Start: 2023-02-02
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
## 2023-02-02 NOTE — PROGRESS NOTES
Fernanda Ramirez is a 56 y.o. male    Chief Complaint   Patient presents with    Follow-up       Visit Vitals  /80 (BP 1 Location: Left upper arm, BP Patient Position: Sitting, BP Cuff Size: Adult)   Pulse 74   Temp 98.3 °F (36.8 °C) (Oral)   Resp 16   Ht 5' 9\" (1.753 m)   Wt 165 lb 4.8 oz (75 kg)   SpO2 97%   BMI 24.41 kg/m²           1. Have you been to the ER, urgent care clinic since your last visit? Hospitalized since your last visit? NO    2. Have you seen or consulted any other health care providers outside of the [address] [place] [address] since your last visit? Include any pap smears or colon screening.  NO

## 2023-02-02 NOTE — PATIENT INSTRUCTIONS
115 network disks Activation    Thank you for requesting access to 115 network [employer]. Please follow the instructions below to securely access and download your online medical record. 115 network disks allows you to send messages to your doctor, view your test results, renew your prescriptions, schedule appointments, and more. How Do I Sign Up? In your internet browser, go to [email or web address]  Click on the First Time User? Click Here link in the Sign In box. You will be redirect to the New Member Sign Up page. Enter your 115 network disks Access Code exactly as it appears below. You will not need to use this code after youve completed the sign-up process. If you do not sign up before the expiration date, you must request a new code. 115 network disks Access Code: [email or web address]  Expires: 3/10/2023 10:46 AM (This is the date your 115 network disks access code will )    Enter the last four digits of your Social Security Number (xxxx) and Date of Birth (mm/dd/yyyy) as indicated and click Submit. You will be taken to the next sign-up page. Create a 115 network disks ID. This will be your 115 network disks login ID and cannot be changed, so think of one that is secure and easy to remember. Create a 115 network disks password. You can change your password at any time. Enter your Password Reset Question and Answer. This can be used at a later time if you forget your password. Enter your e-mail address. You will receive e-mail notification when new information is available in [address] Click Sign Up. You can now view and download portions of your medical record. Click the [place] [employer] link to download a portable copy of your medical information. Additional Information    If you have questions, please visit the Frequently Asked Questions section of the 115 network disks website at [email or web address] Remember, 115 network disks is NOT to be used for urgent needs. For medical emergencies, dial 911.

## 2023-02-03 LAB
CHOLEST SERPL-MCNC: 179 MG/DL
HDLC SERPL-MCNC: 40 MG/DL
HDLC SERPL: 4.5 (ref 0–5)
LDLC SERPL CALC-MCNC: 112.6 MG/DL (ref 0–100)
TRIGL SERPL-MCNC: 132 MG/DL (ref ?–150)
VLDLC SERPL CALC-MCNC: 26.4 MG/DL

## 2023-02-03 RX ORDER — ROSUVASTATIN CALCIUM 10 MG/1
10 TABLET, COATED ORAL
Qty: 30 TABLET | Refills: 12 | Status: SHIPPED | OUTPATIENT
Start: 2023-02-03

## 2023-05-03 ENCOUNTER — OFFICE VISIT (OUTPATIENT)
Dept: INTERNAL MEDICINE CLINIC | Age: 58
End: 2023-05-03
Payer: MEDICAID

## 2023-05-03 VITALS
OXYGEN SATURATION: 98 % | TEMPERATURE: 98 F | WEIGHT: 161 LBS | HEART RATE: 72 BPM | HEIGHT: 69 IN | RESPIRATION RATE: 16 BRPM | BODY MASS INDEX: 23.85 KG/M2 | SYSTOLIC BLOOD PRESSURE: 134 MMHG | DIASTOLIC BLOOD PRESSURE: 82 MMHG

## 2023-05-03 DIAGNOSIS — I10 PRIMARY HYPERTENSION: ICD-10-CM

## 2023-05-03 DIAGNOSIS — E78.00 PURE HYPERCHOLESTEROLEMIA: ICD-10-CM

## 2023-05-03 DIAGNOSIS — B36.0 TINEA VERSICOLOR: Primary | ICD-10-CM

## 2023-05-03 PROCEDURE — 99214 OFFICE O/P EST MOD 30 MIN: CPT | Performed by: INTERNAL MEDICINE

## 2023-05-03 RX ORDER — FLUCONAZOLE 150 MG/1
TABLET ORAL
Qty: 4 TABLET | Refills: 3 | Status: SHIPPED | OUTPATIENT
Start: 2023-05-03

## 2023-06-20 ENCOUNTER — OFFICE VISIT (OUTPATIENT)
Facility: CLINIC | Age: 58
End: 2023-06-20
Payer: MEDICAID

## 2023-06-20 VITALS
SYSTOLIC BLOOD PRESSURE: 120 MMHG | BODY MASS INDEX: 23.7 KG/M2 | DIASTOLIC BLOOD PRESSURE: 70 MMHG | TEMPERATURE: 98 F | OXYGEN SATURATION: 98 % | RESPIRATION RATE: 18 BRPM | HEIGHT: 69 IN | WEIGHT: 160 LBS | HEART RATE: 82 BPM

## 2023-06-20 DIAGNOSIS — I10 ESSENTIAL (PRIMARY) HYPERTENSION: ICD-10-CM

## 2023-06-20 DIAGNOSIS — M54.12 CERVICAL RADICULOPATHY: Primary | ICD-10-CM

## 2023-06-20 DIAGNOSIS — E78.00 PURE HYPERCHOLESTEROLEMIA, UNSPECIFIED: ICD-10-CM

## 2023-06-20 LAB
ALBUMIN SERPL-MCNC: 4.4 G/DL (ref 3.5–5)
ALBUMIN/GLOB SERPL: 1.3 (ref 1.1–2.2)
ALP SERPL-CCNC: 84 U/L (ref 45–117)
ALT SERPL-CCNC: 20 U/L (ref 12–78)
ANION GAP SERPL CALC-SCNC: 5 MMOL/L (ref 5–15)
AST SERPL-CCNC: 10 U/L (ref 15–37)
BILIRUB SERPL-MCNC: 1.4 MG/DL (ref 0.2–1)
BUN SERPL-MCNC: 18 MG/DL (ref 6–20)
BUN/CREAT SERPL: 19 (ref 12–20)
CALCIUM SERPL-MCNC: 9.3 MG/DL (ref 8.5–10.1)
CHLORIDE SERPL-SCNC: 107 MMOL/L (ref 97–108)
CHOLEST SERPL-MCNC: 186 MG/DL
CO2 SERPL-SCNC: 29 MMOL/L (ref 21–32)
CREAT SERPL-MCNC: 0.96 MG/DL (ref 0.7–1.3)
ERYTHROCYTE [DISTWIDTH] IN BLOOD BY AUTOMATED COUNT: 14.1 % (ref 11.5–14.5)
GLOBULIN SER CALC-MCNC: 3.3 G/DL (ref 2–4)
GLUCOSE SERPL-MCNC: 99 MG/DL (ref 65–100)
HCT VFR BLD AUTO: 45 % (ref 36.6–50.3)
HDLC SERPL-MCNC: 50 MG/DL
HDLC SERPL: 3.7 (ref 0–5)
HGB BLD-MCNC: 14.6 G/DL (ref 12.1–17)
LDLC SERPL CALC-MCNC: 115 MG/DL (ref 0–100)
MCH RBC QN AUTO: 27.8 PG (ref 26–34)
MCHC RBC AUTO-ENTMCNC: 32.4 G/DL (ref 30–36.5)
MCV RBC AUTO: 85.6 FL (ref 80–99)
NRBC # BLD: 0 K/UL (ref 0–0.01)
NRBC BLD-RTO: 0 PER 100 WBC
PLATELET # BLD AUTO: 209 K/UL (ref 150–400)
PMV BLD AUTO: 10.3 FL (ref 8.9–12.9)
POTASSIUM SERPL-SCNC: 4.1 MMOL/L (ref 3.5–5.1)
PROT SERPL-MCNC: 7.7 G/DL (ref 6.4–8.2)
RBC # BLD AUTO: 5.26 M/UL (ref 4.1–5.7)
SODIUM SERPL-SCNC: 141 MMOL/L (ref 136–145)
TRIGL SERPL-MCNC: 105 MG/DL
VLDLC SERPL CALC-MCNC: 21 MG/DL
WBC # BLD AUTO: 5.1 K/UL (ref 4.1–11.1)

## 2023-06-20 PROCEDURE — 3074F SYST BP LT 130 MM HG: CPT | Performed by: INTERNAL MEDICINE

## 2023-06-20 PROCEDURE — 3078F DIAST BP <80 MM HG: CPT | Performed by: INTERNAL MEDICINE

## 2023-06-20 PROCEDURE — 99214 OFFICE O/P EST MOD 30 MIN: CPT | Performed by: INTERNAL MEDICINE

## 2023-06-20 RX ORDER — PREDNISONE 20 MG/1
20 TABLET ORAL 2 TIMES DAILY
Qty: 10 TABLET | Refills: 0 | Status: SHIPPED | OUTPATIENT
Start: 2023-06-20 | End: 2023-06-25

## 2023-06-20 NOTE — PROGRESS NOTES
1. Have you been to the ER, urgent care clinic since your last visit? Hospitalized since your last visit?no    2. Have you seen or consulted any other health care providers outside of the [address] [place] [address] since your last visit? Include any pap smears or colon screening.  no     Chief Complaint   Patient presents with    Hand Pain    Hypertension    Neck Pain         No data recorded

## 2023-06-20 NOTE — PROGRESS NOTES
Sulma Hernandez is a 56 y.o. male and presents with Hand Pain, Hypertension, and Neck Pain  . Subjective:  Hypertension Review:  The patient has hypertension . Diet and Lifestyle: generally follows a low sodium diet, exercises sporadically  Home BP Monitoring: is not measured at home. Pertinent ROS: taking medications as instructed, no medication side effects noted, no TIA's, no chest pain on exertion, no dyspnea on exertion, no swelling of ankles. Dyslipidemia Review:  Patient presents for evaluation of lipids. Compliance with treatment thus far has been excellent. A repeat fasting lipid profile was done. The patient does not use medications that may worsen dyslipidemias . The patient exercises sporadically. Neck Pain Review:  Patient complains of neck pain. Onset of symptoms was a few weeks ago, gradually worsening since that time. Current symptoms are pain in neck (aching, dull in character; 9/10 in severity), weakness in back. Patient denies numbness, tingling, paresthesias in upper extremities. Patient has weakness, diminished  strength, Radiation of pain: . Patient has had no prior neck problems. Previous treatments include: medication: .        Review of Systems  Constitutional: negative for fevers, chills, anorexia and weight loss  Eyes:   negative for visual disturbance and irritation  ENT:   negative for tinnitus,sore throat,nasal congestion,ear pains. hoarseness  Respiratory:  negative for cough, hemoptysis, dyspnea,wheezing  CV:   negative for chest pain, palpitations, lower extremity edema  GI:   negative for nausea, vomiting, diarrhea, abdominal pain,melena  Endo:               negative for polyuria,polydipsia,polyphagia,heat intolerance  Genitourinary: negative for frequency, dysuria and hematuria  Integument:  negative for rash and pruritus  Hematologic:  negative for easy bruising and gum/nose bleeding  Musculoskel: myalgias, arthralgias,   Neurological:  negative for headaches,

## 2023-06-20 NOTE — PATIENT INSTRUCTIONS
Thank you for enrolling in [address] Please follow the instructions below to securely access your online medical record. [employer] allows you to send messages to your doctor, view your test results, renew your prescriptions, schedule appointments, and more. How Do I Sign Up? In your Internet browser, go to [email or web address]  Click on the Sign Up Now link in the Sign In box. You will see the New Member Sign Up page. Enter your Noiz Analytics Access Code exactly as it appears below. You will not need to use this code after youve completed the sign-up process. If you do not sign up before the expiration date, you must request a new code. Noiz Analytics Access Code: [email or web address]  Expires: 7/15/2023 10:41 AM    Enter your Social Security Number ([id number]) and Date of Birth (mm/dd/yyyy) as indicated and click Submit. You will be taken to the next sign-up page. Create a Noiz Analytics ID. This will be your Noiz Analytics login ID and cannot be changed, so think of one that is secure and easy to remember. Create a No[employer] Analytics password. You can change your password at any time. Enter your Password Reset Question and Answer. This can be used at a later time if you forget your password. Enter your e-mail address. You will receive e-mail notification when new information is available in [address] Click Sign Up. You can now view your medical record. Additional Information  If you have questions, please contact your physician practice where you receive care. Remember, Noiz Analytics is NOT to be used for urgent needs. For medical emergencies, dial 911.

## 2023-06-22 ENCOUNTER — HOSPITAL ENCOUNTER (OUTPATIENT)
Facility: HOSPITAL | Age: 58
Discharge: HOME | End: 2023-06-22
Payer: MEDICAID

## 2023-06-22 DIAGNOSIS — M54.12 CERVICAL RADICULOPATHY: ICD-10-CM

## 2023-06-22 PROCEDURE — 72050 X-RAY EXAM NECK SPINE 4/5VWS: CPT

## 2023-07-10 ENCOUNTER — OFFICE VISIT (OUTPATIENT)
Facility: CLINIC | Age: 58
End: 2023-07-10
Payer: MEDICAID

## 2023-07-10 VITALS
OXYGEN SATURATION: 98 % | DIASTOLIC BLOOD PRESSURE: 84 MMHG | SYSTOLIC BLOOD PRESSURE: 140 MMHG | HEART RATE: 91 BPM | WEIGHT: 158 LBS | RESPIRATION RATE: 16 BRPM | TEMPERATURE: 98 F | HEIGHT: 69 IN | BODY MASS INDEX: 23.4 KG/M2

## 2023-07-10 DIAGNOSIS — E78.00 PURE HYPERCHOLESTEROLEMIA, UNSPECIFIED: ICD-10-CM

## 2023-07-10 DIAGNOSIS — M54.12 CERVICAL RADICULOPATHY: Primary | ICD-10-CM

## 2023-07-10 DIAGNOSIS — I10 ESSENTIAL (PRIMARY) HYPERTENSION: ICD-10-CM

## 2023-07-10 PROCEDURE — 3079F DIAST BP 80-89 MM HG: CPT | Performed by: INTERNAL MEDICINE

## 2023-07-10 PROCEDURE — 99214 OFFICE O/P EST MOD 30 MIN: CPT | Performed by: INTERNAL MEDICINE

## 2023-07-10 PROCEDURE — 3077F SYST BP >= 140 MM HG: CPT | Performed by: INTERNAL MEDICINE

## 2023-10-10 ENCOUNTER — OFFICE VISIT (OUTPATIENT)
Facility: CLINIC | Age: 58
End: 2023-10-10
Payer: MEDICAID

## 2023-10-10 VITALS
WEIGHT: 164 LBS | BODY MASS INDEX: 24.29 KG/M2 | OXYGEN SATURATION: 95 % | HEART RATE: 88 BPM | TEMPERATURE: 98 F | SYSTOLIC BLOOD PRESSURE: 120 MMHG | HEIGHT: 69 IN | DIASTOLIC BLOOD PRESSURE: 70 MMHG | RESPIRATION RATE: 20 BRPM

## 2023-10-10 DIAGNOSIS — I10 ESSENTIAL (PRIMARY) HYPERTENSION: Primary | ICD-10-CM

## 2023-10-10 DIAGNOSIS — N52.9 ERECTILE DISORDER: ICD-10-CM

## 2023-10-10 DIAGNOSIS — E78.00 PURE HYPERCHOLESTEROLEMIA, UNSPECIFIED: ICD-10-CM

## 2023-10-10 PROCEDURE — 99214 OFFICE O/P EST MOD 30 MIN: CPT | Performed by: INTERNAL MEDICINE

## 2023-10-10 PROCEDURE — 3078F DIAST BP <80 MM HG: CPT | Performed by: INTERNAL MEDICINE

## 2023-10-10 PROCEDURE — 3074F SYST BP LT 130 MM HG: CPT | Performed by: INTERNAL MEDICINE

## 2023-10-10 RX ORDER — AMLODIPINE BESYLATE 2.5 MG/1
2.5 TABLET ORAL DAILY
Qty: 30 TABLET | Status: CANCELLED | OUTPATIENT
Start: 2023-10-10

## 2023-10-10 RX ORDER — SILDENAFIL 100 MG/1
TABLET, FILM COATED ORAL
Qty: 30 TABLET | Refills: 12 | Status: SHIPPED | OUTPATIENT
Start: 2023-10-10

## 2023-10-10 RX ORDER — ROSUVASTATIN CALCIUM 10 MG/1
10 TABLET, COATED ORAL NIGHTLY
Qty: 30 TABLET | Status: CANCELLED | OUTPATIENT
Start: 2023-10-10

## 2023-10-10 RX ORDER — CLOBETASOL PROPIONATE 0.5 MG/G
OINTMENT TOPICAL 2 TIMES DAILY
Status: CANCELLED | OUTPATIENT
Start: 2023-10-10

## 2023-10-10 SDOH — ECONOMIC STABILITY: FOOD INSECURITY: WITHIN THE PAST 12 MONTHS, THE FOOD YOU BOUGHT JUST DIDN'T LAST AND YOU DIDN'T HAVE MONEY TO GET MORE.: NEVER TRUE

## 2023-10-10 SDOH — ECONOMIC STABILITY: HOUSING INSECURITY
IN THE LAST 12 MONTHS, WAS THERE A TIME WHEN YOU DID NOT HAVE A STEADY PLACE TO SLEEP OR SLEPT IN A SHELTER (INCLUDING NOW)?: NO

## 2023-10-10 SDOH — ECONOMIC STABILITY: FOOD INSECURITY: WITHIN THE PAST 12 MONTHS, YOU WORRIED THAT YOUR FOOD WOULD RUN OUT BEFORE YOU GOT MONEY TO BUY MORE.: NEVER TRUE

## 2023-10-10 SDOH — ECONOMIC STABILITY: INCOME INSECURITY: HOW HARD IS IT FOR YOU TO PAY FOR THE VERY BASICS LIKE FOOD, HOUSING, MEDICAL CARE, AND HEATING?: SOMEWHAT HARD

## 2023-10-10 ASSESSMENT — PATIENT HEALTH QUESTIONNAIRE - PHQ9
7. TROUBLE CONCENTRATING ON THINGS, SUCH AS READING THE NEWSPAPER OR WATCHING TELEVISION: 0
2. FEELING DOWN, DEPRESSED OR HOPELESS: 0
DEPRESSION UNABLE TO ASSESS: FUNCTIONAL CAPACITY MOTIVATION LIMITS ACCURACY
10. IF YOU CHECKED OFF ANY PROBLEMS, HOW DIFFICULT HAVE THESE PROBLEMS MADE IT FOR YOU TO DO YOUR WORK, TAKE CARE OF THINGS AT HOME, OR GET ALONG WITH OTHER PEOPLE: 0
SUM OF ALL RESPONSES TO PHQ9 QUESTIONS 1 & 2: 0
DEPRESSION UNABLE TO ASSESS: FUNCTIONAL CAPACITY MOTIVATION LIMITS ACCURACY
SUM OF ALL RESPONSES TO PHQ QUESTIONS 1-9: 0
SUM OF ALL RESPONSES TO PHQ QUESTIONS 1-9: 0
3. TROUBLE FALLING OR STAYING ASLEEP: 0
8. MOVING OR SPEAKING SO SLOWLY THAT OTHER PEOPLE COULD HAVE NOTICED. OR THE OPPOSITE, BEING SO FIGETY OR RESTLESS THAT YOU HAVE BEEN MOVING AROUND A LOT MORE THAN USUAL: 0
10. IF YOU CHECKED OFF ANY PROBLEMS, HOW DIFFICULT HAVE THESE PROBLEMS MADE IT FOR YOU TO DO YOUR WORK, TAKE CARE OF THINGS AT HOME, OR GET ALONG WITH OTHER PEOPLE: 0
6. FEELING BAD ABOUT YOURSELF - OR THAT YOU ARE A FAILURE OR HAVE LET YOURSELF OR YOUR FAMILY DOWN: 0
SUM OF ALL RESPONSES TO PHQ QUESTIONS 1-9: 0
4. FEELING TIRED OR HAVING LITTLE ENERGY: 0
SUM OF ALL RESPONSES TO PHQ QUESTIONS 1-9: 0
4. FEELING TIRED OR HAVING LITTLE ENERGY: 0
SUM OF ALL RESPONSES TO PHQ QUESTIONS 1-9: 0
5. POOR APPETITE OR OVEREATING: 0
7. TROUBLE CONCENTRATING ON THINGS, SUCH AS READING THE NEWSPAPER OR WATCHING TELEVISION: 0
8. MOVING OR SPEAKING SO SLOWLY THAT OTHER PEOPLE COULD HAVE NOTICED. OR THE OPPOSITE, BEING SO FIGETY OR RESTLESS THAT YOU HAVE BEEN MOVING AROUND A LOT MORE THAN USUAL: 0
2. FEELING DOWN, DEPRESSED OR HOPELESS: 0
9. THOUGHTS THAT YOU WOULD BE BETTER OFF DEAD, OR OF HURTING YOURSELF: 0
5. POOR APPETITE OR OVEREATING: 0
9. THOUGHTS THAT YOU WOULD BE BETTER OFF DEAD, OR OF HURTING YOURSELF: 0
SUM OF ALL RESPONSES TO PHQ QUESTIONS 1-9: 0
6. FEELING BAD ABOUT YOURSELF - OR THAT YOU ARE A FAILURE OR HAVE LET YOURSELF OR YOUR FAMILY DOWN: 0
1. LITTLE INTEREST OR PLEASURE IN DOING THINGS: 0
SUM OF ALL RESPONSES TO PHQ QUESTIONS 1-9: 0
SUM OF ALL RESPONSES TO PHQ QUESTIONS 1-9: 0
1. LITTLE INTEREST OR PLEASURE IN DOING THINGS: 0
SUM OF ALL RESPONSES TO PHQ9 QUESTIONS 1 & 2: 0
3. TROUBLE FALLING OR STAYING ASLEEP: 0

## 2023-10-10 ASSESSMENT — ANXIETY QUESTIONNAIRES
2. NOT BEING ABLE TO STOP OR CONTROL WORRYING: 0
3. WORRYING TOO MUCH ABOUT DIFFERENT THINGS: 0
GAD7 TOTAL SCORE: 0
7. FEELING AFRAID AS IF SOMETHING AWFUL MIGHT HAPPEN: 0
4. TROUBLE RELAXING: 0
1. FEELING NERVOUS, ANXIOUS, OR ON EDGE: 0
5. BEING SO RESTLESS THAT IT IS HARD TO SIT STILL: 0
6. BECOMING EASILY ANNOYED OR IRRITABLE: 0

## 2023-10-10 NOTE — PROGRESS NOTES
1. Have you been to the ER, urgent care clinic since your last visit? Hospitalized since your last visit?no    2. Have you seen or consulted any other health care providers outside of the [address] [place] [address] since your last visit? Include any pap smears or colon screening.  no     Chief Complaint   Patient presents with    Shoulder Pain    Back Pain         PHQ-9 Total Score: 0 (10/10/2023 12:10 PM)  Thoughts that you would be better off dead, or of hurting yourself in some way: 0 (10/10/2023 12:10 PM)

## 2023-10-10 NOTE — PROGRESS NOTES
Keri Sandoval is a 66 y.o. male and presents with Shoulder Pain, Back Pain, and Hypertension  . Subjective:  Hypertension Review:  The patient has hypertension . Diet and Lifestyle: generally follows a low sodium diet, exercises sporadically  Home BP Monitoring: is not measured at home. Pertinent ROS: taking medications as instructed, no medication side effects noted, no TIA's, no chest pain on exertion, no dyspnea on exertion, no swelling of ankles. Dyslipidemia Review:  Patient presents for evaluation of lipids. Compliance with treatment thus far has been excellent. A repeat fasting lipid profile was done. The patient does not use medications that may worsen dyslipidemias . The patient exercises sporadically. Erectile dysfunction Review[de-identified]  Patient complains of difficulty in maintaining an adequate erection. He states that his present medication is helping thus far. Review of Systems  Constitutional: negative for fevers, chills, anorexia and weight loss  Eyes:   negative for visual disturbance and irritation  ENT:   negative for tinnitus,sore throat,nasal congestion,ear pains. hoarseness  Respiratory:  negative for cough, hemoptysis, dyspnea,wheezing  CV:   negative for chest pain, palpitations, lower extremity edema  GI:   negative for nausea, vomiting, diarrhea, abdominal pain,melena  Endo:               negative for polyuria,polydipsia,polyphagia,heat intolerance  Genitourinary: negative for frequency, dysuria and hematuria  Integument:  negative for rash and pruritus  Hematologic:  negative for easy bruising and gum/nose bleeding  Musculoskel: negative for myalgias, arthralgias, back pain, muscle weakness, joint pain  Neurological:  negative for headaches, dizziness, vertigo, memory problems and gait   Behavl/Psych: negative for feelings of anxiety, depression, mood changes    Past Medical History:   Diagnosis Date    Anxiety     anxiety    Chronic pain     back    Gastrointestinal

## 2023-10-24 ENCOUNTER — OFFICE VISIT (OUTPATIENT)
Facility: CLINIC | Age: 58
End: 2023-10-24
Payer: MEDICAID

## 2023-10-24 VITALS
WEIGHT: 165 LBS | DIASTOLIC BLOOD PRESSURE: 95 MMHG | RESPIRATION RATE: 16 BRPM | HEART RATE: 70 BPM | TEMPERATURE: 98.4 F | SYSTOLIC BLOOD PRESSURE: 149 MMHG | HEIGHT: 69 IN | OXYGEN SATURATION: 100 % | BODY MASS INDEX: 24.44 KG/M2

## 2023-10-24 DIAGNOSIS — M12.812 ROTATOR CUFF ARTHROPATHY, LEFT: Primary | ICD-10-CM

## 2023-10-24 DIAGNOSIS — E78.00 PURE HYPERCHOLESTEROLEMIA, UNSPECIFIED: ICD-10-CM

## 2023-10-24 DIAGNOSIS — I10 ESSENTIAL (PRIMARY) HYPERTENSION: ICD-10-CM

## 2023-10-24 PROCEDURE — 99214 OFFICE O/P EST MOD 30 MIN: CPT | Performed by: INTERNAL MEDICINE

## 2023-10-24 PROCEDURE — 3077F SYST BP >= 140 MM HG: CPT | Performed by: INTERNAL MEDICINE

## 2023-10-24 PROCEDURE — 3080F DIAST BP >= 90 MM HG: CPT | Performed by: INTERNAL MEDICINE

## 2023-10-24 PROCEDURE — 20605 DRAIN/INJ JOINT/BURSA W/O US: CPT | Performed by: INTERNAL MEDICINE

## 2023-10-24 RX ORDER — LIDOCAINE HYDROCHLORIDE 10 MG/ML
5 INJECTION, SOLUTION INFILTRATION; PERINEURAL ONCE
Status: COMPLETED | OUTPATIENT
Start: 2023-10-24 | End: 2023-10-24

## 2023-10-24 RX ORDER — TRIAMCINOLONE ACETONIDE 40 MG/ML
40 INJECTION, SUSPENSION INTRA-ARTICULAR; INTRAMUSCULAR ONCE
Status: COMPLETED | OUTPATIENT
Start: 2023-10-24 | End: 2023-10-24

## 2023-10-24 RX ADMIN — TRIAMCINOLONE ACETONIDE 40 MG: 40 INJECTION, SUSPENSION INTRA-ARTICULAR; INTRAMUSCULAR at 10:50

## 2023-10-24 RX ADMIN — LIDOCAINE HYDROCHLORIDE 5 ML: 10 INJECTION, SOLUTION INFILTRATION; PERINEURAL at 10:53

## 2023-10-24 ASSESSMENT — PATIENT HEALTH QUESTIONNAIRE - PHQ9
9. THOUGHTS THAT YOU WOULD BE BETTER OFF DEAD, OR OF HURTING YOURSELF: 0
SUM OF ALL RESPONSES TO PHQ QUESTIONS 1-9: 2
SUM OF ALL RESPONSES TO PHQ9 QUESTIONS 1 & 2: 0
SUM OF ALL RESPONSES TO PHQ QUESTIONS 1-9: 2
SUM OF ALL RESPONSES TO PHQ9 QUESTIONS 1 & 2: 0
10. IF YOU CHECKED OFF ANY PROBLEMS, HOW DIFFICULT HAVE THESE PROBLEMS MADE IT FOR YOU TO DO YOUR WORK, TAKE CARE OF THINGS AT HOME, OR GET ALONG WITH OTHER PEOPLE: 0
SUM OF ALL RESPONSES TO PHQ QUESTIONS 1-9: 0
1. LITTLE INTEREST OR PLEASURE IN DOING THINGS: 0
SUM OF ALL RESPONSES TO PHQ QUESTIONS 1-9: 2
2. FEELING DOWN, DEPRESSED OR HOPELESS: 0
5. POOR APPETITE OR OVEREATING: 0
8. MOVING OR SPEAKING SO SLOWLY THAT OTHER PEOPLE COULD HAVE NOTICED. OR THE OPPOSITE, BEING SO FIGETY OR RESTLESS THAT YOU HAVE BEEN MOVING AROUND A LOT MORE THAN USUAL: 0
SUM OF ALL RESPONSES TO PHQ QUESTIONS 1-9: 2
SUM OF ALL RESPONSES TO PHQ QUESTIONS 1-9: 0
SUM OF ALL RESPONSES TO PHQ QUESTIONS 1-9: 0
7. TROUBLE CONCENTRATING ON THINGS, SUCH AS READING THE NEWSPAPER OR WATCHING TELEVISION: 0
SUM OF ALL RESPONSES TO PHQ QUESTIONS 1-9: 0
4. FEELING TIRED OR HAVING LITTLE ENERGY: 0
6. FEELING BAD ABOUT YOURSELF - OR THAT YOU ARE A FAILURE OR HAVE LET YOURSELF OR YOUR FAMILY DOWN: 0
2. FEELING DOWN, DEPRESSED OR HOPELESS: 0
3. TROUBLE FALLING OR STAYING ASLEEP: 2
1. LITTLE INTEREST OR PLEASURE IN DOING THINGS: 0

## 2023-10-24 NOTE — PROGRESS NOTES
Melly Huston is a 66 y.o. male and presents with Injections and Shoulder Pain  . Subjective:  Shoulder Pain Review:  Patient complains of left side shoulder pain. The symptoms began several weeks ago Course of symptoms since onset has been gradually worsening. Pain is described as overall severity = moderate. Symptoms were incited by no known event. Patient denies N/A. Therapy to date includes OTC analgesics: effective. Hypertension Review:  The patient has essential hypertension  Diet and Lifestyle: generally follows a  low sodium diet, exercises sporadically  Home BP Monitoring: is not measured at home. Pertinent ROS: taking medications as instructed, no medication side effects noted, no TIA's, no chest pain on exertion, no dyspnea on exertion, no swelling of ankles. Dyslipidemia Review:  Patient presents for evaluation of lipids. Compliance with treatment thus far has been excellent. A repeat fasting lipid profile was not done. The patient does not use medications that may worsen dyslipidemias (corticosteroids, progestins, anabolic steroids, amiodarone, cyclosporine, olanzapine). The patient exercises some      Review of Systems  Constitutional: negative for fevers, chills, anorexia and weight loss  Eyes:   negative for visual disturbance and irritation  ENT:   negative for tinnitus,sore throat,nasal congestion,ear pains. hoarseness  Respiratory:  negative for cough, hemoptysis, dyspnea,wheezing  CV:   negative for chest pain, palpitations, lower extremity edema  GI:   negative for nausea, vomiting, diarrhea, abdominal pain,melena  Endo:               negative for polyuria,polydipsia,polyphagia,heat intolerance  Genitourinary: negative for frequency, dysuria and hematuria  Integument:  negative for rash and pruritus  Hematologic:  negative for easy bruising and gum/nose bleeding  Musculoskel: myalgias, arthralgias, joint pain  Neurological:  negative for headaches, dizziness, vertigo, memory

## 2023-10-24 NOTE — PROGRESS NOTES
Chief Complaint   Patient presents with    Injections    Shoulder Pain     1. Have you been to the ER, urgent care clinic since your last visit? Hospitalized since your last visit? No    2. Have you seen or consulted any other health care providers outside of the [address] [place] [address] Avenue since your last visit? Include any pap smears or colon screening. No    Pt complains of pain in left shoulder 10/10.

## 2023-11-21 ENCOUNTER — OFFICE VISIT (OUTPATIENT)
Facility: CLINIC | Age: 58
End: 2023-11-21
Payer: MEDICAID

## 2023-11-21 VITALS
HEIGHT: 69 IN | TEMPERATURE: 98 F | BODY MASS INDEX: 24.29 KG/M2 | OXYGEN SATURATION: 98 % | HEART RATE: 88 BPM | DIASTOLIC BLOOD PRESSURE: 70 MMHG | RESPIRATION RATE: 20 BRPM | WEIGHT: 164 LBS | SYSTOLIC BLOOD PRESSURE: 138 MMHG

## 2023-11-21 DIAGNOSIS — S43.422D SPRAIN OF LEFT ROTATOR CUFF CAPSULE, SUBSEQUENT ENCOUNTER: ICD-10-CM

## 2023-11-21 DIAGNOSIS — G72.0 STATIN MYOPATHY: Primary | ICD-10-CM

## 2023-11-21 DIAGNOSIS — I10 ESSENTIAL (PRIMARY) HYPERTENSION: ICD-10-CM

## 2023-11-21 DIAGNOSIS — E78.00 PURE HYPERCHOLESTEROLEMIA, UNSPECIFIED: ICD-10-CM

## 2023-11-21 DIAGNOSIS — T46.6X5A STATIN MYOPATHY: Primary | ICD-10-CM

## 2023-11-21 PROCEDURE — 3075F SYST BP GE 130 - 139MM HG: CPT | Performed by: INTERNAL MEDICINE

## 2023-11-21 PROCEDURE — 99214 OFFICE O/P EST MOD 30 MIN: CPT | Performed by: INTERNAL MEDICINE

## 2023-11-21 PROCEDURE — 3078F DIAST BP <80 MM HG: CPT | Performed by: INTERNAL MEDICINE

## 2023-11-21 SDOH — ECONOMIC STABILITY: FOOD INSECURITY: WITHIN THE PAST 12 MONTHS, THE FOOD YOU BOUGHT JUST DIDN'T LAST AND YOU DIDN'T HAVE MONEY TO GET MORE.: NEVER TRUE

## 2023-11-21 SDOH — ECONOMIC STABILITY: FOOD INSECURITY: WITHIN THE PAST 12 MONTHS, YOU WORRIED THAT YOUR FOOD WOULD RUN OUT BEFORE YOU GOT MONEY TO BUY MORE.: NEVER TRUE

## 2023-11-21 SDOH — ECONOMIC STABILITY: INCOME INSECURITY: HOW HARD IS IT FOR YOU TO PAY FOR THE VERY BASICS LIKE FOOD, HOUSING, MEDICAL CARE, AND HEATING?: SOMEWHAT HARD

## 2023-11-21 ASSESSMENT — ANXIETY QUESTIONNAIRES
1. FEELING NERVOUS, ANXIOUS, OR ON EDGE: 0
2. NOT BEING ABLE TO STOP OR CONTROL WORRYING: 0
3. WORRYING TOO MUCH ABOUT DIFFERENT THINGS: 0
4. TROUBLE RELAXING: 0
6. BECOMING EASILY ANNOYED OR IRRITABLE: 0
IF YOU CHECKED OFF ANY PROBLEMS ON THIS QUESTIONNAIRE, HOW DIFFICULT HAVE THESE PROBLEMS MADE IT FOR YOU TO DO YOUR WORK, TAKE CARE OF THINGS AT HOME, OR GET ALONG WITH OTHER PEOPLE: NOT DIFFICULT AT ALL
5. BEING SO RESTLESS THAT IT IS HARD TO SIT STILL: 0

## 2023-11-21 ASSESSMENT — PATIENT HEALTH QUESTIONNAIRE - PHQ9
5. POOR APPETITE OR OVEREATING: 0
4. FEELING TIRED OR HAVING LITTLE ENERGY: 0
3. TROUBLE FALLING OR STAYING ASLEEP: 0
8. MOVING OR SPEAKING SO SLOWLY THAT OTHER PEOPLE COULD HAVE NOTICED. OR THE OPPOSITE, BEING SO FIGETY OR RESTLESS THAT YOU HAVE BEEN MOVING AROUND A LOT MORE THAN USUAL: 0
DEPRESSION UNABLE TO ASSESS: FUNCTIONAL CAPACITY MOTIVATION LIMITS ACCURACY
9. THOUGHTS THAT YOU WOULD BE BETTER OFF DEAD, OR OF HURTING YOURSELF: 0
SUM OF ALL RESPONSES TO PHQ QUESTIONS 1-9: 0
10. IF YOU CHECKED OFF ANY PROBLEMS, HOW DIFFICULT HAVE THESE PROBLEMS MADE IT FOR YOU TO DO YOUR WORK, TAKE CARE OF THINGS AT HOME, OR GET ALONG WITH OTHER PEOPLE: 0
6. FEELING BAD ABOUT YOURSELF - OR THAT YOU ARE A FAILURE OR HAVE LET YOURSELF OR YOUR FAMILY DOWN: 0
SUM OF ALL RESPONSES TO PHQ QUESTIONS 1-9: 0
SUM OF ALL RESPONSES TO PHQ9 QUESTIONS 1 & 2: 0
1. LITTLE INTEREST OR PLEASURE IN DOING THINGS: 0
SUM OF ALL RESPONSES TO PHQ QUESTIONS 1-9: 0
7. TROUBLE CONCENTRATING ON THINGS, SUCH AS READING THE NEWSPAPER OR WATCHING TELEVISION: 0
SUM OF ALL RESPONSES TO PHQ QUESTIONS 1-9: 0
2. FEELING DOWN, DEPRESSED OR HOPELESS: 0

## 2023-11-21 ASSESSMENT — COLUMBIA-SUICIDE SEVERITY RATING SCALE - C-SSRS
4. HAVE YOU HAD THESE THOUGHTS AND HAD SOME INTENTION OF ACTING ON THEM?: NO
3. HAVE YOU BEEN THINKING ABOUT HOW YOU MIGHT KILL YOURSELF?: NO
5. HAVE YOU STARTED TO WORK OUT OR WORKED OUT THE DETAILS OF HOW TO KILL YOURSELF? DO YOU INTEND TO CARRY OUT THIS PLAN?: NO
7. DID THIS OCCUR IN THE LAST THREE MONTHS: NO

## 2024-01-22 ENCOUNTER — OFFICE VISIT (OUTPATIENT)
Facility: CLINIC | Age: 59
End: 2024-01-22
Payer: MEDICAID

## 2024-01-22 ENCOUNTER — HOSPITAL ENCOUNTER (OUTPATIENT)
Facility: HOSPITAL | Age: 59
Discharge: HOME | End: 2024-01-25
Attending: INTERNAL MEDICINE
Payer: MEDICAID

## 2024-01-22 VITALS
RESPIRATION RATE: 16 BRPM | TEMPERATURE: 98 F | HEART RATE: 89 BPM | BODY MASS INDEX: 25.33 KG/M2 | WEIGHT: 171 LBS | HEIGHT: 69 IN | OXYGEN SATURATION: 97 % | DIASTOLIC BLOOD PRESSURE: 70 MMHG | SYSTOLIC BLOOD PRESSURE: 110 MMHG

## 2024-01-22 DIAGNOSIS — I10 ESSENTIAL (PRIMARY) HYPERTENSION: ICD-10-CM

## 2024-01-22 DIAGNOSIS — M51.06 LUMBAR DISC DISORDER WITH MYELOPATHY: Primary | ICD-10-CM

## 2024-01-22 DIAGNOSIS — M51.06 LUMBAR DISC DISORDER WITH MYELOPATHY: ICD-10-CM

## 2024-01-22 PROCEDURE — 3078F DIAST BP <80 MM HG: CPT | Performed by: INTERNAL MEDICINE

## 2024-01-22 PROCEDURE — 72100 X-RAY EXAM L-S SPINE 2/3 VWS: CPT

## 2024-01-22 PROCEDURE — 99214 OFFICE O/P EST MOD 30 MIN: CPT | Performed by: INTERNAL MEDICINE

## 2024-01-22 PROCEDURE — 3074F SYST BP LT 130 MM HG: CPT | Performed by: INTERNAL MEDICINE

## 2024-01-22 RX ORDER — AMLODIPINE BESYLATE 2.5 MG/1
2.5 TABLET ORAL DAILY
Qty: 90 TABLET | OUTPATIENT
Start: 2024-01-22

## 2024-01-22 RX ORDER — DICLOFENAC SODIUM 75 MG/1
75 TABLET, DELAYED RELEASE ORAL 2 TIMES DAILY
Qty: 60 TABLET | Refills: 3 | Status: SHIPPED | OUTPATIENT
Start: 2024-01-22

## 2024-01-22 RX ORDER — AMLODIPINE BESYLATE 2.5 MG/1
2.5 TABLET ORAL DAILY
Qty: 30 TABLET | Refills: 12 | Status: SHIPPED | OUTPATIENT
Start: 2024-01-22

## 2024-01-22 RX ORDER — PREDNISONE 5 MG/1
TABLET ORAL
Qty: 21 EACH | Refills: 1 | Status: SHIPPED | OUTPATIENT
Start: 2024-01-22

## 2024-01-22 ASSESSMENT — PATIENT HEALTH QUESTIONNAIRE - PHQ9
SUM OF ALL RESPONSES TO PHQ QUESTIONS 1-9: 0
3. TROUBLE FALLING OR STAYING ASLEEP: 3
SUM OF ALL RESPONSES TO PHQ QUESTIONS 1-9: 0
SUM OF ALL RESPONSES TO PHQ QUESTIONS 1-9: 0
5. POOR APPETITE OR OVEREATING: 0
SUM OF ALL RESPONSES TO PHQ QUESTIONS 1-9: 10
4. FEELING TIRED OR HAVING LITTLE ENERGY: 2
8. MOVING OR SPEAKING SO SLOWLY THAT OTHER PEOPLE COULD HAVE NOTICED. OR THE OPPOSITE, BEING SO FIGETY OR RESTLESS THAT YOU HAVE BEEN MOVING AROUND A LOT MORE THAN USUAL: 0
6. FEELING BAD ABOUT YOURSELF - OR THAT YOU ARE A FAILURE OR HAVE LET YOURSELF OR YOUR FAMILY DOWN: 0
9. THOUGHTS THAT YOU WOULD BE BETTER OFF DEAD, OR OF HURTING YOURSELF: 0
2. FEELING DOWN, DEPRESSED OR HOPELESS: 0
7. TROUBLE CONCENTRATING ON THINGS, SUCH AS READING THE NEWSPAPER OR WATCHING TELEVISION: 1
SUM OF ALL RESPONSES TO PHQ QUESTIONS 1-9: 10
SUM OF ALL RESPONSES TO PHQ9 QUESTIONS 1 & 2: 4
SUM OF ALL RESPONSES TO PHQ QUESTIONS 1-9: 10
SUM OF ALL RESPONSES TO PHQ QUESTIONS 1-9: 0
2. FEELING DOWN, DEPRESSED OR HOPELESS: 3
10. IF YOU CHECKED OFF ANY PROBLEMS, HOW DIFFICULT HAVE THESE PROBLEMS MADE IT FOR YOU TO DO YOUR WORK, TAKE CARE OF THINGS AT HOME, OR GET ALONG WITH OTHER PEOPLE: 1
SUM OF ALL RESPONSES TO PHQ QUESTIONS 1-9: 10
1. LITTLE INTEREST OR PLEASURE IN DOING THINGS: 1

## 2024-01-22 NOTE — PATIENT INSTRUCTIONS
Thank you for enrolling in [facility]. Please follow the instructions below to securely access your online medical record. [employer] allows you to send messages to your doctor, view your test results, renew your prescriptions, schedule appointments, and more.     How Do I Sign Up?  In your Internet browser, go to [email or web address]  Click on the Sign Up Now link in the Sign In box. You will see the New Member Sign Up page.  Enter your [employer] Access Code exactly as it appears below. You will not need to use this code after you’ve completed the sign-up process. If you do not sign up before the expiration date, you must request a new code.  Onehub Access Code: Activation code not generated  Current Onehub Status: Active    Enter your Social Security Number ([id number]) and Date of Birth (mm/dd/yyyy) as indicated and click Submit. You will be taken to the next sign-up page.  Create a Onehub ID. This will be your Onehub login ID and cannot be changed, so think of one that is secure and easy to remember.  Create a Onehub password. You can change your password at any time.  Enter your Password Reset Question and Answer. This can be used at a later time if you forget your password.   Enter your e-mail address. You will receive e-mail notification when new information is available in Onehub.  Click Sign Up. You can now view your medical record.     Additional Information  If you have questions, please contact your physician practice where you receive care. Remember, Onehub is NOT to be used for urgent needs. For medical emergencies, dial 911.

## 2024-01-22 NOTE — PROGRESS NOTES
1. Have you been to the ER, urgent care clinic since your last visit?  Hospitalized since your last visit?No    2. Have you seen or consulted any other health care providers outside of the [facility] since your last visit?  Include any pap smears or colon screening. No     Chief Complaint   Patient presents with    Hypertension         PHQ-9 Total Score: 10 (1/22/2024 10:45 AM)  Thoughts that you would be better off dead, or of hurting yourself in some way: 0 (1/22/2024 10:45 AM)

## 2024-01-22 NOTE — PROGRESS NOTES
Brayden Monzon is a 58 y.o. male and presents with Hypertension  .  Subjective:    Back Pain Review:  Patient presents for evaluation of low back problems.  Symptoms have been present for  weeks and include pain in lower back (dull, moderate in character; 6/10 in severity). Initial inciting event: unknown. Symptoms are worst: at times. Alleviating factors identifiable by patient are lying flat, medication . Exacerbating factors identifiable by patient are bending forwards, bending backwards. Treatments so far initiated by patient: medication Previous lower back problems: reported. Previous workup: none.     Hypertension Review:  The patient has essential hypertension  Diet and Lifestyle: generally follows a  low sodium diet, exercises sporadically  Home BP Monitoring: is not measured at home.  Pertinent ROS: taking medications as instructed, no medication side effects noted, no TIA's, no chest pain on exertion, no dyspnea on exertion, no swelling of ankles.           Review of Systems  Constitutional: negative for fevers, chills, anorexia and weight loss  Eyes:   negative for visual disturbance and irritation  ENT:   negative for tinnitus,sore throat,nasal congestion,ear pains.hoarseness  Respiratory:  negative for cough, hemoptysis, dyspnea,wheezing  CV:   negative for chest pain, palpitations, lower extremity edema  GI:   negative for nausea, vomiting, diarrhea, abdominal pain,melena  Endo:               negative for polyuria,polydipsia,polyphagia,heat intolerance  Genitourinary: negative for frequency, dysuria and hematuria  Integument:  negative for rash and pruritus  Hematologic:  negative for easy bruising and gum/nose bleeding  Musculoskel:myalgias, arthralgias, back pain,  joint pain  Neurological:  negative for headaches, dizziness, vertigo, memory problems and gait   Behavl/Psych: negative for feelings of anxiety, depression, mood changes    Past Medical History:   Diagnosis Date    Anxiety     anxiety

## 2024-01-29 ENCOUNTER — OFFICE VISIT (OUTPATIENT)
Facility: CLINIC | Age: 59
End: 2024-01-29
Payer: MEDICAID

## 2024-01-29 VITALS
BODY MASS INDEX: 24.44 KG/M2 | WEIGHT: 165 LBS | OXYGEN SATURATION: 97 % | RESPIRATION RATE: 16 BRPM | HEART RATE: 82 BPM | TEMPERATURE: 98 F | HEIGHT: 69 IN | SYSTOLIC BLOOD PRESSURE: 110 MMHG | DIASTOLIC BLOOD PRESSURE: 74 MMHG

## 2024-01-29 DIAGNOSIS — E78.00 PURE HYPERCHOLESTEROLEMIA, UNSPECIFIED: ICD-10-CM

## 2024-01-29 DIAGNOSIS — I10 ESSENTIAL (PRIMARY) HYPERTENSION: ICD-10-CM

## 2024-01-29 DIAGNOSIS — M54.16 LUMBAR RADICULOPATHY: Primary | ICD-10-CM

## 2024-01-29 PROCEDURE — 3078F DIAST BP <80 MM HG: CPT | Performed by: INTERNAL MEDICINE

## 2024-01-29 PROCEDURE — 99214 OFFICE O/P EST MOD 30 MIN: CPT | Performed by: INTERNAL MEDICINE

## 2024-01-29 PROCEDURE — 3074F SYST BP LT 130 MM HG: CPT | Performed by: INTERNAL MEDICINE

## 2024-01-29 RX ORDER — METHOCARBAMOL 750 MG/1
TABLET, FILM COATED ORAL
Qty: 90 TABLET | Refills: 3 | Status: SHIPPED | OUTPATIENT
Start: 2024-01-29

## 2024-01-29 ASSESSMENT — PATIENT HEALTH QUESTIONNAIRE - PHQ9
SUM OF ALL RESPONSES TO PHQ QUESTIONS 1-9: 0
SUM OF ALL RESPONSES TO PHQ QUESTIONS 1-9: 0
1. LITTLE INTEREST OR PLEASURE IN DOING THINGS: 0
2. FEELING DOWN, DEPRESSED OR HOPELESS: 0
SUM OF ALL RESPONSES TO PHQ QUESTIONS 1-9: 0
SUM OF ALL RESPONSES TO PHQ9 QUESTIONS 1 & 2: 0
SUM OF ALL RESPONSES TO PHQ QUESTIONS 1-9: 0

## 2024-01-29 NOTE — PATIENT INSTRUCTIONS
this instruction, always ask your healthcare professional. [employer], [employer] disclaims any warranty or liability for your use of this information.           A Healthy Heart: Care Instructions  Your Care Instructions     Coronary artery disease, also called heart disease, occurs when a substance called plaque builds up in the vessels that supply oxygen-rich blood to your heart muscle. This can narrow the blood vessels and reduce blood flow. A heart attack happens when blood flow is completely blocked. A high-fat diet, smoking, and other factors increase the risk of heart disease.  Your doctor has found that you have a chance of having heart disease. You can do lots of things to keep your heart healthy. It may not be easy, but you can change your diet, exercise more, and quit smoking. These steps really work to lower your chance of heart disease.  Follow-up care is a key part of your treatment and safety. Be sure to make and go to all appointments, and call your doctor if you are having problems. It's also a good idea to know your test results and keep a list of the medicines you take.  How can you care for yourself at home?  Diet    Use less salt when you cook and eat. This helps lower your blood pressure. Taste food before salting. Add only a little salt when you think you need it. With time, your taste buds will adjust to less salt.     Eat fewer snack items, fast foods, canned soups, and other high-salt, high-fat, processed foods.     Read food labels and try to avoid saturated and trans fats. They increase your risk of heart disease by raising cholesterol levels.     Limit the amount of solid fat-butter, margarine, and shortening-you eat. Use olive, peanut, or canola oil when you cook. Bake, broil, and steam foods instead of frying them.     Eat a variety of fruit and vegetables every day. Dark green, deep orange, red, or yellow fruits and vegetables are especially good for you. Examples include spinach,

## 2024-01-29 NOTE — PROGRESS NOTES
Brayden Monzon is a 58 y.o. male and presents with Back Pain and Medicare AWV (Xray results)           Subjective   Back Pain Review:  Patient presents for evaluation of low back problems.  Symptoms have been present for  weeks and include pain in lower back (dull, moderate in character; 7/10 in severity).he has had a recurrent fall. Symptoms are worst: at times. Alleviating factors identifiable by patient are lying flat, medication . Exacerbating factors identifiable by patient are bending forwards, bending backwards. Treatments so far initiated by patient: medication Previous lower back problems: reported. Previous workup:xray lumbar spine.     Hypertension Review:  The patient has essential hypertension  Diet and Lifestyle: generally follows a  low sodium diet, exercises sporadically  Home BP Monitoring: is not measured at home.  Pertinent ROS: taking medications     Patient's complete Health Risk Assessment and screening values have been reviewed and are found in Flowsheets. The following problems were reviewed today and where indicated follow up appointments were made and/or referrals ordered.    Positive Risk Factor Screenings with Interventions:          Drug Use:   Substance and Sexual Activity   Drug Use Yes    Frequency: 7.0 times per week    Types: Marijuana (Weed)     Interventions:                               Objective   Vitals:    01/29/24 0944   BP: 110/74   Pulse: 82   Resp: 16   Temp: 98 °F (36.7 °C)   TempSrc: Oral   SpO2: 97%   Weight: 74.8 kg (165 lb)   Height: 1.753 m (5' 9\")      Body mass index is 24.37 kg/m².        General Appearance: alert and oriented to person, place and time, well developed and well- nourished, in no acute distress  Skin: warm and dry, no rash or erythema  Head: normocephalic and atraumatic  Eyes: pupils equal, round, and reactive to light, extraocular eye movements intact, conjunctivae normal  ENT: tympanic membrane, external ear and ear canal normal bilaterally,

## 2024-01-29 NOTE — PROGRESS NOTES
1. Have you been to the ER, urgent care clinic since your last visit?  Hospitalized since your last visit?No    2. Have you seen or consulted any other health care providers outside of the [facility] since your last visit?  Include any pap smears or colon screening. No     Chief Complaint   Patient presents with    Back Pain    Medicare AWV     Xray results         PHQ-9 Total Score: 0 (1/29/2024  9:39 AM)

## 2024-02-04 ENCOUNTER — HOSPITAL ENCOUNTER (EMERGENCY)
Facility: HOSPITAL | Age: 59
Discharge: HOME | End: 2024-02-04
Attending: EMERGENCY MEDICINE
Payer: MEDICAID

## 2024-02-04 ENCOUNTER — APPOINTMENT (OUTPATIENT)
Facility: HOSPITAL | Age: 59
End: 2024-02-04
Payer: MEDICAID

## 2024-02-04 VITALS
DIASTOLIC BLOOD PRESSURE: 80 MMHG | SYSTOLIC BLOOD PRESSURE: 134 MMHG | RESPIRATION RATE: 15 BRPM | HEART RATE: 70 BPM | BODY MASS INDEX: 24.68 KG/M2 | TEMPERATURE: 97.7 F | WEIGHT: 167.11 LBS | OXYGEN SATURATION: 98 %

## 2024-02-04 DIAGNOSIS — R60.9 PERIPHERAL EDEMA: Primary | ICD-10-CM

## 2024-02-04 LAB
ALBUMIN SERPL-MCNC: 2.9 G/DL (ref 3.5–5)
ALBUMIN/GLOB SERPL: 0.8 (ref 1.1–2.2)
ALP SERPL-CCNC: 57 U/L (ref 45–117)
ALT SERPL-CCNC: 23 U/L (ref 12–78)
ANION GAP SERPL CALC-SCNC: ABNORMAL MMOL/L (ref 5–15)
APPEARANCE UR: CLEAR
AST SERPL-CCNC: 20 U/L (ref 15–37)
BACTERIA URNS QL MICRO: NEGATIVE /HPF
BASOPHILS # BLD: 0 K/UL (ref 0–0.1)
BASOPHILS NFR BLD: 1 % (ref 0–1)
BILIRUB SERPL-MCNC: 0.6 MG/DL (ref 0.2–1)
BILIRUB UR QL: NEGATIVE
BUN SERPL-MCNC: 19 MG/DL (ref 6–20)
BUN/CREAT SERPL: 20 (ref 12–20)
CALCIUM SERPL-MCNC: 8.2 MG/DL (ref 8.5–10.1)
CHLORIDE SERPL-SCNC: 111 MMOL/L (ref 97–108)
CO2 SERPL-SCNC: 33 MMOL/L (ref 21–32)
COLOR UR: ABNORMAL
CREAT SERPL-MCNC: 0.93 MG/DL (ref 0.7–1.3)
DIFFERENTIAL METHOD BLD: ABNORMAL
EOSINOPHIL # BLD: 0.1 K/UL (ref 0–0.4)
EOSINOPHIL NFR BLD: 2 % (ref 0–7)
EPITH CASTS URNS QL MICRO: ABNORMAL /LPF
ERYTHROCYTE [DISTWIDTH] IN BLOOD BY AUTOMATED COUNT: 15.6 % (ref 11.5–14.5)
GLOBULIN SER CALC-MCNC: 3.6 G/DL (ref 2–4)
GLUCOSE SERPL-MCNC: 99 MG/DL (ref 65–100)
GLUCOSE UR STRIP.AUTO-MCNC: NEGATIVE MG/DL
HCT VFR BLD AUTO: 42.6 % (ref 36.6–50.3)
HGB BLD-MCNC: 13.5 G/DL (ref 12.1–17)
HGB UR QL STRIP: NEGATIVE
IMM GRANULOCYTES # BLD AUTO: 0 K/UL (ref 0–0.04)
IMM GRANULOCYTES NFR BLD AUTO: 1 % (ref 0–0.5)
INR PPP: 1 (ref 0.9–1.1)
KETONES UR QL STRIP.AUTO: ABNORMAL MG/DL
LEUKOCYTE ESTERASE UR QL STRIP.AUTO: ABNORMAL
LYMPHOCYTES # BLD: 2.4 K/UL (ref 0.8–3.5)
LYMPHOCYTES NFR BLD: 39 % (ref 12–49)
MCH RBC QN AUTO: 27.7 PG (ref 26–34)
MCHC RBC AUTO-ENTMCNC: 31.7 G/DL (ref 30–36.5)
MCV RBC AUTO: 87.5 FL (ref 80–99)
MONOCYTES # BLD: 0.5 K/UL (ref 0–1)
MONOCYTES NFR BLD: 9 % (ref 5–13)
MUCOUS THREADS URNS QL MICRO: ABNORMAL /LPF
NEUTS SEG # BLD: 2.9 K/UL (ref 1.8–8)
NEUTS SEG NFR BLD: 48 % (ref 32–75)
NITRITE UR QL STRIP.AUTO: NEGATIVE
NRBC # BLD: 0 K/UL (ref 0–0.01)
NRBC BLD-RTO: 0 PER 100 WBC
PH UR STRIP: 6.5 (ref 5–8)
PLATELET # BLD AUTO: 222 K/UL (ref 150–400)
PMV BLD AUTO: 9.6 FL (ref 8.9–12.9)
POTASSIUM SERPL-SCNC: 3.6 MMOL/L (ref 3.5–5.1)
PROT SERPL-MCNC: 6.5 G/DL (ref 6.4–8.2)
PROT UR STRIP-MCNC: 100 MG/DL
PROTHROMBIN TIME: 10.6 SEC (ref 9–11.1)
RBC # BLD AUTO: 4.87 M/UL (ref 4.1–5.7)
RBC #/AREA URNS HPF: ABNORMAL /HPF (ref 0–5)
SODIUM SERPL-SCNC: 142 MMOL/L (ref 136–145)
SP GR UR REFRACTOMETRY: 1.03
SPECIMEN HOLD: NORMAL
UROBILINOGEN UR QL STRIP.AUTO: 1 EU/DL (ref 0.2–1)
WBC # BLD AUTO: 6 K/UL (ref 4.1–11.1)
WBC URNS QL MICRO: ABNORMAL /HPF (ref 0–4)

## 2024-02-04 PROCEDURE — 80053 COMPREHEN METABOLIC PANEL: CPT

## 2024-02-04 PROCEDURE — 85025 COMPLETE CBC W/AUTO DIFF WBC: CPT

## 2024-02-04 PROCEDURE — 36415 COLL VENOUS BLD VENIPUNCTURE: CPT

## 2024-02-04 PROCEDURE — 85610 PROTHROMBIN TIME: CPT

## 2024-02-04 PROCEDURE — 71045 X-RAY EXAM CHEST 1 VIEW: CPT

## 2024-02-04 PROCEDURE — 99284 EMERGENCY DEPT VISIT MOD MDM: CPT

## 2024-02-04 PROCEDURE — 72131 CT LUMBAR SPINE W/O DYE: CPT

## 2024-02-04 PROCEDURE — 81001 URINALYSIS AUTO W/SCOPE: CPT

## 2024-02-04 ASSESSMENT — PAIN SCALES - GENERAL: PAINLEVEL_OUTOF10: 7

## 2024-02-04 NOTE — ED PROVIDER NOTES
to hold your cholesterol medication as told by your doctor and reassess her condition in 1 week to see if diuretics are needed.    [facility] [place] [facility] EM[facility]  [address] [facility] [facility] [address]  [phone]  In 2 days  If symptoms worsen to include any worsening shortness of breath, chest pain or any new concerns.         DISCHARGE MEDICATIONS:     Medication List        ASK your doctor about these medications      amLODIPine 2.5 MG tablet  Commonly known as: NORVASC  Take 1 tablet by mouth daily     clobetasol 0.05 % ointment  Commonly known as: TEMOVATE     diclofenac 75 MG EC tablet  Commonly known as: VOLTAREN  Take 1 tablet by mouth 2 times daily     methocarbamol 750 MG tablet  Commonly known as: Robaxin-750  Si tab po tid prn spasms     sildenafil 100 MG tablet  Commonly known as: VIAGRA  Si tab po daily  prn daily                DISCONTINUED MEDICATIONS:  Current Discharge Medication List          I am the Primary Clinician of Record.   Da Quevedo MD (electronically signed)      (Please note that parts of this dictation were completed with voice recognition software. Quite often unanticipated grammatical, syntax, homophones, and other interpretive errors are inadvertently transcribed by the computer software. Please disregards these errors. Please excuse any errors that have escaped final proofreading.)         Da Quevedo MD  24 1112

## 2024-02-24 ENCOUNTER — HOSPITAL ENCOUNTER (EMERGENCY)
Facility: HOSPITAL | Age: 59
Discharge: HOME | End: 2024-02-24
Attending: EMERGENCY MEDICINE
Payer: MEDICAID

## 2024-02-24 VITALS
HEIGHT: 69 IN | WEIGHT: 171 LBS | OXYGEN SATURATION: 99 % | DIASTOLIC BLOOD PRESSURE: 68 MMHG | SYSTOLIC BLOOD PRESSURE: 134 MMHG | HEART RATE: 75 BPM | BODY MASS INDEX: 25.33 KG/M2 | TEMPERATURE: 98.8 F | RESPIRATION RATE: 18 BRPM

## 2024-02-24 DIAGNOSIS — G56.03 BILATERAL CARPAL TUNNEL SYNDROME: ICD-10-CM

## 2024-02-24 DIAGNOSIS — R60.9 PERIPHERAL EDEMA: Primary | ICD-10-CM

## 2024-02-24 LAB
ANION GAP SERPL CALC-SCNC: 6 MMOL/L (ref 5–15)
BASOPHILS # BLD: 0 K/UL (ref 0–0.1)
BASOPHILS NFR BLD: 1 % (ref 0–1)
BUN SERPL-MCNC: 20 MG/DL (ref 6–20)
BUN/CREAT SERPL: 17 (ref 12–20)
CALCIUM SERPL-MCNC: 8 MG/DL (ref 8.5–10.1)
CHLORIDE SERPL-SCNC: 103 MMOL/L (ref 97–108)
CO2 SERPL-SCNC: 29 MMOL/L (ref 21–32)
CREAT SERPL-MCNC: 1.17 MG/DL (ref 0.7–1.3)
DIFFERENTIAL METHOD BLD: ABNORMAL
EOSINOPHIL # BLD: 0.1 K/UL (ref 0–0.4)
EOSINOPHIL NFR BLD: 2 % (ref 0–7)
ERYTHROCYTE [DISTWIDTH] IN BLOOD BY AUTOMATED COUNT: 15.4 % (ref 11.5–14.5)
GLUCOSE SERPL-MCNC: 85 MG/DL (ref 65–100)
HCT VFR BLD AUTO: 40.3 % (ref 36.6–50.3)
HGB BLD-MCNC: 13 G/DL (ref 12.1–17)
IMM GRANULOCYTES # BLD AUTO: 0 K/UL (ref 0–0.04)
IMM GRANULOCYTES NFR BLD AUTO: 0 % (ref 0–0.5)
LYMPHOCYTES # BLD: 2.6 K/UL (ref 0.8–3.5)
LYMPHOCYTES NFR BLD: 35 % (ref 12–49)
MCH RBC QN AUTO: 27.4 PG (ref 26–34)
MCHC RBC AUTO-ENTMCNC: 32.3 G/DL (ref 30–36.5)
MCV RBC AUTO: 84.8 FL (ref 80–99)
MONOCYTES # BLD: 0.6 K/UL (ref 0–1)
MONOCYTES NFR BLD: 9 % (ref 5–13)
NEUTS SEG # BLD: 4 K/UL (ref 1.8–8)
NEUTS SEG NFR BLD: 53 % (ref 32–75)
NRBC # BLD: 0 K/UL (ref 0–0.01)
NRBC BLD-RTO: 0 PER 100 WBC
NT PRO BNP: 107 PG/ML (ref 0–125)
PLATELET # BLD AUTO: 239 K/UL (ref 150–400)
PMV BLD AUTO: 9 FL (ref 8.9–12.9)
POTASSIUM SERPL-SCNC: 3.6 MMOL/L (ref 3.5–5.1)
RBC # BLD AUTO: 4.75 M/UL (ref 4.1–5.7)
SODIUM SERPL-SCNC: 138 MMOL/L (ref 136–145)
WBC # BLD AUTO: 7.4 K/UL (ref 4.1–11.1)

## 2024-02-24 PROCEDURE — 36415 COLL VENOUS BLD VENIPUNCTURE: CPT

## 2024-02-24 PROCEDURE — 80048 BASIC METABOLIC PNL TOTAL CA: CPT

## 2024-02-24 PROCEDURE — 6370000000 HC RX 637 (ALT 250 FOR IP): Performed by: EMERGENCY MEDICINE

## 2024-02-24 PROCEDURE — 99283 EMERGENCY DEPT VISIT LOW MDM: CPT

## 2024-02-24 PROCEDURE — 85025 COMPLETE CBC W/AUTO DIFF WBC: CPT

## 2024-02-24 PROCEDURE — 83880 ASSAY OF NATRIURETIC PEPTIDE: CPT

## 2024-02-24 RX ORDER — PREDNISONE 10 MG/1
TABLET ORAL
Qty: 1 EACH | Refills: 0 | Status: SHIPPED | OUTPATIENT
Start: 2024-02-24

## 2024-02-24 RX ORDER — PREDNISONE 20 MG/1
60 TABLET ORAL
Status: COMPLETED | OUTPATIENT
Start: 2024-02-24 | End: 2024-02-24

## 2024-02-24 RX ADMIN — PREDNISONE 60 MG: 20 TABLET ORAL at 21:05

## 2024-02-24 ASSESSMENT — PAIN - FUNCTIONAL ASSESSMENT: PAIN_FUNCTIONAL_ASSESSMENT: 0-10

## 2024-02-24 ASSESSMENT — PAIN SCALES - GENERAL: PAINLEVEL_OUTOF10: 10

## 2024-02-25 NOTE — ED PROVIDER NOTES
[facility] EMERGENCY DEPT  EMERGENCY DEPARTMENT ENCOUNTER         Pt Name: Brayden Monzon  MRN: [id number]  Birthdate 1965  Date of evaluation: 2/24/2024  Provider: Soha Price MD   PCP: Keegan Singleton Jr., MD  Note Started: 9:48 PM 2/24/24     CHIEF COMPLAINT       Chief Complaint   Patient presents with    Extremity Swelling        HISTORY OF PRESENT ILLNESS: 1 or more elements      History From: Patient  HPI Limitations: None     Brayden Monzon is a 58 y.o. male who presents with persistent swelling in all 4 extremities, started about a month ago. He has a history of HTN, HLD, chronic cervical and lumbar back pain. Family history of (his mother) lupus. He denies any changes in medications or diet when the swelling started. He was seen for this at [facility] on 2/4, labs and imaging showed no evidence of chf, renal or liver failure. He was advised to limit salt intake and follow up with pcp. He was given a short course of steroids for his back pain along with diclofenac which he takes regularly and feels that maybe steroids helped a little. He has a pcp fup appt scheduled Monday. Came in today because of persistence of swelling that now is causing pressure like pain in ankles and wrists and intermittent numbness in both hands.        Please see more comprehensive history below under MDM  Nursing Notes were all reviewed in real time as they are made available. Any disagreements addressed in the HPI/MDM.     REVIEW OF SYSTEMS      Review of Systems     Positives and Pertinent negatives as per HPI and MDM.    PAST HISTORY     Past Medical History:  Past Medical History:   Diagnosis Date    Anxiety     anxiety    Chronic pain     back    Gastrointestinal disorder     GERD (gastroesophageal reflux disease)     H/O Bell's palsy     left side of face tingles, intermittently, not frequently    Hyperlipidemia     Hypertension     Meningitis 2012    spinal meningitis  ; resolved as of 4/3/14    Positive PPD     CXR have

## 2024-02-25 NOTE — DISCHARGE INSTRUCTIONS
It was a pleasure taking care of you in our Emergency Department today.  We know that when you come to [facility], you are entrusting us with your health, comfort, and safety.  Our physicians and nurses honor that trust, and truly appreciate the opportunity to care for you and your loved ones.    We also value your feedback.  If you receive a survey about your Emergency Department experience today, please fill it out.  We care about our patients' feedback, and we listen to what you have to say.  Thank you!      Dr. Soha Price MD      - Try to wear compression stockings at all times  - Continue diclofenac as prescribed, start prednisone which was prescribed today. This will be a longer treatment course.  - Elevate legs at night  - Follow up with your doctor on Monday as scheduled for further workup. Your doctor will decide if you may need a fluid pill at that point.  - Try to avoid salt as much as you can.

## 2024-02-25 NOTE — ED TRIAGE NOTES
Pt to be re-eval for 10/10 bi lat upper and lower extremity swelling that's been ongoing. Pt seen and eval at [facility] and instructed to f/u with pcp; has an appt on Mon. Pt states his pain is 10/10 but he doesn't like to take pills. Pt has swelling noted to upper extremities and gait steady in triage.

## 2024-02-25 NOTE — ED NOTES
Discharge instructions given to patient by  RN. Pt has been given counseling regarding at home treatment plan. Pt verbalizes understanding of need to seek further treatment if symptoms worsen. Pt ambulated off of unit in no signs of distress.

## 2024-02-26 ENCOUNTER — OFFICE VISIT (OUTPATIENT)
Facility: CLINIC | Age: 59
End: 2024-02-26
Payer: MEDICAID

## 2024-02-26 VITALS
SYSTOLIC BLOOD PRESSURE: 110 MMHG | TEMPERATURE: 98 F | HEART RATE: 70 BPM | WEIGHT: 164 LBS | BODY MASS INDEX: 24.29 KG/M2 | HEIGHT: 69 IN | RESPIRATION RATE: 19 BRPM | OXYGEN SATURATION: 95 % | DIASTOLIC BLOOD PRESSURE: 64 MMHG

## 2024-02-26 DIAGNOSIS — M47.816 LOCALIZED OSTEOARTHRITIS OF LUMBAR SPINE: ICD-10-CM

## 2024-02-26 DIAGNOSIS — I10 PRIMARY HYPERTENSION: Primary | ICD-10-CM

## 2024-02-26 DIAGNOSIS — K21.9 GASTROESOPHAGEAL REFLUX DISEASE WITHOUT ESOPHAGITIS: ICD-10-CM

## 2024-02-26 DIAGNOSIS — M50.90 CERVICAL DISC DISEASE: ICD-10-CM

## 2024-02-26 PROCEDURE — 3078F DIAST BP <80 MM HG: CPT | Performed by: INTERNAL MEDICINE

## 2024-02-26 PROCEDURE — 3074F SYST BP LT 130 MM HG: CPT | Performed by: INTERNAL MEDICINE

## 2024-02-26 PROCEDURE — 99214 OFFICE O/P EST MOD 30 MIN: CPT | Performed by: INTERNAL MEDICINE

## 2024-02-26 RX ORDER — SILDENAFIL 100 MG/1
TABLET, FILM COATED ORAL
Qty: 30 TABLET | Refills: 12 | Status: SHIPPED | OUTPATIENT
Start: 2024-02-26

## 2024-02-26 RX ORDER — PREDNISONE 5 MG/1
TABLET ORAL
Qty: 21 EACH | Refills: 1 | Status: SHIPPED | OUTPATIENT
Start: 2024-02-26 | End: 2024-02-26

## 2024-02-26 RX ORDER — AMLODIPINE BESYLATE 2.5 MG/1
2.5 TABLET ORAL DAILY
Qty: 30 TABLET | Refills: 12 | Status: SHIPPED | OUTPATIENT
Start: 2024-02-26

## 2024-02-26 SDOH — SOCIAL STABILITY: SOCIAL NETWORK
DO YOU BELONG TO ANY CLUBS OR ORGANIZATIONS SUCH AS CHURCH GROUPS UNIONS, FRATERNAL OR ATHLETIC GROUPS, OR SCHOOL GROUPS?: NO

## 2024-02-26 SDOH — SOCIAL STABILITY: SOCIAL NETWORK: ARE YOU MARRIED, WIDOWED, DIVORCED, SEPARATED, NEVER MARRIED, OR LIVING WITH A PARTNER?: LIVING WITH PARTNER

## 2024-02-26 SDOH — SOCIAL STABILITY: SOCIAL NETWORK: HOW OFTEN DO YOU ATTEND CHURCH OR RELIGIOUS SERVICES?: NEVER

## 2024-02-26 SDOH — ECONOMIC STABILITY: TRANSPORTATION INSECURITY
IN THE PAST 12 MONTHS, HAS THE LACK OF TRANSPORTATION KEPT YOU FROM MEDICAL APPOINTMENTS OR FROM GETTING MEDICATIONS?: NO

## 2024-02-26 SDOH — SOCIAL STABILITY: SOCIAL NETWORK: HOW OFTEN DO YOU ATTENT MEETINGS OF THE CLUB OR ORGANIZATION YOU BELONG TO?: NEVER

## 2024-02-26 SDOH — SOCIAL STABILITY: SOCIAL INSECURITY: WITHIN THE LAST YEAR, HAVE YOU BEEN HUMILIATED OR EMOTIONALLY ABUSED IN OTHER WAYS BY YOUR PARTNER OR EX-PARTNER?: NO

## 2024-02-26 SDOH — ECONOMIC STABILITY: INCOME INSECURITY: IN THE LAST 12 MONTHS, WAS THERE A TIME WHEN YOU WERE NOT ABLE TO PAY THE MORTGAGE OR RENT ON TIME?: NO

## 2024-02-26 SDOH — HEALTH STABILITY: MENTAL HEALTH: HOW OFTEN DO YOU HAVE A DRINK CONTAINING ALCOHOL?: MONTHLY OR LESS

## 2024-02-26 SDOH — SOCIAL STABILITY: SOCIAL INSECURITY
WITHIN THE LAST YEAR, HAVE TO BEEN RAPED OR FORCED TO HAVE ANY KIND OF SEXUAL ACTIVITY BY YOUR PARTNER OR EX-PARTNER?: NO

## 2024-02-26 SDOH — HEALTH STABILITY: MENTAL HEALTH: HOW MANY STANDARD DRINKS CONTAINING ALCOHOL DO YOU HAVE ON A TYPICAL DAY?: 1 OR 2

## 2024-02-26 SDOH — ECONOMIC STABILITY: FOOD INSECURITY: WITHIN THE PAST 12 MONTHS, YOU WORRIED THAT YOUR FOOD WOULD RUN OUT BEFORE YOU GOT MONEY TO BUY MORE.: NEVER TRUE

## 2024-02-26 SDOH — SOCIAL STABILITY: SOCIAL NETWORK: IN A TYPICAL WEEK, HOW MANY TIMES DO YOU TALK ON THE PHONE WITH FAMILY, FRIENDS, OR NEIGHBORS?: ONCE A WEEK

## 2024-02-26 SDOH — HEALTH STABILITY: MENTAL HEALTH
STRESS IS WHEN SOMEONE FEELS TENSE, NERVOUS, ANXIOUS, OR CAN'T SLEEP AT NIGHT BECAUSE THEIR MIND IS TROUBLED. HOW STRESSED ARE YOU?: NOT AT ALL

## 2024-02-26 SDOH — ECONOMIC STABILITY: INCOME INSECURITY: HOW HARD IS IT FOR YOU TO PAY FOR THE VERY BASICS LIKE FOOD, HOUSING, MEDICAL CARE, AND HEATING?: SOMEWHAT HARD

## 2024-02-26 SDOH — HEALTH STABILITY: PHYSICAL HEALTH: ON AVERAGE, HOW MANY MINUTES DO YOU ENGAGE IN EXERCISE AT THIS LEVEL?: 0 MIN

## 2024-02-26 SDOH — ECONOMIC STABILITY: FOOD INSECURITY: WITHIN THE PAST 12 MONTHS, THE FOOD YOU BOUGHT JUST DIDN'T LAST AND YOU DIDN'T HAVE MONEY TO GET MORE.: NEVER TRUE

## 2024-02-26 SDOH — HEALTH STABILITY: PHYSICAL HEALTH: ON AVERAGE, HOW MANY DAYS PER WEEK DO YOU ENGAGE IN MODERATE TO STRENUOUS EXERCISE (LIKE A BRISK WALK)?: 0 DAYS

## 2024-02-26 SDOH — ECONOMIC STABILITY: TRANSPORTATION INSECURITY
IN THE PAST 12 MONTHS, HAS LACK OF TRANSPORTATION KEPT YOU FROM MEETINGS, WORK, OR FROM GETTING THINGS NEEDED FOR DAILY LIVING?: NO

## 2024-02-26 SDOH — SOCIAL STABILITY: SOCIAL INSECURITY: WITHIN THE LAST YEAR, HAVE YOU BEEN AFRAID OF YOUR PARTNER OR EX-PARTNER?: NO

## 2024-02-26 SDOH — SOCIAL STABILITY: SOCIAL NETWORK: HOW OFTEN DO YOU GET TOGETHER WITH FRIENDS OR RELATIVES?: ONCE A WEEK

## 2024-02-26 SDOH — SOCIAL STABILITY: SOCIAL INSECURITY
WITHIN THE LAST YEAR, HAVE YOU BEEN KICKED, HIT, SLAPPED, OR OTHERWISE PHYSICALLY HURT BY YOUR PARTNER OR EX-PARTNER?: NO

## 2024-02-26 ASSESSMENT — ANXIETY QUESTIONNAIRES
5. BEING SO RESTLESS THAT IT IS HARD TO SIT STILL: 0
4. TROUBLE RELAXING: 0
1. FEELING NERVOUS, ANXIOUS, OR ON EDGE: 0
7. FEELING AFRAID AS IF SOMETHING AWFUL MIGHT HAPPEN: 0
IF YOU CHECKED OFF ANY PROBLEMS ON THIS QUESTIONNAIRE, HOW DIFFICULT HAVE THESE PROBLEMS MADE IT FOR YOU TO DO YOUR WORK, TAKE CARE OF THINGS AT HOME, OR GET ALONG WITH OTHER PEOPLE: NOT DIFFICULT AT ALL
3. WORRYING TOO MUCH ABOUT DIFFERENT THINGS: 0
GAD7 TOTAL SCORE: 0
6. BECOMING EASILY ANNOYED OR IRRITABLE: 0
2. NOT BEING ABLE TO STOP OR CONTROL WORRYING: 0

## 2024-02-26 ASSESSMENT — PATIENT HEALTH QUESTIONNAIRE - PHQ9
SUM OF ALL RESPONSES TO PHQ QUESTIONS 1-9: 0
7. TROUBLE CONCENTRATING ON THINGS, SUCH AS READING THE NEWSPAPER OR WATCHING TELEVISION: 0
5. POOR APPETITE OR OVEREATING: 0
10. IF YOU CHECKED OFF ANY PROBLEMS, HOW DIFFICULT HAVE THESE PROBLEMS MADE IT FOR YOU TO DO YOUR WORK, TAKE CARE OF THINGS AT HOME, OR GET ALONG WITH OTHER PEOPLE: 0
8. MOVING OR SPEAKING SO SLOWLY THAT OTHER PEOPLE COULD HAVE NOTICED. OR THE OPPOSITE, BEING SO FIGETY OR RESTLESS THAT YOU HAVE BEEN MOVING AROUND A LOT MORE THAN USUAL: 0
9. THOUGHTS THAT YOU WOULD BE BETTER OFF DEAD, OR OF HURTING YOURSELF: 0
6. FEELING BAD ABOUT YOURSELF - OR THAT YOU ARE A FAILURE OR HAVE LET YOURSELF OR YOUR FAMILY DOWN: 0
SUM OF ALL RESPONSES TO PHQ QUESTIONS 1-9: 0
1. LITTLE INTEREST OR PLEASURE IN DOING THINGS: 0
3. TROUBLE FALLING OR STAYING ASLEEP: 0
SUM OF ALL RESPONSES TO PHQ QUESTIONS 1-9: 0
2. FEELING DOWN, DEPRESSED OR HOPELESS: 0
4. FEELING TIRED OR HAVING LITTLE ENERGY: 0
SUM OF ALL RESPONSES TO PHQ QUESTIONS 1-9: 0
SUM OF ALL RESPONSES TO PHQ9 QUESTIONS 1 & 2: 0

## 2024-02-26 NOTE — PROGRESS NOTES
Brayden Monzon is a 58 y.o. male and presents with Follow-up (E. R  follow up)  .  Subjective:  Hypertension Review:  The patient has essential hypertension  Diet and Lifestyle: generally follows a  low sodium diet, exercises sporadically  Home BP Monitoring: is not measured at home.  Pertinent ROS: taking medications as instructed, no medication side effects noted, no TIA's, no chest pain on exertion, no dyspnea on exertion, no swelling of ankles.     Neck Pain Review:  Patient complains of neck pain. Onset of symptoms was a months ago, gradually worsening since that time. Current symptoms are pain in neck (aching, dull in character; 5/10 in severity), weakness in back. Patient denies numbness, tingling, paresthesias in upper extremities. Patient denies weakness, diminished  strength, lack of coordination. Radiation of pain: . Patient has had no prior neck problems.  Previous treatments include: medication: .  Images demonstrate no fracture or subluxation. There is mild degenerative disc  disease at C5-6. No substantial foraminal encroachment by bone spurs is  suggested. Soft tissues are unremarkable.      Review of Systems  Constitutional: negative for fevers, chills, anorexia and weight loss  Eyes:   negative for visual disturbance and irritation  ENT:   negative for tinnitus,sore throat,nasal congestion,ear pains.hoarseness  Respiratory:  negative for cough, hemoptysis, dyspnea,wheezing  CV:   negative for chest pain, palpitations, lower extremity edema  GI:   negative for nausea, vomiting, diarrhea, abdominal pain,melena  Endo:               negative for polyuria,polydipsia,polyphagia,heat intolerance  Genitourinary: negative for frequency, dysuria and hematuria  Integument:  negative for rash and pruritus  Hematologic:  negative for easy bruising and gum/nose bleeding  Musculoskel: myalgias, arthralgias, back pain,, joint pain  Neurological:  negative for headaches, dizziness, vertigo, memory problems

## 2024-02-26 NOTE — PROGRESS NOTES
\"Have you been to the ER, urgent care clinic since your last visit?  Hospitalized since your last visit?\"    yes    “Have you seen or consulted any other [facility] care providers [facility]?”    no

## 2024-03-16 ASSESSMENT — ENCOUNTER SYMPTOMS
SHORTNESS OF BREATH: 0
BACK PAIN: 0
ABDOMINAL PAIN: 0
COLOR CHANGE: 0
VOMITING: 0
NAUSEA: 0
COUGH: 0

## 2024-04-15 ENCOUNTER — HOSPITAL ENCOUNTER (EMERGENCY)
Facility: HOSPITAL | Age: 59
Discharge: LEFT BEFORE DISCHARGE | End: 2024-04-15

## 2024-04-15 ENCOUNTER — APPOINTMENT (OUTPATIENT)
Facility: HOSPITAL | Age: 59
End: 2024-04-15

## 2024-04-15 VITALS
BODY MASS INDEX: 22.96 KG/M2 | RESPIRATION RATE: 18 BRPM | HEIGHT: 69 IN | HEART RATE: 94 BPM | TEMPERATURE: 98.3 F | OXYGEN SATURATION: 100 % | SYSTOLIC BLOOD PRESSURE: 108 MMHG | DIASTOLIC BLOOD PRESSURE: 63 MMHG | WEIGHT: 155 LBS

## 2024-04-15 ASSESSMENT — PAIN DESCRIPTION - DESCRIPTORS: DESCRIPTORS: DISCOMFORT

## 2024-04-15 ASSESSMENT — PAIN - FUNCTIONAL ASSESSMENT: PAIN_FUNCTIONAL_ASSESSMENT: 0-10

## 2024-04-15 ASSESSMENT — PAIN DESCRIPTION - LOCATION: LOCATION: CHEST

## 2024-04-15 ASSESSMENT — PAIN SCALES - GENERAL: PAINLEVEL_OUTOF10: 5

## 2024-04-23 ENCOUNTER — OFFICE VISIT (OUTPATIENT)
Facility: CLINIC | Age: 59
End: 2024-04-23
Payer: MEDICAID

## 2024-04-23 VITALS
WEIGHT: 162 LBS | HEART RATE: 55 BPM | HEIGHT: 69 IN | DIASTOLIC BLOOD PRESSURE: 90 MMHG | BODY MASS INDEX: 23.99 KG/M2 | OXYGEN SATURATION: 92 % | TEMPERATURE: 98.3 F | RESPIRATION RATE: 20 BRPM | SYSTOLIC BLOOD PRESSURE: 138 MMHG

## 2024-04-23 DIAGNOSIS — M54.12 CERVICAL RADICULOPATHY: Primary | ICD-10-CM

## 2024-04-23 DIAGNOSIS — M50.90 CERVICAL DISC DISEASE: ICD-10-CM

## 2024-04-23 DIAGNOSIS — I10 PRIMARY HYPERTENSION: ICD-10-CM

## 2024-04-23 DIAGNOSIS — M51.06 LUMBAR DISC DISORDER WITH MYELOPATHY: ICD-10-CM

## 2024-04-23 PROCEDURE — 3075F SYST BP GE 130 - 139MM HG: CPT | Performed by: INTERNAL MEDICINE

## 2024-04-23 PROCEDURE — 3080F DIAST BP >= 90 MM HG: CPT | Performed by: INTERNAL MEDICINE

## 2024-04-23 PROCEDURE — 99214 OFFICE O/P EST MOD 30 MIN: CPT | Performed by: INTERNAL MEDICINE

## 2024-04-23 RX ORDER — PREDNISONE 5 MG/1
TABLET ORAL
Qty: 21 EACH | Refills: 1 | Status: SHIPPED | OUTPATIENT
Start: 2024-04-23

## 2024-04-23 SDOH — ECONOMIC STABILITY: FOOD INSECURITY: WITHIN THE PAST 12 MONTHS, THE FOOD YOU BOUGHT JUST DIDN'T LAST AND YOU DIDN'T HAVE MONEY TO GET MORE.: NEVER TRUE

## 2024-04-23 SDOH — ECONOMIC STABILITY: INCOME INSECURITY: HOW HARD IS IT FOR YOU TO PAY FOR THE VERY BASICS LIKE FOOD, HOUSING, MEDICAL CARE, AND HEATING?: SOMEWHAT HARD

## 2024-04-23 ASSESSMENT — PATIENT HEALTH QUESTIONNAIRE - PHQ9
6. FEELING BAD ABOUT YOURSELF - OR THAT YOU ARE A FAILURE OR HAVE LET YOURSELF OR YOUR FAMILY DOWN: NOT AT ALL
2. FEELING DOWN, DEPRESSED OR HOPELESS: NEARLY EVERY DAY
3. TROUBLE FALLING OR STAYING ASLEEP: NEARLY EVERY DAY
SUM OF ALL RESPONSES TO PHQ QUESTIONS 1-9: 6
SUM OF ALL RESPONSES TO PHQ QUESTIONS 1-9: 8
SUM OF ALL RESPONSES TO PHQ QUESTIONS 1-9: 6
SUM OF ALL RESPONSES TO PHQ QUESTIONS 1-9: 8
6. FEELING BAD ABOUT YOURSELF - OR THAT YOU ARE A FAILURE OR HAVE LET YOURSELF OR YOUR FAMILY DOWN: NOT AT ALL
SUM OF ALL RESPONSES TO PHQ9 QUESTIONS 1 & 2: 3
10. IF YOU CHECKED OFF ANY PROBLEMS, HOW DIFFICULT HAVE THESE PROBLEMS MADE IT FOR YOU TO DO YOUR WORK, TAKE CARE OF THINGS AT HOME, OR GET ALONG WITH OTHER PEOPLE: NOT DIFFICULT AT ALL
2. FEELING DOWN, DEPRESSED OR HOPELESS: NOT AT ALL
SUM OF ALL RESPONSES TO PHQ QUESTIONS 1-9: 8
7. TROUBLE CONCENTRATING ON THINGS, SUCH AS READING THE NEWSPAPER OR WATCHING TELEVISION: MORE THAN HALF THE DAYS
SUM OF ALL RESPONSES TO PHQ9 QUESTIONS 1 & 2: 0
1. LITTLE INTEREST OR PLEASURE IN DOING THINGS: NOT AT ALL
5. POOR APPETITE OR OVEREATING: NOT AT ALL
5. POOR APPETITE OR OVEREATING: NOT AT ALL
7. TROUBLE CONCENTRATING ON THINGS, SUCH AS READING THE NEWSPAPER OR WATCHING TELEVISION: NOT AT ALL
4. FEELING TIRED OR HAVING LITTLE ENERGY: NOT AT ALL
SUM OF ALL RESPONSES TO PHQ QUESTIONS 1-9: 6
8. MOVING OR SPEAKING SO SLOWLY THAT OTHER PEOPLE COULD HAVE NOTICED. OR THE OPPOSITE, BEING SO FIGETY OR RESTLESS THAT YOU HAVE BEEN MOVING AROUND A LOT MORE THAN USUAL: NOT AT ALL
9. THOUGHTS THAT YOU WOULD BE BETTER OFF DEAD, OR OF HURTING YOURSELF: NOT AT ALL
10. IF YOU CHECKED OFF ANY PROBLEMS, HOW DIFFICULT HAVE THESE PROBLEMS MADE IT FOR YOU TO DO YOUR WORK, TAKE CARE OF THINGS AT HOME, OR GET ALONG WITH OTHER PEOPLE: NOT DIFFICULT AT ALL
8. MOVING OR SPEAKING SO SLOWLY THAT OTHER PEOPLE COULD HAVE NOTICED. OR THE OPPOSITE, BEING SO FIGETY OR RESTLESS THAT YOU HAVE BEEN MOVING AROUND A LOT MORE THAN USUAL: NOT AT ALL
9. THOUGHTS THAT YOU WOULD BE BETTER OFF DEAD, OR OF HURTING YOURSELF: NOT AT ALL
3. TROUBLE FALLING OR STAYING ASLEEP: NEARLY EVERY DAY
4. FEELING TIRED OR HAVING LITTLE ENERGY: NEARLY EVERY DAY
SUM OF ALL RESPONSES TO PHQ QUESTIONS 1-9: 8
SUM OF ALL RESPONSES TO PHQ QUESTIONS 1-9: 6
1. LITTLE INTEREST OR PLEASURE IN DOING THINGS: NOT AT ALL

## 2024-04-23 ASSESSMENT — ANXIETY QUESTIONNAIRES
6. BECOMING EASILY ANNOYED OR IRRITABLE: NOT AT ALL
2. NOT BEING ABLE TO STOP OR CONTROL WORRYING: NOT AT ALL
3. WORRYING TOO MUCH ABOUT DIFFERENT THINGS: NOT AT ALL
4. TROUBLE RELAXING: NOT AT ALL
5. BEING SO RESTLESS THAT IT IS HARD TO SIT STILL: NOT AT ALL
GAD7 TOTAL SCORE: 0
IF YOU CHECKED OFF ANY PROBLEMS ON THIS QUESTIONNAIRE, HOW DIFFICULT HAVE THESE PROBLEMS MADE IT FOR YOU TO DO YOUR WORK, TAKE CARE OF THINGS AT HOME, OR GET ALONG WITH OTHER PEOPLE: NOT DIFFICULT AT ALL
7. FEELING AFRAID AS IF SOMETHING AWFUL MIGHT HAPPEN: NOT AT ALL
1. FEELING NERVOUS, ANXIOUS, OR ON EDGE: NOT AT ALL

## 2024-04-23 NOTE — PATIENT INSTRUCTIONS
Thank you for enrolling in [employer]. Please follow the instructions below to securely access your online medical record. [employer] allows you to send messages to your doctor, view your test results, renew your prescriptions, schedule appointments, and more.     How Do I Sign Up?  In your Internet browser, go to [email or web address]  Click on the Sign Up Now link in the Sign In box. You will see the New Member Sign Up page.  Enter your [employer] Access Code exactly as it appears below. You will not need to use this code after you’ve completed the sign-up process. If you do not sign up before the expiration date, you must request a new code.  [employer] Access Code: Activation code not generated  Current [employer] Status: Active    Enter your Social Security Number ([id number]) and Date of Birth (mm/dd/yyyy) as indicated and click Submit. You will be taken to the next sign-up page.  Create a [employer] ID. This will be your [employer] login ID and cannot be changed, so think of one that is secure and easy to remember.  Create a [employer] password. You can change your password at any time.  Enter your Password Reset Question and Answer. This can be used at a later time if you forget your password.   Enter your e-mail address. You will receive e-mail notification when new information is available in [employer].  Click Sign Up. You can now view your medical record.     Additional Information  If you have questions, please contact your physician practice where you receive care. Remember, [employer] is NOT to be used for urgent needs. For medical emergencies, dial 911.

## 2024-04-23 NOTE — PROGRESS NOTES
Brayden Monzon is a 58 y.o. male and presents with Follow-up (Hand swelling/)  .  Subjective:  Neck Pain Review:  Patient complains of neck pain. Onset of symptoms was a few weeks ago, gradually worsening since that time. Current symptoms are pain in neck (aching, dull in character; 9/10 in severity), weakness in back. Patient has numbness, tingling, paresthesias in upper extremities. Patient has weakness, diminished  strength,  Radiation of pain: . Patient has had  prior neck problems.  Previous treatments include: medication: prednisone dose pack..    He states most of his joints ache at times    Hypertension Review:  The patient has essential hypertension  Diet and Lifestyle: generally follows a  low sodium diet, exercises sporadically  Home BP Monitoring: is not measured at home.  Pertinent ROS: taking medications as instructed, no medication side effects noted, no TIA's, no chest pain on exertion, no dyspnea on exertion, no swelling of ankles. '    He states he is to receive a biopsy of his prostate    Review of Systems  Constitutional: negative for fevers, chills, anorexia and weight loss  Eyes:   negative for visual disturbance and irritation  ENT:   negative for tinnitus,sore throat,nasal congestion,ear pains.hoarseness  Respiratory:  negative for cough, hemoptysis, dyspnea,wheezing  CV:   negative for chest pain, palpitations, lower extremity edema  GI:   negative for nausea, vomiting, diarrhea, abdominal pain,melena  Endo:               negative for polyuria,polydipsia,polyphagia,heat intolerance  Genitourinary: negative for frequency, dysuria and hematuria  Integument:  negative for rash and pruritus  Hematologic:  negative for easy bruising and gum/nose bleeding  Musculoskel: myalgias, arthralgias, back pain,  joint pain  Neurological:  negative for headaches, dizziness, vertigo, memory problems and gait   Behavl/Psych: negative for feelings of anxiety, depression, mood changes    Past Medical

## 2024-04-23 NOTE — PROGRESS NOTES
\"Have you been to the ER, urgent care clinic since your last visit?  Hospitalized since your last visit?\"    yes    “Have you seen or consulted any other health care providers outside of [facility] since your last visit?”    no            Click Here for Release of Records Request

## 2024-04-25 LAB
EKG ATRIAL RATE: 95 BPM
EKG DIAGNOSIS: NORMAL
EKG P AXIS: 76 DEGREES
EKG P-R INTERVAL: 150 MS
EKG Q-T INTERVAL: 330 MS
EKG QRS DURATION: 82 MS
EKG QTC CALCULATION (BAZETT): 414 MS
EKG R AXIS: 20 DEGREES
EKG T AXIS: 50 DEGREES
EKG VENTRICULAR RATE: 95 BPM

## 2024-05-10 ENCOUNTER — HOSPITAL ENCOUNTER (OUTPATIENT)
Age: 59
End: 2024-05-10
Payer: MEDICAID

## 2024-05-10 DIAGNOSIS — M50.90 CERVICAL DISC DISEASE: ICD-10-CM

## 2024-05-10 DIAGNOSIS — M54.12 CERVICAL RADICULOPATHY: ICD-10-CM

## 2024-05-10 PROCEDURE — 72141 MRI NECK SPINE W/O DYE: CPT

## 2024-05-21 ENCOUNTER — APPOINTMENT (OUTPATIENT)
Facility: HOSPITAL | Age: 59
End: 2024-05-21
Payer: MEDICAID

## 2024-05-21 ENCOUNTER — HOSPITAL ENCOUNTER (EMERGENCY)
Facility: HOSPITAL | Age: 59
Discharge: HOME | End: 2024-05-21
Attending: EMERGENCY MEDICINE
Payer: MEDICAID

## 2024-05-21 VITALS
HEART RATE: 96 BPM | TEMPERATURE: 97.9 F | RESPIRATION RATE: 18 BRPM | SYSTOLIC BLOOD PRESSURE: 125 MMHG | OXYGEN SATURATION: 97 % | DIASTOLIC BLOOD PRESSURE: 92 MMHG

## 2024-05-21 DIAGNOSIS — M54.12 CERVICAL RADICULOPATHY: Primary | ICD-10-CM

## 2024-05-21 DIAGNOSIS — R07.89 ATYPICAL CHEST PAIN: ICD-10-CM

## 2024-05-21 LAB
ALBUMIN SERPL-MCNC: 3.1 G/DL (ref 3.5–5)
ALBUMIN/GLOB SERPL: 0.9 (ref 1.1–2.2)
ALP SERPL-CCNC: 61 U/L (ref 45–117)
ALT SERPL-CCNC: 14 U/L (ref 12–78)
ANION GAP SERPL CALC-SCNC: 5 MMOL/L (ref 5–15)
AST SERPL-CCNC: 14 U/L (ref 15–37)
BASOPHILS # BLD: 0 K/UL (ref 0–0.1)
BASOPHILS NFR BLD: 1 % (ref 0–1)
BILIRUB SERPL-MCNC: 0.8 MG/DL (ref 0.2–1)
BUN SERPL-MCNC: 17 MG/DL (ref 6–20)
BUN/CREAT SERPL: 18 (ref 12–20)
CALCIUM SERPL-MCNC: 9 MG/DL (ref 8.5–10.1)
CHLORIDE SERPL-SCNC: 106 MMOL/L (ref 97–108)
CO2 SERPL-SCNC: 27 MMOL/L (ref 21–32)
CREAT SERPL-MCNC: 0.94 MG/DL (ref 0.7–1.3)
DIFFERENTIAL METHOD BLD: ABNORMAL
EOSINOPHIL # BLD: 0.1 K/UL (ref 0–0.4)
EOSINOPHIL NFR BLD: 2 % (ref 0–7)
ERYTHROCYTE [DISTWIDTH] IN BLOOD BY AUTOMATED COUNT: 14.8 % (ref 11.5–14.5)
GLOBULIN SER CALC-MCNC: 3.4 G/DL (ref 2–4)
GLUCOSE SERPL-MCNC: 129 MG/DL (ref 65–100)
HCT VFR BLD AUTO: 39.7 % (ref 36.6–50.3)
HGB BLD-MCNC: 12.9 G/DL (ref 12.1–17)
IMM GRANULOCYTES # BLD AUTO: 0 K/UL (ref 0–0.04)
IMM GRANULOCYTES NFR BLD AUTO: 0 % (ref 0–0.5)
LYMPHOCYTES # BLD: 1.5 K/UL (ref 0.8–3.5)
LYMPHOCYTES NFR BLD: 25 % (ref 12–49)
MCH RBC QN AUTO: 27.7 PG (ref 26–34)
MCHC RBC AUTO-ENTMCNC: 32.5 G/DL (ref 30–36.5)
MCV RBC AUTO: 85.4 FL (ref 80–99)
MONOCYTES # BLD: 0.5 K/UL (ref 0–1)
MONOCYTES NFR BLD: 8 % (ref 5–13)
NEUTS SEG # BLD: 3.9 K/UL (ref 1.8–8)
NEUTS SEG NFR BLD: 64 % (ref 32–75)
NRBC # BLD: 0 K/UL (ref 0–0.01)
NRBC BLD-RTO: 0 PER 100 WBC
PLATELET # BLD AUTO: 226 K/UL (ref 150–400)
PMV BLD AUTO: 9.4 FL (ref 8.9–12.9)
POTASSIUM SERPL-SCNC: 3.9 MMOL/L (ref 3.5–5.1)
PROT SERPL-MCNC: 6.5 G/DL (ref 6.4–8.2)
RBC # BLD AUTO: 4.65 M/UL (ref 4.1–5.7)
SODIUM SERPL-SCNC: 138 MMOL/L (ref 136–145)
TROPONIN I SERPL HS-MCNC: 8 NG/L (ref 0–76)
WBC # BLD AUTO: 6.1 K/UL (ref 4.1–11.1)

## 2024-05-21 PROCEDURE — 93005 ELECTROCARDIOGRAM TRACING: CPT | Performed by: EMERGENCY MEDICINE

## 2024-05-21 PROCEDURE — 36415 COLL VENOUS BLD VENIPUNCTURE: CPT

## 2024-05-21 PROCEDURE — 99285 EMERGENCY DEPT VISIT HI MDM: CPT

## 2024-05-21 PROCEDURE — 6360000002 HC RX W HCPCS: Performed by: EMERGENCY MEDICINE

## 2024-05-21 PROCEDURE — 85025 COMPLETE CBC W/AUTO DIFF WBC: CPT

## 2024-05-21 PROCEDURE — 71045 X-RAY EXAM CHEST 1 VIEW: CPT

## 2024-05-21 PROCEDURE — 96374 THER/PROPH/DIAG INJ IV PUSH: CPT

## 2024-05-21 PROCEDURE — 84484 ASSAY OF TROPONIN QUANT: CPT

## 2024-05-21 PROCEDURE — 80053 COMPREHEN METABOLIC PANEL: CPT

## 2024-05-21 RX ORDER — KETOROLAC TROMETHAMINE 30 MG/ML
15 INJECTION, SOLUTION INTRAMUSCULAR; INTRAVENOUS ONCE
Status: COMPLETED | OUTPATIENT
Start: 2024-05-21 | End: 2024-05-21

## 2024-05-21 RX ADMIN — KETOROLAC TROMETHAMINE 15 MG: 30 INJECTION, SOLUTION INTRAMUSCULAR at 09:39

## 2024-05-21 ASSESSMENT — PAIN SCALES - GENERAL: PAINLEVEL_OUTOF10: 8

## 2024-05-21 ASSESSMENT — PAIN DESCRIPTION - LOCATION: LOCATION: ARM

## 2024-05-21 ASSESSMENT — PAIN DESCRIPTION - ORIENTATION: ORIENTATION: LEFT

## 2024-05-21 ASSESSMENT — PAIN - FUNCTIONAL ASSESSMENT: PAIN_FUNCTIONAL_ASSESSMENT: 0-10

## 2024-05-21 ASSESSMENT — HEART SCORE: ECG: NORMAL

## 2024-05-21 NOTE — ED PROVIDER NOTES
basic lab work to rule out severe electrolyte derangements or anemia.   Will check troponin and ekg to rule out ACS  Will check chest XR to rule out focal airspace disease/fluid overload/PTX      Problems Addressed:  Atypical chest pain: chronic illness or injury  Cervical radiculopathy: chronic illness or injury    Amount and/or Complexity of Data Reviewed  Labs: ordered. Decision-making details documented in ED Course.  Radiology: ordered. Decision-making details documented in ED Course.  ECG/medicine tests: ordered and independent interpretation performed. Decision-making details documented in ED Course.    Risk  Prescription drug management.          CLINICAL MANAGEMENT TOOLS:  Not Applicable      ED Course as of 05/21/24 1534   Tue May 21, 2024   0914 EKG performed at 9 AM shows a normal sinus rhythm with a rate of 95, no acute ischemic changes per my interpretation [MEGAN]      ED Course User Index  [MEGAN] Lissette Everett MD         Lab work and imaging unremarkable.  Advised follow-up with spine for his herniated disc.  ED return precautions were discussed      FINAL IMPRESSION     1. Cervical radiculopathy    2. Atypical chest pain          DISPOSITION/PLAN   Brayden VILLALBA Na's  results have been reviewed with him.  He has been counseled regarding his diagnosis, treatment, and plan.  He verbally conveys understanding and agreement of the signs, symptoms, diagnosis, treatment and prognosis and additionally agrees to follow up as discussed.  He also agrees with the care-plan and conveys that all of his questions have been answered.  I have also provided discharge instructions for him that include: educational information regarding their diagnosis and treatment, and list of reasons why they would want to return to the ED prior to their follow-up appointment, should his condition change.     CLINICAL IMPRESSION    Discharge Note: The patient is stable for discharge home. The signs, symptoms, diagnosis, and

## 2024-05-22 LAB
EKG ATRIAL RATE: 95 BPM
EKG DIAGNOSIS: NORMAL
EKG P AXIS: 77 DEGREES
EKG P-R INTERVAL: 152 MS
EKG Q-T INTERVAL: 328 MS
EKG QRS DURATION: 88 MS
EKG QTC CALCULATION (BAZETT): 412 MS
EKG R AXIS: 13 DEGREES
EKG T AXIS: 52 DEGREES
EKG VENTRICULAR RATE: 95 BPM

## 2024-05-28 ENCOUNTER — OFFICE VISIT (OUTPATIENT)
Facility: CLINIC | Age: 59
End: 2024-05-28
Payer: MEDICAID

## 2024-05-28 VITALS
HEIGHT: 69 IN | DIASTOLIC BLOOD PRESSURE: 64 MMHG | RESPIRATION RATE: 16 BRPM | TEMPERATURE: 98 F | HEART RATE: 72 BPM | BODY MASS INDEX: 23.7 KG/M2 | WEIGHT: 160 LBS | SYSTOLIC BLOOD PRESSURE: 94 MMHG | OXYGEN SATURATION: 97 %

## 2024-05-28 DIAGNOSIS — I10 PRIMARY HYPERTENSION: ICD-10-CM

## 2024-05-28 DIAGNOSIS — M50.90 CERVICAL DISC DISEASE: ICD-10-CM

## 2024-05-28 DIAGNOSIS — M50.20 CERVICAL DISC HERNIATION: Primary | ICD-10-CM

## 2024-05-28 DIAGNOSIS — M54.12 CERVICAL RADICULOPATHY: ICD-10-CM

## 2024-05-28 DIAGNOSIS — K21.9 GASTROESOPHAGEAL REFLUX DISEASE WITHOUT ESOPHAGITIS: ICD-10-CM

## 2024-05-28 PROCEDURE — 99214 OFFICE O/P EST MOD 30 MIN: CPT | Performed by: INTERNAL MEDICINE

## 2024-05-28 PROCEDURE — 3074F SYST BP LT 130 MM HG: CPT | Performed by: INTERNAL MEDICINE

## 2024-05-28 PROCEDURE — 3078F DIAST BP <80 MM HG: CPT | Performed by: INTERNAL MEDICINE

## 2024-05-28 SDOH — ECONOMIC STABILITY: FOOD INSECURITY: WITHIN THE PAST 12 MONTHS, THE FOOD YOU BOUGHT JUST DIDN'T LAST AND YOU DIDN'T HAVE MONEY TO GET MORE.: NEVER TRUE

## 2024-05-28 SDOH — ECONOMIC STABILITY: INCOME INSECURITY: HOW HARD IS IT FOR YOU TO PAY FOR THE VERY BASICS LIKE FOOD, HOUSING, MEDICAL CARE, AND HEATING?: NOT HARD AT ALL

## 2024-05-28 SDOH — ECONOMIC STABILITY: FOOD INSECURITY: WITHIN THE PAST 12 MONTHS, YOU WORRIED THAT YOUR FOOD WOULD RUN OUT BEFORE YOU GOT MONEY TO BUY MORE.: NEVER TRUE

## 2024-05-28 ASSESSMENT — PATIENT HEALTH QUESTIONNAIRE - PHQ9
SUM OF ALL RESPONSES TO PHQ QUESTIONS 1-9: 1
2. FEELING DOWN, DEPRESSED OR HOPELESS: NOT AT ALL
SUM OF ALL RESPONSES TO PHQ QUESTIONS 1-9: 1
SUM OF ALL RESPONSES TO PHQ QUESTIONS 1-9: 1
SUM OF ALL RESPONSES TO PHQ9 QUESTIONS 1 & 2: 1
1. LITTLE INTEREST OR PLEASURE IN DOING THINGS: SEVERAL DAYS
SUM OF ALL RESPONSES TO PHQ QUESTIONS 1-9: 1

## 2024-05-28 NOTE — PROGRESS NOTES
Brayden Monzon is a 58 y.o. male and presents with Hypertension  .  Subjective:  Neck Pain Review:  Patient complains of neck pain. Onset of symptoms was a few weeks ago, gradually worsening since that time. Current symptoms are pain in neck (aching, dull in character; 510 in severity), weakness in back. Patient has numbness, tingling, paresthesias in upper extremities. Patient has weakness, diminished  strength,  Radiation of pain: . Patient has had  prior neck problems.  Previous treatments include: medication: prednisone dose pack..  Ct:   IMPRESSION:  Degenerative findings detailed by level in body report. Note left lateral and  foraminal disc herniation at C6-7 with moderate left foraminal stenosis.    He reports weakness of both hands with numbness reported of the fingers       Hypertension Review:  The patient has essential hypertension  Diet and Lifestyle: generally follows a  low sodium diet, exercises sporadically  Home BP Monitoring: is not measured at home.  Pertinent ROS: taking medications as instructed, no medication side effects noted, no TIA's, no chest pain on exertion, no dyspnea on exertion, no swelling of ankles. '        Review of Systems  Constitutional: negative for fevers, chills, anorexia and weight loss  Eyes:   negative for visual disturbance and irritation  ENT:   negative for tinnitus,sore throat,nasal congestion,ear pains.hoarseness  Respiratory:  negative for cough, hemoptysis, dyspnea,wheezing  CV:   negative for chest pain, palpitations, lower extremity edema  GI:   negative for nausea, vomiting, diarrhea, abdominal pain,melena  Endo:               negative for polyuria,polydipsia,polyphagia,heat intolerance  Genitourinary: negative for frequency, dysuria and hematuria  Integument:  negative for rash and pruritus  Hematologic:  negative for easy bruising and gum/nose bleeding  Musculoskel: myalgias, arthralgias, back pain,  joint pain  Neurological:  negative for headaches,

## 2024-05-28 NOTE — PROGRESS NOTES
1. Have you been to the ER, urgent care clinic since your last visit?  Hospitalized since your last visit?YES/[facility]    2. Have you seen or consulted any other health care providers outside of the [facility] since your last visit?  Include any pap smears or colon screening. No     Chief Complaint   Patient presents with    Hypertension         PHQ-9 Total Score: 1 (5/28/2024  9:53 AM)

## 2024-05-28 NOTE — PATIENT INSTRUCTIONS
Thank you for enrolling in [employer]. Please follow the instructions below to securely access your online medical record. [employer] allows you to send messages to your doctor, view your test results, renew your prescriptions, schedule appointments, and more.     How Do I Sign Up?  In your Internet browser, go to [email or web address]  Click on the Sign Up Now link in the Sign In box. You will see the New Member Sign Up page.  Enter your [employer] Access Code exactly as it appears below. You will not need to use this code after you’ve completed the sign-up process. If you do not sign up before the expiration date, you must request a new code.  [employer] Access Code: Activation code not generated  Current Hired Status: Active    Enter your Social Security Number ([email or web address]) and Date of Birth (mm/dd/yyyy) as indicated and click Submit. You will be taken to the next sign-up page.  Create a [employer] ID. This will be your [employer] login ID and cannot be changed, so think of one that is secure and easy to remember.  Create a [employer] password. You can change your password at any time.  Enter your Password Reset Question and Answer. This can be used at a later time if you forget your password.   Enter your e-mail address. You will receive e-mail notification when new information is available in [employer].  Click Sign Up. You can now view your medical record.     Additional Information  If you have questions, please contact your physician practice where you receive care. Remember, [employer] is NOT to be used for urgent needs. For medical emergencies, dial 911.

## 2024-06-25 ENCOUNTER — OFFICE VISIT (OUTPATIENT)
Facility: CLINIC | Age: 59
End: 2024-06-25
Payer: MEDICAID

## 2024-06-25 VITALS
WEIGHT: 156 LBS | TEMPERATURE: 98 F | BODY MASS INDEX: 23.11 KG/M2 | HEIGHT: 69 IN | OXYGEN SATURATION: 97 % | HEART RATE: 60 BPM | DIASTOLIC BLOOD PRESSURE: 80 MMHG | SYSTOLIC BLOOD PRESSURE: 130 MMHG

## 2024-06-25 DIAGNOSIS — M50.90 CERVICAL DISC DISEASE: ICD-10-CM

## 2024-06-25 DIAGNOSIS — M54.12 CERVICAL RADICULOPATHY: ICD-10-CM

## 2024-06-25 DIAGNOSIS — M50.20 CERVICAL DISC HERNIATION: Primary | ICD-10-CM

## 2024-06-25 DIAGNOSIS — I10 PRIMARY HYPERTENSION: ICD-10-CM

## 2024-06-25 PROCEDURE — 99214 OFFICE O/P EST MOD 30 MIN: CPT | Performed by: INTERNAL MEDICINE

## 2024-06-25 PROCEDURE — 3075F SYST BP GE 130 - 139MM HG: CPT | Performed by: INTERNAL MEDICINE

## 2024-06-25 PROCEDURE — 3079F DIAST BP 80-89 MM HG: CPT | Performed by: INTERNAL MEDICINE

## 2024-06-25 SDOH — ECONOMIC STABILITY: FOOD INSECURITY: WITHIN THE PAST 12 MONTHS, THE FOOD YOU BOUGHT JUST DIDN'T LAST AND YOU DIDN'T HAVE MONEY TO GET MORE.: NEVER TRUE

## 2024-06-25 SDOH — ECONOMIC STABILITY: FOOD INSECURITY: WITHIN THE PAST 12 MONTHS, YOU WORRIED THAT YOUR FOOD WOULD RUN OUT BEFORE YOU GOT MONEY TO BUY MORE.: NEVER TRUE

## 2024-06-25 SDOH — ECONOMIC STABILITY: INCOME INSECURITY: HOW HARD IS IT FOR YOU TO PAY FOR THE VERY BASICS LIKE FOOD, HOUSING, MEDICAL CARE, AND HEATING?: SOMEWHAT HARD

## 2024-06-25 ASSESSMENT — PATIENT HEALTH QUESTIONNAIRE - PHQ9
1. LITTLE INTEREST OR PLEASURE IN DOING THINGS: NOT AT ALL
SUM OF ALL RESPONSES TO PHQ QUESTIONS 1-9: 1
7. TROUBLE CONCENTRATING ON THINGS, SUCH AS READING THE NEWSPAPER OR WATCHING TELEVISION: NOT AT ALL
4. FEELING TIRED OR HAVING LITTLE ENERGY: NOT AT ALL
3. TROUBLE FALLING OR STAYING ASLEEP: SEVERAL DAYS
SUM OF ALL RESPONSES TO PHQ9 QUESTIONS 1 & 2: 0
6. FEELING BAD ABOUT YOURSELF - OR THAT YOU ARE A FAILURE OR HAVE LET YOURSELF OR YOUR FAMILY DOWN: NOT AT ALL
9. THOUGHTS THAT YOU WOULD BE BETTER OFF DEAD, OR OF HURTING YOURSELF: NOT AT ALL
SUM OF ALL RESPONSES TO PHQ QUESTIONS 1-9: 1
8. MOVING OR SPEAKING SO SLOWLY THAT OTHER PEOPLE COULD HAVE NOTICED. OR THE OPPOSITE, BEING SO FIGETY OR RESTLESS THAT YOU HAVE BEEN MOVING AROUND A LOT MORE THAN USUAL: NOT AT ALL
SUM OF ALL RESPONSES TO PHQ QUESTIONS 1-9: 1
2. FEELING DOWN, DEPRESSED OR HOPELESS: NOT AT ALL
SUM OF ALL RESPONSES TO PHQ QUESTIONS 1-9: 1
10. IF YOU CHECKED OFF ANY PROBLEMS, HOW DIFFICULT HAVE THESE PROBLEMS MADE IT FOR YOU TO DO YOUR WORK, TAKE CARE OF THINGS AT HOME, OR GET ALONG WITH OTHER PEOPLE: NOT DIFFICULT AT ALL
5. POOR APPETITE OR OVEREATING: NOT AT ALL

## 2024-06-25 ASSESSMENT — ANXIETY QUESTIONNAIRES
2. NOT BEING ABLE TO STOP OR CONTROL WORRYING: NOT AT ALL
7. FEELING AFRAID AS IF SOMETHING AWFUL MIGHT HAPPEN: NOT AT ALL
4. TROUBLE RELAXING: SEVERAL DAYS
3. WORRYING TOO MUCH ABOUT DIFFERENT THINGS: NOT AT ALL
1. FEELING NERVOUS, ANXIOUS, OR ON EDGE: SEVERAL DAYS
IF YOU CHECKED OFF ANY PROBLEMS ON THIS QUESTIONNAIRE, HOW DIFFICULT HAVE THESE PROBLEMS MADE IT FOR YOU TO DO YOUR WORK, TAKE CARE OF THINGS AT HOME, OR GET ALONG WITH OTHER PEOPLE: NOT DIFFICULT AT ALL
6. BECOMING EASILY ANNOYED OR IRRITABLE: SEVERAL DAYS
5. BEING SO RESTLESS THAT IT IS HARD TO SIT STILL: NOT AT ALL
GAD7 TOTAL SCORE: 3

## 2024-06-25 NOTE — PROGRESS NOTES
\"Have you been to the ER, urgent care clinic since your last visit?  Hospitalized since your last visit?\"    NO    “Have you seen or consulted any other health care providers outside of [facility] since your last visit?”    NO            Click Here for Release of Records Request    
reviewed with the patient details of the assessment and plan and all questions were answered. Relevent patient education was performed.The most recent lab findings were reviewed with the patient.    An After Visit Summary was printed and given to the patient.

## 2024-06-25 NOTE — PATIENT INSTRUCTIONS
Thank you for enrolling in 19pay. Please follow the instructions below to securely access your online medical record. [employer] allows you to send messages to your doctor, view your test results, renew your prescriptions, schedule appointments, and more.     How Do I Sign Up?  In your Internet browser, go to [email or web address]  Click on the Sign Up Now link in the Sign In box. You will see the New Member Sign Up page.  Enter your [employer] Access Code exactly as it appears below. You will not need to use this code after you’ve completed the sign-up process. If you do not sign up before the expiration date, you must request a new code.  19pay Access Code: Activation code not generated  Current 19pay Status: Active    Enter your Social Security Number ([id number]) and Date of Birth (mm/dd/yyyy) as indicated and click Submit. You will be taken to the next sign-up page.  Create a 19pay ID. This will be your 19pay login ID and cannot be changed, so think of one that is secure and easy to remember.  Create a 19pay password. You can change your password at any time.  Enter your Password Reset Question and Answer. This can be used at a later time if you forget your password.   Enter your e-mail address. You will receive e-mail notification when new information is available in 19pay.  Click Sign Up. You can now view your medical record.     Additional Information  If you have questions, please contact your physician practice where you receive care. Remember, 19pay is NOT to be used for urgent needs. For medical emergencies, dial 911.

## 2024-07-29 ENCOUNTER — OFFICE VISIT (OUTPATIENT)
Facility: CLINIC | Age: 59
End: 2024-07-29
Payer: MEDICAID

## 2024-07-29 VITALS
BODY MASS INDEX: 23.25 KG/M2 | HEIGHT: 69 IN | OXYGEN SATURATION: 99 % | WEIGHT: 157 LBS | DIASTOLIC BLOOD PRESSURE: 76 MMHG | SYSTOLIC BLOOD PRESSURE: 102 MMHG | HEART RATE: 79 BPM | RESPIRATION RATE: 16 BRPM | TEMPERATURE: 98 F

## 2024-07-29 DIAGNOSIS — I10 PRIMARY HYPERTENSION: ICD-10-CM

## 2024-07-29 DIAGNOSIS — M50.20 CERVICAL DISC HERNIATION: Primary | ICD-10-CM

## 2024-07-29 DIAGNOSIS — K21.9 GASTROESOPHAGEAL REFLUX DISEASE WITHOUT ESOPHAGITIS: ICD-10-CM

## 2024-07-29 DIAGNOSIS — M54.12 CERVICAL RADICULOPATHY: ICD-10-CM

## 2024-07-29 PROCEDURE — 99214 OFFICE O/P EST MOD 30 MIN: CPT | Performed by: INTERNAL MEDICINE

## 2024-07-29 PROCEDURE — 3078F DIAST BP <80 MM HG: CPT | Performed by: INTERNAL MEDICINE

## 2024-07-29 PROCEDURE — 3074F SYST BP LT 130 MM HG: CPT | Performed by: INTERNAL MEDICINE

## 2024-07-29 SDOH — ECONOMIC STABILITY: INCOME INSECURITY: HOW HARD IS IT FOR YOU TO PAY FOR THE VERY BASICS LIKE FOOD, HOUSING, MEDICAL CARE, AND HEATING?: NOT HARD AT ALL

## 2024-07-29 SDOH — ECONOMIC STABILITY: FOOD INSECURITY: WITHIN THE PAST 12 MONTHS, THE FOOD YOU BOUGHT JUST DIDN'T LAST AND YOU DIDN'T HAVE MONEY TO GET MORE.: NEVER TRUE

## 2024-07-29 SDOH — ECONOMIC STABILITY: FOOD INSECURITY: WITHIN THE PAST 12 MONTHS, YOU WORRIED THAT YOUR FOOD WOULD RUN OUT BEFORE YOU GOT MONEY TO BUY MORE.: NEVER TRUE

## 2024-07-29 ASSESSMENT — PATIENT HEALTH QUESTIONNAIRE - PHQ9
SUM OF ALL RESPONSES TO PHQ9 QUESTIONS 1 & 2: 0
1. LITTLE INTEREST OR PLEASURE IN DOING THINGS: NOT AT ALL
SUM OF ALL RESPONSES TO PHQ QUESTIONS 1-9: 0
2. FEELING DOWN, DEPRESSED OR HOPELESS: NOT AT ALL
SUM OF ALL RESPONSES TO PHQ QUESTIONS 1-9: 0

## 2024-07-29 NOTE — PROGRESS NOTES
1. Have you been to the ER, urgent care clinic since your last visit?  Hospitalized since your last visit?No    2. Have you seen or consulted any other health care providers outside of the [facility] since your last visit?  Include any pap smears or colon screening. No     Chief Complaint   Patient presents with    Back Pain         PHQ-9 Total Score: 0 (7/29/2024 11:11 AM)

## 2024-07-29 NOTE — PATIENT INSTRUCTIONS
[facility] Housing Resources*  (Call [employer]/211 if need more resources)      Homeless Connection Line (Local)  What they offer: The line facilitates access to resources and shelter alternatives for those who are currently homeless or 3 days or less away from losing their housing.  They also provide information for the inclement weather shelters when available.    Areas served: [place], [place], [place], [place], [place], [place], [place],  [facility]  Phone Number: [phone] **Contact this number first. It is a centralized point of entry for services.    Hours of Operation: Monday - Friday 8AM - 9PM; Saturday & Sunday 1PM - 9PM    Housing Resource Line  What they offer: Centralized line to help residents connect to programs and services that will help address their housing needs.            Needs addressed by HRL: Financial Assistance, Financial Education, Homebuyer Education, Emergency Assistance, Foreclosure Prevention, Legal Support, Locating Rental Options, Rehabs & Repairs, Ramps  Serve residents of [employer], [facility], [facility], [facility], [facility], [facility], [facility], [facility], and the [facility].  Website:    Phone Number: [phone]  Hours of Operation: Monday-Friday, 8:30AM-4:30PM     [facility]  What they offer: [employer] is the regional planning and coordinating agency for homeless services.  Website:  https://www.[employer].org/get-help  [facility] Street Sheets - Street Sheets are a valuable resource for those seeking resources. These sheets are located on the [employer] home page.  Phone Number: 133.393.8994     [facility] [place] [facility] Homeless - National Call Center  What they offer: Free 24/7 access to trained counselors for local resources and assistance  Website: https://www.[place]/nationalcallcenter.asp  Phone Number: 514.211.6970 (text messaging accepted)  Medicaid

## 2024-07-29 NOTE — PROGRESS NOTES
Brayden Monzon is a 59 y.o. male and presents with Back Pain  .  Subjective:  Hypertension Review:  The patient has essential hypertension  Diet and Lifestyle: generally follows a  low sodium diet, exercises sporadically  Home BP Monitoring: is not measured at home.  Pertinent ROS: taking medications as instructed, no medication side effects noted, no TIA's, no chest pain on exertion, no dyspnea on exertion, no swelling of ankles.     He states he has seen neurosurgery at [facility].  HE HAS NECK PAINS AND CERVICAL DISC HERNIATIONS    Erectile dysfunction Review::  Patient complains of difficulty in maintaining an adequate erection.  He states that his present medication is helping thus far.    HE STATES HE HAS A FOLLOW UP WITH UROLOGY.      Review of Systems  Constitutional: negative for fevers, chills, anorexia and weight loss  Eyes:   negative for visual disturbance and irritation  ENT:   negative for tinnitus,sore throat,nasal congestion,ear pains.hoarseness  Respiratory:  negative for cough, hemoptysis, dyspnea,wheezing  CV:   negative for chest pain, palpitations, lower extremity edema  GI:   negative for nausea, vomiting, diarrhea, abdominal pain,melena  Endo:               negative for polyuria,polydipsia,polyphagia,heat intolerance  Genitourinary: negative for frequency, dysuria and hematuria  Integument:  negative for rash and pruritus  Hematologic:  negative for easy bruising and gum/nose bleeding  Musculoskel:  myalgias,  back pain,joint pain  Neurological:  negative for headaches, dizziness, vertigo, memory problems and gait   Behavl/Psych: negative for feelings of anxiety, depression, mood changes    Past Medical History:   Diagnosis Date    Anxiety     anxiety    Chronic pain     back    Gastrointestinal disorder     GERD (gastroesophageal reflux disease)     H/O Bell's palsy     left side of face tingles, intermittently, not frequently    Hyperlipidemia     Hypertension     Meningitis 2012    spinal

## 2024-10-26 ENCOUNTER — HOSPITAL ENCOUNTER (EMERGENCY)
Facility: HOSPITAL | Age: 59
Discharge: HOME | End: 2024-10-26
Payer: MEDICAID

## 2024-10-26 VITALS
WEIGHT: 160 LBS | OXYGEN SATURATION: 100 % | BODY MASS INDEX: 23.7 KG/M2 | TEMPERATURE: 98.4 F | HEIGHT: 69 IN | DIASTOLIC BLOOD PRESSURE: 69 MMHG | SYSTOLIC BLOOD PRESSURE: 140 MMHG | HEART RATE: 73 BPM | RESPIRATION RATE: 18 BRPM

## 2024-10-26 DIAGNOSIS — K04.7 INFECTED DENTAL CARIES: Primary | ICD-10-CM

## 2024-10-26 DIAGNOSIS — K02.9 INFECTED DENTAL CARIES: Primary | ICD-10-CM

## 2024-10-26 PROCEDURE — 6370000000 HC RX 637 (ALT 250 FOR IP): Performed by: NURSE PRACTITIONER

## 2024-10-26 PROCEDURE — 99283 EMERGENCY DEPT VISIT LOW MDM: CPT

## 2024-10-26 RX ORDER — IBUPROFEN 800 MG/1
800 TABLET, FILM COATED ORAL EVERY 8 HOURS PRN
Qty: 30 TABLET | Refills: 1 | Status: SHIPPED | OUTPATIENT
Start: 2024-10-26

## 2024-10-26 RX ORDER — IBUPROFEN 400 MG/1
800 TABLET, FILM COATED ORAL
Status: COMPLETED | OUTPATIENT
Start: 2024-10-26 | End: 2024-10-26

## 2024-10-26 RX ORDER — PENICILLIN V POTASSIUM 500 MG/1
500 TABLET, FILM COATED ORAL 4 TIMES DAILY
Qty: 28 TABLET | Refills: 0 | Status: SHIPPED | OUTPATIENT
Start: 2024-10-26 | End: 2024-11-02

## 2024-10-26 RX ADMIN — IBUPROFEN 800 MG: 400 TABLET, FILM COATED ORAL at 17:05

## 2024-10-26 ASSESSMENT — PAIN - FUNCTIONAL ASSESSMENT: PAIN_FUNCTIONAL_ASSESSMENT: 0-10

## 2024-10-26 ASSESSMENT — ENCOUNTER SYMPTOMS
ABDOMINAL PAIN: 0
SHORTNESS OF BREATH: 0
BACK PAIN: 0

## 2024-10-26 ASSESSMENT — PAIN DESCRIPTION - DESCRIPTORS: DESCRIPTORS: THROBBING;PRESSURE

## 2024-10-26 ASSESSMENT — PAIN DESCRIPTION - ORIENTATION: ORIENTATION: RIGHT;UPPER

## 2024-10-26 ASSESSMENT — PAIN DESCRIPTION - LOCATION: LOCATION: TEETH

## 2024-10-26 ASSESSMENT — PAIN SCALES - GENERAL: PAINLEVEL_OUTOF10: 10

## 2024-10-26 NOTE — ED NOTES
Pt is alert and oriented x 4, RR even and unlabored, skin is warm and dry. Assesment completed and pt updated on plan of care.     Emergency Department Nursing Plan of Care       The Nursing Plan of Care is developed from the Nursing assessment and Emergency Department Attending provider initial evaluation.  The plan of care may be reviewed in the “ED Provider note”.    The Plan of Care was developed with the following considerations:   Patient / Family readiness to learn indicated by:verbalized understanding  Persons(s) to be included in education: patient  Barriers to Learning/Limitations:None    Signed     Kriss Almanza RN    10/26/2024   4:54 PM

## 2024-10-26 NOTE — ED PROVIDER NOTES
Resp: 18   Temp: 98.4 °F (36.9 °C)   TempSrc: Temporal   SpO2: 100%   Weight: 72.6 kg (160 lb)   Height: 1.753 m (5' 9\")        Patient was given the following medications:  Medications   ibuprofen (ADVIL;MOTRIN) tablet 800 mg (800 mg Oral Given 10/26/24 1705)       CONSULTS: (Who and What was discussed)  None    Chronic Conditions: GERD hypertension hyperlipidemia    ADDITIONAL CONSIDERATIONS:  None    RECORDS REVIEWED: Nursing Notes    CC/HPI Summary, DDx, ED Course, and Reassessment: 59-year-old male presents with dental pain for the past 3 days right upper molar tooth is broken states he has an appoint with a dentist for extraction next week.  Will prescribe antibiotic analgesic.         Disposition Considerations (Tests not done, Shared Decision Making, Pt Expectation of Test or Tx.):      FINAL IMPRESSION     1. Infected dental caries          DISPOSITION/PLAN   DISPOSITION Decision To Discharge 10/26/2024 04:56:52 PM           Discharge Note: The patient is stable for discharge home. The signs, symptoms, diagnosis, and discharge instructions have been discussed, understanding conveyed, and agreed upon. The patient is to follow up as recommended or return to ER should their symptoms worsen.      PATIENT REFERRED TO:  VIRGIL [address]  [address]  [facility]   In 1 week         DISCHARGE MEDICATIONS:     Medication List        START taking these medications      ibuprofen 800 MG tablet  Commonly known as: ADVIL;MOTRIN  Take 1 tablet by mouth every 8 hours as needed for Pain     penicillin v potassium 500 MG tablet  Commonly known as: VEETID  Take 1 tablet by mouth 4 times daily for 7 days            ASK your doctor about these medications      amLODIPine 2.5 MG tablet  Commonly known as: NORVASC  Take 1 tablet by mouth daily     clobetasol 0.05 % ointment  Commonly known as: TEMOVATE     diclofenac 75 MG EC tablet  Commonly known as: VOLTAREN  Take 1 tablet by mouth 2 times daily     methocarbamol 750 MG  tablet  Commonly known as: Robaxin-750  Si tab po tid prn spasms     * predniSONE 10 MG (48) Tbpk  Use as directed     * predniSONE 5 MG (21) Tbpk  SI TABS PO DAY ONE AND REDUCE BY 1 TAB DAILY(6,5,4,3,2,1)     sildenafil 100 MG tablet  Commonly known as: VIAGRA  Si tab po daily  prn daily           * This list has 2 medication(s) that are the same as other medications prescribed for you. Read the directions carefully, and ask your doctor or other care provider to review them with you.                   Where to Get Your Medications        These medications were sent to [facility] [id number] - [facility] [address] - P [phone] - F [phone] [address] [facility] [address]      Phone: [phone]   ibuprofen 800 MG tablet  penicillin v potassium 500 MG tablet           DISCONTINUED MEDICATIONS:  Current Discharge Medication List          I have seen and evaluated the patient autonomously. My supervision physician was on site and available for consultation if needed.     I am the Primary Clinician of Record.   LEONEL Vora - NP (electronically signed)    (Please note that parts of this dictation were completed with voice recognition software. Quite often unanticipated grammatical, syntax, homophones, and other interpretive errors are inadvertently transcribed by the computer software. Please disregards these errors. Please excuse any errors that have escaped final proofreading.)         Meghan Pizarro APRN - NP  10/26/24 [id number]

## 2024-10-26 NOTE — ED TRIAGE NOTES
Pt reports having R upper dental pain d/t a broken tooth and cavity. Pt reports having an appt scheduled to see the dentist next week. Pt states he took B/C PTA.

## 2024-10-29 ENCOUNTER — OFFICE VISIT (OUTPATIENT)
Facility: CLINIC | Age: 59
End: 2024-10-29
Payer: MEDICAID

## 2024-10-29 VITALS
HEIGHT: 69 IN | HEART RATE: 71 BPM | SYSTOLIC BLOOD PRESSURE: 124 MMHG | RESPIRATION RATE: 16 BRPM | BODY MASS INDEX: 23.55 KG/M2 | DIASTOLIC BLOOD PRESSURE: 66 MMHG | WEIGHT: 159 LBS | OXYGEN SATURATION: 97 % | TEMPERATURE: 98 F

## 2024-10-29 DIAGNOSIS — G56.03 BILATERAL CARPAL TUNNEL SYNDROME: ICD-10-CM

## 2024-10-29 DIAGNOSIS — I10 PRIMARY HYPERTENSION: ICD-10-CM

## 2024-10-29 DIAGNOSIS — M54.12 CERVICAL RADICULOPATHY: ICD-10-CM

## 2024-10-29 DIAGNOSIS — M50.20 CERVICAL DISC HERNIATION: Primary | ICD-10-CM

## 2024-10-29 PROCEDURE — 3074F SYST BP LT 130 MM HG: CPT | Performed by: INTERNAL MEDICINE

## 2024-10-29 PROCEDURE — 99214 OFFICE O/P EST MOD 30 MIN: CPT | Performed by: INTERNAL MEDICINE

## 2024-10-29 PROCEDURE — 3078F DIAST BP <80 MM HG: CPT | Performed by: INTERNAL MEDICINE

## 2024-10-29 SDOH — ECONOMIC STABILITY: INCOME INSECURITY: HOW HARD IS IT FOR YOU TO PAY FOR THE VERY BASICS LIKE FOOD, HOUSING, MEDICAL CARE, AND HEATING?: NOT HARD AT ALL

## 2024-10-29 SDOH — ECONOMIC STABILITY: FOOD INSECURITY: WITHIN THE PAST 12 MONTHS, THE FOOD YOU BOUGHT JUST DIDN'T LAST AND YOU DIDN'T HAVE MONEY TO GET MORE.: NEVER TRUE

## 2024-10-29 SDOH — ECONOMIC STABILITY: FOOD INSECURITY: WITHIN THE PAST 12 MONTHS, YOU WORRIED THAT YOUR FOOD WOULD RUN OUT BEFORE YOU GOT MONEY TO BUY MORE.: NEVER TRUE

## 2024-10-29 ASSESSMENT — PATIENT HEALTH QUESTIONNAIRE - PHQ9
SUM OF ALL RESPONSES TO PHQ QUESTIONS 1-9: 0
SUM OF ALL RESPONSES TO PHQ QUESTIONS 1-9: 0
SUM OF ALL RESPONSES TO PHQ9 QUESTIONS 1 & 2: 0
SUM OF ALL RESPONSES TO PHQ QUESTIONS 1-9: 0
2. FEELING DOWN, DEPRESSED OR HOPELESS: NOT AT ALL
SUM OF ALL RESPONSES TO PHQ QUESTIONS 1-9: 0
1. LITTLE INTEREST OR PLEASURE IN DOING THINGS: NOT AT ALL

## 2024-10-29 NOTE — PROGRESS NOTES
1. Have you been to the ER, urgent care clinic since your last visit?  Hospitalized since your last visit?No    2. Have you seen or consulted any other health care providers outside of the [facility] since your last visit?  Include any pap smears or colon screening. No     Chief Complaint   Patient presents with    Hypertension         PHQ-9 Total Score: 0 (10/29/2024 11:28 AM)

## 2024-10-29 NOTE — PROGRESS NOTES
Brayden Monzon is a 59 y.o. male and presents with Hypertension  .  Subjective:  Hypertension Review:  The patient has essential hypertension  Diet and Lifestyle: generally follows a  low sodium diet, exercises sporadically  Home BP Monitoring: is not measured at home.  Pertinent ROS: taking medications as instructed, no medication side effects noted, no TIA's, no chest pain on exertion, no dyspnea on exertion, no swelling of ankles.     He states he had a steroid injection recently    He has a history of a cervical radiculopathy    Carpal Tunnel Syndrome Review:  Patient presents for evaluation of pain in both hand(s), hand paresthesias of the first three fingers.  Onset of the symptoms was several months ago. Current symptoms include tingling and weakness involving the medial aspect of the bilateral hand(s): severity: moderate.  Patient's overall course: gradually worsening. Patient has had no prior elbow problems. Previous visits for this problem: none.  Evaluation to date: none.  Treatment to date: none.        Review of Systems  Constitutional: negative for fevers, chills, anorexia and weight loss  Eyes:   negative for visual disturbance and irritation  ENT:   negative for tinnitus,sore throat,nasal congestion,ear pains.hoarseness  Respiratory:  negative for cough, hemoptysis, dyspnea,wheezing  CV:   negative for chest pain, palpitations, lower extremity edema  GI:   negative for nausea, vomiting, diarrhea, abdominal pain,melena  Endo:               negative for polyuria,polydipsia,polyphagia,heat intolerance  Genitourinary: negative for frequency, dysuria and hematuria  Integument:  negative for rash and pruritus  Hematologic:  negative for easy bruising and gum/nose bleeding  Musculoskel: negative for myalgias, arthralgias, back pain, muscle weakness, joint pain  Neurological:  negative for headaches, dizziness, vertigo, memory problems and gait   Behavl/Psych: negative for feelings of anxiety,

## 2025-01-29 ENCOUNTER — OFFICE VISIT (OUTPATIENT)
Facility: CLINIC | Age: 60
End: 2025-01-29

## 2025-01-29 VITALS
TEMPERATURE: 98 F | WEIGHT: 169 LBS | OXYGEN SATURATION: 99 % | BODY MASS INDEX: 25.03 KG/M2 | HEIGHT: 69 IN | SYSTOLIC BLOOD PRESSURE: 110 MMHG | RESPIRATION RATE: 16 BRPM | HEART RATE: 77 BPM | DIASTOLIC BLOOD PRESSURE: 80 MMHG

## 2025-01-29 DIAGNOSIS — I10 PRIMARY HYPERTENSION: Primary | ICD-10-CM

## 2025-01-29 DIAGNOSIS — M51.06 LUMBAR DISC DISORDER WITH MYELOPATHY: ICD-10-CM

## 2025-01-29 DIAGNOSIS — M50.90 CERVICAL DISC DISEASE: ICD-10-CM

## 2025-01-29 PROCEDURE — 3079F DIAST BP 80-89 MM HG: CPT | Performed by: INTERNAL MEDICINE

## 2025-01-29 PROCEDURE — 3074F SYST BP LT 130 MM HG: CPT | Performed by: INTERNAL MEDICINE

## 2025-01-29 PROCEDURE — 99214 OFFICE O/P EST MOD 30 MIN: CPT | Performed by: INTERNAL MEDICINE

## 2025-01-29 SDOH — ECONOMIC STABILITY: FOOD INSECURITY: WITHIN THE PAST 12 MONTHS, YOU WORRIED THAT YOUR FOOD WOULD RUN OUT BEFORE YOU GOT MONEY TO BUY MORE.: NEVER TRUE

## 2025-01-29 SDOH — ECONOMIC STABILITY: FOOD INSECURITY: WITHIN THE PAST 12 MONTHS, THE FOOD YOU BOUGHT JUST DIDN'T LAST AND YOU DIDN'T HAVE MONEY TO GET MORE.: NEVER TRUE

## 2025-01-29 ASSESSMENT — PATIENT HEALTH QUESTIONNAIRE - PHQ9
SUM OF ALL RESPONSES TO PHQ QUESTIONS 1-9: 0
SUM OF ALL RESPONSES TO PHQ9 QUESTIONS 1 & 2: 0
SUM OF ALL RESPONSES TO PHQ QUESTIONS 1-9: 0
SUM OF ALL RESPONSES TO PHQ QUESTIONS 1-9: 0
1. LITTLE INTEREST OR PLEASURE IN DOING THINGS: NOT AT ALL
SUM OF ALL RESPONSES TO PHQ QUESTIONS 1-9: 0
2. FEELING DOWN, DEPRESSED OR HOPELESS: NOT AT ALL
SUM OF ALL RESPONSES TO PHQ QUESTIONS 1-9: 0
SUM OF ALL RESPONSES TO PHQ QUESTIONS 1-9: 0
1. LITTLE INTEREST OR PLEASURE IN DOING THINGS: NOT AT ALL
SUM OF ALL RESPONSES TO PHQ QUESTIONS 1-9: 0
SUM OF ALL RESPONSES TO PHQ9 QUESTIONS 1 & 2: 0
SUM OF ALL RESPONSES TO PHQ QUESTIONS 1-9: 0
2. FEELING DOWN, DEPRESSED OR HOPELESS: NOT AT ALL

## 2025-01-29 NOTE — PROGRESS NOTES
1. Have you been to the ER, urgent care clinic since your last visit?  Hospitalized since your last visit?No    2. Have you seen or consulted any other health care providers outside of the [facility] since your last visit?  Include any pap smears or colon screening. No     Chief Complaint   Patient presents with    Cholesterol Problem    Hypertension    Gastroesophageal Reflux    Back Pain         PHQ-9 Total Score: 0 (1/29/2025 11:41 AM)

## 2025-01-29 NOTE — PROGRESS NOTES
Brayden Monzon is a 59 y.o. male and presents with Cholesterol Problem, Hypertension, Gastroesophageal Reflux, and Back Pain  .  Subjective:  Shoulder Pain Review:  Patient complains of left side shoulder pain. The symptoms began several weeks ago Course of symptoms since onset has been gradually worsening. Pain is described as overall severity = moderate. Symptoms were incited by no known event.  Patient denies N/A. Therapy to date includes OTC analgesics: effective.    Back Pain Review:  Patient presents for follow-up evaluation of low back problems.  Symptoms have been present for months and include pain in lower back (dull, mild in character; 8/10 in severity). Initial inciting event: unknown. Symptoms are worst: at times. Alleviating factors identifiable by patient are lying flat, medication . Exacerbating factors identifiable by patient are bending forwards, bending backwards. Treatments so far initiated by patient: medication Previous lower back problems: reported. Previous workup: x-rays/injection    Hypertension Review:  The patient has essential hypertension  Diet and Lifestyle: generally follows a  low sodium diet, exercises sporadically  Home BP Monitoring: is not measured at home.  Pertinent ROS: taking medications as instructed, no medication side effects noted, no TIA's, no chest pain on exertion, no dyspnea on exertion, no swelling of ankles.   .       Review of Systems  Constitutional: negative for fevers, chills, anorexia and weight loss  Eyes:   negative for visual disturbance and irritation  ENT:   negative for tinnitus,sore throat,nasal congestion,ear pains.hoarseness  Respiratory:  negative for cough, hemoptysis, dyspnea,wheezing  CV:   negative for chest pain, palpitations, lower extremity edema  GI:   negative for nausea, vomiting, diarrhea, abdominal pain,melena  Endo:               negative for polyuria,polydipsia,polyphagia,heat intolerance  Genitourinary: negative for frequency,

## 2025-02-13 ENCOUNTER — HOSPITAL ENCOUNTER (OUTPATIENT)
Facility: HOSPITAL | Age: 60
Discharge: HOME | End: 2025-02-16
Attending: INTERNAL MEDICINE
Payer: MEDICAID

## 2025-02-13 DIAGNOSIS — M51.06 LUMBAR DISC DISORDER WITH MYELOPATHY: ICD-10-CM

## 2025-02-13 PROCEDURE — 72100 X-RAY EXAM L-S SPINE 2/3 VWS: CPT

## 2025-03-13 ENCOUNTER — OFFICE VISIT (OUTPATIENT)
Age: 60
End: 2025-03-13

## 2025-03-13 VITALS
HEART RATE: 73 BPM | DIASTOLIC BLOOD PRESSURE: 78 MMHG | OXYGEN SATURATION: 96 % | TEMPERATURE: 98.2 F | SYSTOLIC BLOOD PRESSURE: 137 MMHG | RESPIRATION RATE: 18 BRPM | BODY MASS INDEX: 24.07 KG/M2 | WEIGHT: 163 LBS

## 2025-03-13 DIAGNOSIS — R22.32 SUBCUTANEOUS NODULE OF LEFT FOREARM: ICD-10-CM

## 2025-03-13 DIAGNOSIS — M54.12 CERVICAL RADICULOPATHY: Primary | ICD-10-CM

## 2025-03-13 RX ORDER — KETOROLAC TROMETHAMINE 30 MG/ML
30 INJECTION, SOLUTION INTRAMUSCULAR; INTRAVENOUS ONCE
Status: COMPLETED | OUTPATIENT
Start: 2025-03-13 | End: 2025-03-13

## 2025-03-13 RX ADMIN — KETOROLAC TROMETHAMINE 30 MG: 30 INJECTION, SOLUTION INTRAMUSCULAR; INTRAVENOUS at 20:06

## 2025-03-13 NOTE — PROGRESS NOTES
List   Diagnosis    Tobacco abuse    Anxiety    Depression with anxiety    Bunion    GERD (gastroesophageal reflux disease)    Hypertension    Hyperlipidemia    Chronic pain    Meningitis    Hypopotassemia            I ADVISED PATIENT TO GO TO ER IF SYMPTOMS WORSEN , CHANGE OR FAILS TO IMPROVE.    I have discussed the diagnosis with the patient and the intended plan as seen in the above orders.  The patient has received an after-visit summary and questions were answered concerning future plans.  I have discussed medication side effects and warnings with the patient as well. The patient agrees and understands above plan.       An electronic signature was used to authenticate this note.  -- Srinivasan Correa MD

## 2025-03-14 NOTE — PATIENT INSTRUCTIONS
Exam and history consistent with cervical radiculopathy and subcutaneous nodule on the left forearm.   -Exam reassuring for no signs of cancerous nodule. It is rubbery, well circumscribed, and mobile  -Toradol 30 mg given once in clinic   -Please drink at least 64 ounces of fluid a day   -Ice the affected area for 15 minutes at a time as needed   -Ibuprofen 600 mg every 6 hours as needed and Tylenol 500 mg every 6 hours as needed for pain control. Best if used in combination  -Please follow up with your PCP in the next 2 to 3 weeks     If symptoms persist or worsen, please contact your PCP and/or return to Urgent Care.

## 2025-03-14 NOTE — TELEPHONE ENCOUNTER
Last appointment: 01/29/2025 MD Singleton   Next appointment: 04/30/2025 MD Singleton   Previous refill encounter(s):   02/26/2024 Norvasc #30 with 12 refills.     For Pharmacy Admin Tracking Only    Program: Medication Refill  Intervention Detail: New Rx: 1, reason: Patient Preference  Time Spent (min): 5    Requested Prescriptions     Pending Prescriptions Disp Refills    amLODIPine (NORVASC) 2.5 MG tablet [Pharmacy Med Name: AMLODIPINE BESYLATE 2.5 MG TAB] 90 tablet 0     Sig: TAKE 1 TABLET BY MOUTH DAILY

## 2025-03-16 RX ORDER — AMLODIPINE BESYLATE 2.5 MG/1
2.5 TABLET ORAL DAILY
Qty: 90 TABLET | Refills: 0 | Status: SHIPPED | OUTPATIENT
Start: 2025-03-16

## 2025-03-22 ENCOUNTER — HOSPITAL ENCOUNTER (EMERGENCY)
Facility: HOSPITAL | Age: 60
Discharge: HOME | End: 2025-03-22
Payer: MEDICAID

## 2025-03-22 ENCOUNTER — APPOINTMENT (OUTPATIENT)
Facility: HOSPITAL | Age: 60
End: 2025-03-22
Payer: MEDICAID

## 2025-03-22 VITALS
HEIGHT: 68 IN | SYSTOLIC BLOOD PRESSURE: 129 MMHG | RESPIRATION RATE: 16 BRPM | TEMPERATURE: 98.2 F | BODY MASS INDEX: 24.25 KG/M2 | WEIGHT: 160 LBS | OXYGEN SATURATION: 98 % | DIASTOLIC BLOOD PRESSURE: 93 MMHG | HEART RATE: 70 BPM

## 2025-03-22 DIAGNOSIS — S16.1XXA STRAIN OF NECK MUSCLE, INITIAL ENCOUNTER: Primary | ICD-10-CM

## 2025-03-22 PROCEDURE — 99283 EMERGENCY DEPT VISIT LOW MDM: CPT

## 2025-03-22 PROCEDURE — 6370000000 HC RX 637 (ALT 250 FOR IP): Performed by: NURSE PRACTITIONER

## 2025-03-22 PROCEDURE — 72040 X-RAY EXAM NECK SPINE 2-3 VW: CPT

## 2025-03-22 RX ORDER — CYCLOBENZAPRINE HCL 10 MG
10 TABLET ORAL 3 TIMES DAILY PRN
Qty: 21 TABLET | Refills: 0 | Status: SHIPPED | OUTPATIENT
Start: 2025-03-22 | End: 2025-04-01

## 2025-03-22 RX ORDER — DIAZEPAM 5 MG/1
5 TABLET ORAL EVERY 6 HOURS PRN
Status: DISCONTINUED | OUTPATIENT
Start: 2025-03-22 | End: 2025-03-22

## 2025-03-22 RX ORDER — NAPROXEN 500 MG/1
500 TABLET ORAL 2 TIMES DAILY
Qty: 30 TABLET | Refills: 0 | Status: SHIPPED | OUTPATIENT
Start: 2025-03-22

## 2025-03-22 RX ORDER — IBUPROFEN 400 MG/1
800 TABLET, FILM COATED ORAL
Status: COMPLETED | OUTPATIENT
Start: 2025-03-22 | End: 2025-03-22

## 2025-03-22 RX ORDER — DIAZEPAM 5 MG/1
5 TABLET ORAL ONCE
Status: COMPLETED | OUTPATIENT
Start: 2025-03-22 | End: 2025-03-22

## 2025-03-22 RX ADMIN — DIAZEPAM 5 MG: 5 TABLET ORAL at 12:33

## 2025-03-22 RX ADMIN — IBUPROFEN 800 MG: 400 TABLET, FILM COATED ORAL at 12:33

## 2025-03-22 ASSESSMENT — PAIN DESCRIPTION - DESCRIPTORS: DESCRIPTORS: SHARP

## 2025-03-22 ASSESSMENT — PAIN - FUNCTIONAL ASSESSMENT: PAIN_FUNCTIONAL_ASSESSMENT: 0-10

## 2025-03-22 ASSESSMENT — PAIN DESCRIPTION - LOCATION
LOCATION: NECK;SHOULDER;ARM
LOCATION: NECK

## 2025-03-22 ASSESSMENT — ENCOUNTER SYMPTOMS
SHORTNESS OF BREATH: 0
BACK PAIN: 0
ABDOMINAL PAIN: 0

## 2025-03-22 ASSESSMENT — PAIN SCALES - GENERAL
PAINLEVEL_OUTOF10: 10
PAINLEVEL_OUTOF10: 8

## 2025-03-22 ASSESSMENT — PAIN DESCRIPTION - PAIN TYPE: TYPE: ACUTE PAIN

## 2025-03-22 ASSESSMENT — PAIN DESCRIPTION - ORIENTATION: ORIENTATION: LEFT

## 2025-03-22 NOTE — ED TRIAGE NOTES
Patient reports he was taking off his shirt this morning when he heard a \"pop\" and now has left sided neck, shoulder and arm pain.

## 2025-03-23 NOTE — ED PROVIDER NOTES
[facility] EMERGENCY DEPARTMENT  EMERGENCY DEPARTMENT ENCOUNTER       Pt Name: Brayden Monzon  MRN: [id number]  Birthdate 1965  Date of evaluation: 3/22/2025  Provider: LEONEL Vora - KRISH   PCP: Keegan Singleton Jr., MD  Note Started: 8:59 PM 3/22/25     CHIEF COMPLAINT       Chief Complaint   Patient presents with    Neck Pain        HISTORY OF PRESENT ILLNESS: 1 or more elements      History Provided by: Patient   History is limited by: Nothing     Brayden Monzon is a 59 y.o. male who presents cc pain left side of neck.  States he woke up this morning and felt a pulling sensation in the left side of his neck.  He denies injury.  He reports pain when he turns his neck to the left side.  He states he is concerned because he has been diagnosed with a disc problem in his neck.     Nursing Notes were all reviewed and agreed with or any disagreements were addressed in the HPI.     REVIEW OF SYSTEMS      Review of Systems   Constitutional:  Negative for fever.   HENT:  Negative for congestion.    Respiratory:  Negative for shortness of breath.    Cardiovascular:  Negative for chest pain.   Gastrointestinal:  Negative for abdominal pain.   Genitourinary:  Negative for difficulty urinating.   Musculoskeletal:  Positive for neck pain. Negative for back pain.   Skin:  Negative for rash.   Neurological:  Negative for dizziness, weakness and headaches.   All other systems reviewed and are negative.       Positives and Pertinent negatives as per HPI.    PAST HISTORY     Past Medical History:  Past Medical History:   Diagnosis Date    Anxiety     anxiety    Chronic pain     back    Gastrointestinal disorder     GERD (gastroesophageal reflux disease)     H/O Bell's palsy     left side of face tingles, intermittently, not frequently    Hyperlipidemia     Hypertension     Meningitis 2012    spinal meningitis  ; resolved as of 4/3/14    Positive PPD     CXR have been negative per pt 4/4/14    Psychiatric

## 2025-05-08 ENCOUNTER — OFFICE VISIT (OUTPATIENT)
Facility: CLINIC | Age: 60
End: 2025-05-08
Payer: MEDICAID

## 2025-05-08 VITALS
HEIGHT: 68 IN | HEART RATE: 71 BPM | SYSTOLIC BLOOD PRESSURE: 120 MMHG | RESPIRATION RATE: 16 BRPM | OXYGEN SATURATION: 99 % | DIASTOLIC BLOOD PRESSURE: 80 MMHG | TEMPERATURE: 98 F | BODY MASS INDEX: 24.4 KG/M2 | WEIGHT: 161 LBS

## 2025-05-08 DIAGNOSIS — M50.20 CERVICAL DISC HERNIATION: Primary | ICD-10-CM

## 2025-05-08 DIAGNOSIS — I10 ESSENTIAL (PRIMARY) HYPERTENSION: ICD-10-CM

## 2025-05-08 LAB
ALBUMIN SERPL-MCNC: 4 G/DL (ref 3.5–5)
ALBUMIN/GLOB SERPL: 1 (ref 1.1–2.2)
ALP SERPL-CCNC: 78 U/L (ref 45–117)
ALT SERPL-CCNC: 17 U/L (ref 12–78)
ANION GAP SERPL CALC-SCNC: 3 MMOL/L (ref 2–12)
AST SERPL-CCNC: 22 U/L (ref 15–37)
BILIRUB SERPL-MCNC: 0.6 MG/DL (ref 0.2–1)
BUN SERPL-MCNC: 14 MG/DL (ref 6–20)
BUN/CREAT SERPL: 13 (ref 12–20)
CALCIUM SERPL-MCNC: 9.4 MG/DL (ref 8.5–10.1)
CHLORIDE SERPL-SCNC: 104 MMOL/L (ref 97–108)
CO2 SERPL-SCNC: 30 MMOL/L (ref 21–32)
CREAT SERPL-MCNC: 1.06 MG/DL (ref 0.7–1.3)
ERYTHROCYTE [DISTWIDTH] IN BLOOD BY AUTOMATED COUNT: 14 % (ref 11.5–14.5)
GLOBULIN SER CALC-MCNC: 4.1 G/DL (ref 2–4)
GLUCOSE SERPL-MCNC: 90 MG/DL (ref 65–100)
HCT VFR BLD AUTO: 43.1 % (ref 36.6–50.3)
HGB BLD-MCNC: 13.8 G/DL (ref 12.1–17)
MCH RBC QN AUTO: 27.4 PG (ref 26–34)
MCHC RBC AUTO-ENTMCNC: 32 G/DL (ref 30–36.5)
MCV RBC AUTO: 85.7 FL (ref 80–99)
NRBC # BLD: 0 K/UL (ref 0–0.01)
NRBC BLD-RTO: 0 PER 100 WBC
PLATELET # BLD AUTO: 213 K/UL (ref 150–400)
PMV BLD AUTO: 10.8 FL (ref 8.9–12.9)
POTASSIUM SERPL-SCNC: 4.8 MMOL/L (ref 3.5–5.1)
PROT SERPL-MCNC: 8.1 G/DL (ref 6.4–8.2)
RBC # BLD AUTO: 5.03 M/UL (ref 4.1–5.7)
SODIUM SERPL-SCNC: 137 MMOL/L (ref 136–145)
WBC # BLD AUTO: 6.2 K/UL (ref 4.1–11.1)

## 2025-05-08 PROCEDURE — 3079F DIAST BP 80-89 MM HG: CPT | Performed by: INTERNAL MEDICINE

## 2025-05-08 PROCEDURE — 3074F SYST BP LT 130 MM HG: CPT | Performed by: INTERNAL MEDICINE

## 2025-05-08 PROCEDURE — 99214 OFFICE O/P EST MOD 30 MIN: CPT | Performed by: INTERNAL MEDICINE

## 2025-05-08 RX ORDER — TADALAFIL 20 MG/1
20 TABLET ORAL DAILY PRN
Qty: 30 TABLET | Refills: 1 | Status: SHIPPED | OUTPATIENT
Start: 2025-05-08

## 2025-05-08 SDOH — ECONOMIC STABILITY: FOOD INSECURITY: WITHIN THE PAST 12 MONTHS, YOU WORRIED THAT YOUR FOOD WOULD RUN OUT BEFORE YOU GOT MONEY TO BUY MORE.: NEVER TRUE

## 2025-05-08 SDOH — ECONOMIC STABILITY: FOOD INSECURITY: WITHIN THE PAST 12 MONTHS, THE FOOD YOU BOUGHT JUST DIDN'T LAST AND YOU DIDN'T HAVE MONEY TO GET MORE.: NEVER TRUE

## 2025-05-08 ASSESSMENT — ANXIETY QUESTIONNAIRES
1. FEELING NERVOUS, ANXIOUS, OR ON EDGE: NOT AT ALL
7. FEELING AFRAID AS IF SOMETHING AWFUL MIGHT HAPPEN: NOT AT ALL
5. BEING SO RESTLESS THAT IT IS HARD TO SIT STILL: NOT AT ALL
GAD7 TOTAL SCORE: 0
3. WORRYING TOO MUCH ABOUT DIFFERENT THINGS: NOT AT ALL
2. NOT BEING ABLE TO STOP OR CONTROL WORRYING: NOT AT ALL
4. TROUBLE RELAXING: NOT AT ALL
IF YOU CHECKED OFF ANY PROBLEMS ON THIS QUESTIONNAIRE, HOW DIFFICULT HAVE THESE PROBLEMS MADE IT FOR YOU TO DO YOUR WORK, TAKE CARE OF THINGS AT HOME, OR GET ALONG WITH OTHER PEOPLE: NOT DIFFICULT AT ALL
6. BECOMING EASILY ANNOYED OR IRRITABLE: NOT AT ALL

## 2025-05-08 ASSESSMENT — PATIENT HEALTH QUESTIONNAIRE - PHQ9
7. TROUBLE CONCENTRATING ON THINGS, SUCH AS READING THE NEWSPAPER OR WATCHING TELEVISION: NOT AT ALL
SUM OF ALL RESPONSES TO PHQ QUESTIONS 1-9: 0
9. THOUGHTS THAT YOU WOULD BE BETTER OFF DEAD, OR OF HURTING YOURSELF: NOT AT ALL
6. FEELING BAD ABOUT YOURSELF - OR THAT YOU ARE A FAILURE OR HAVE LET YOURSELF OR YOUR FAMILY DOWN: NOT AT ALL
4. FEELING TIRED OR HAVING LITTLE ENERGY: NOT AT ALL
3. TROUBLE FALLING OR STAYING ASLEEP: NOT AT ALL
5. POOR APPETITE OR OVEREATING: NOT AT ALL
1. LITTLE INTEREST OR PLEASURE IN DOING THINGS: NOT AT ALL
2. FEELING DOWN, DEPRESSED OR HOPELESS: NOT AT ALL
10. IF YOU CHECKED OFF ANY PROBLEMS, HOW DIFFICULT HAVE THESE PROBLEMS MADE IT FOR YOU TO DO YOUR WORK, TAKE CARE OF THINGS AT HOME, OR GET ALONG WITH OTHER PEOPLE: NOT DIFFICULT AT ALL
SUM OF ALL RESPONSES TO PHQ QUESTIONS 1-9: 0
8. MOVING OR SPEAKING SO SLOWLY THAT OTHER PEOPLE COULD HAVE NOTICED. OR THE OPPOSITE, BEING SO FIGETY OR RESTLESS THAT YOU HAVE BEEN MOVING AROUND A LOT MORE THAN USUAL: NOT AT ALL
SUM OF ALL RESPONSES TO PHQ QUESTIONS 1-9: 0
SUM OF ALL RESPONSES TO PHQ QUESTIONS 1-9: 0

## 2025-05-08 NOTE — ADDENDUM NOTE
Pediatrics [facility] General Genetics Evaluation - New Patient       Genetics History:  Pam Gorman is a 11 y.o. old male who is sent for consultation at the request of Jyothi Calixto MD for genetics evaluation of hypermobility, dislocations and fractures. This patient was seen in [facility]etics [facility] by physician Dr. Irish Mckeon and Genetic counselor Mayte Garcia. The patient was accompanied to clinic by both parents and younger sister.    Pam Gorman  is a 11 y.o. male with concerns for hypermobility/EDS. Pam has had several joint dislocations without trauma. He has dislocated his L elbow 5 times, and his R elbow 1 time most recently while he was putting socks on. Pam recently had a right elbow dislocation with olecranon fracture that occurred on 2025 after falling off of monkey bars. He also broke his thumb playing basketball and his L arm as a toddler when he tripped over his mother's foot. He recently chipped several teeth on the trampoline when he collided with his sister. Pam reports feeling pain in his thighs and calves. He also has trouble sleeping.      Pam saw cardiology (Dr. Weiland) in 2025 and had a normal echo and EKG.      Pam saw neurology (Dr. Bonilla) on 2025 due to headaches, about 10 per month lasting an hour to half a day. He was diagnosed with episodic migraines.      Pam has seen GI (Dr. Castro) several times at 3mo, 3yo and 6yo for episodic vomiting, spitting up and poor weight gain.      Mom endorses slow wound healing for Pam.     Previous Genetic Testing:   None    Review of systems:   Complete ROS performed and otherwise negative except as noted above in the history (systems covered: General, Eyes, ENT, CV, Resp, GI, , MSK, Derm, Neuro, Psych, Endo, Heme/lymph, Allergic / Immunologic).    Histories:    Birth History:  Pam was born at 39 weeks via  to a 21 year old  mother and a 22  Addended by: Kelli Vaughn on: 11/3/2022 04:00 PM     Modules accepted: Orders year old father in [place]. There were no exposures to medications, alcohol, tobacco or illicit drugs during the pregnancy. No complications during the pregnancy. No delivery complications. Birth weight was 3.26 kg (7 lb 3 oz), birth length was 0.495cm. There were no  complications. Discharged home with his mother from the hospital after 4 days, no NICU stay. NBS and NBHS were normal.       Past Medical History:  Past Medical History:   Diagnosis Date    FTT (failure to thrive) in infant     GERD (gastroesophageal reflux disease)     Reactive airway disease     Recurrent dislocation of left upper arm 2022    Vomiting         Past Surgical History:  Past Surgical History:   Procedure Laterality Date    CIRCUMCISION      ESOPHAGOGASTRODUODENOSCOPY N/A 2018    Procedure: (EGD);  Surgeon: Abhishek Castro MD;  Location: [facility] ([address] [place] [address]);  Service: Endoscopy;  Laterality: N/A;    MAGNETIC RESONANCE IMAGING N/A 2021    Procedure: MRI (Magnetic Resonance Imagine) - Brain;  Surgeon: Rubén Pulido MD;  Location: [facility];  Service: Anesthesiology;  Laterality: N/A;    MAGNETIC RESONANCE IMAGING N/A 3/28/2023    Procedure: MRI (Magnetic Resonance Imagine);  Surgeon: Rubén Pulido MD;  Location: [facility];  Service: Anesthesiology;  Laterality: N/A;  arthrogram with anesthesia in MRI and fluro       Development History:  Gross Motor:  Pull to stand: 6mo  Walkinmo     Visual Motor/Fine Motor:  No concerns     Speech and language:  First words: <8mo  Sentences: 12mo     Social:  Autism/ADD evaluation: No concerns     Therapy: PT following dislocations  School/: Pam is in 4th grade, he has a D in math but has As/Bs in ever other class. Parents report no concerns about his performance.         Family History: Pedigree in chart  Pam has two full sisters, one 3yo who is healthy and one who is 4yo and is double jointed. Pam's mother is 30yo and has asthma, hypermobility,  IBD, several dislocations, and repeated fractures (right arm x1, left arm x5). Pam has one maternal half uncle who is 27yo and has HTN. One maternal cousin is 2yo and healthy. PHILL is 49yo and has HTN, heart disease, migraines and rheumatoid arthritis. No information for MGF. Pam's father is 31yo and healthy. Pam has three paternal uncles, one who has an irregular heart rate. Pam has one paternal aunt with anemia. Pam has several paternal cousins one with autism and one with autism and short stature. PGM is in her 40's and HTN. Limited information on PGF, he is in his 40's.       3-generation family history obtained and otherwise negative for history of intellectual disability/developmental delay, birth defects, or 3 or more miscarriages unless otherwise noted above. Consanguinity denied.     Social History:  Lives with mom, dad and sister. The family resides in [place], [place].     Allergies:  Review of patient's allergies indicates:  No Known Allergies      Medications:  Current Medications[1]      Physical exam:        Measurements:  Height: 137cm (42%ile)  Weight: 26.1kg (11.08%ile)  Arm span:  135cm  Arm span:height ratio: 0.98(considered increased when it's >1.05)     Upper segment length: 64cm  Lower segment length: 73cm  Upper segment:lower segment ratio: 0.88(considered decreased when it's <0.85)       Examination:  General:  Alert and oriented, No acute distress.    Behavior: Within normal limits.    Skin: Normal for ethnicity, soft and mildly hyperextensible;  1 hyperpigmented macule in the posterior left left; hair is normal in texture and distribution       Craniofacial exam:         - Normal head shape        - Normal hair pattern, distribution and texture         - Eyes are symmetric with normal spacing and shape; bluish sclerae; eyelashes are normal        - Ears: normal in appearance and placement        - Nose: normal appearance, with normal philtrum        - Mouth: normal shape; normal lips;  high arched; some broken teeth    Neck:  Supple  Chest:  Normal appearance, no pectus. Nipples are normal in appearance and placement.    Cardiovascular: RRR without murmur  Respiratory:  Respirations are non-labored.    Abdomen: Soft  Mobility/gait: within normal limits; appropriate for age.   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   Lower extremity exam: normal, toes appear normal with normal toe nails.   Neurologic: No focal deficits noted  Musculoskeletal:  No obvious deformity.    Beighton Score: 9 Total Possible Points  1. Passive dorsiflexion of 5th MCP joint past 90° (1 point per side): 1 right, 1 left  2. Passive apposition of thumb to flexor aspect of forearm (1 point per side): 1 right, 1 left   3. Passive hyperextension of elbows past 10° (1 point per side): 1 left, 1 right (reportedly 20 degree angle but currently limited post cast removal)  4. Passive hyperextension of knees past 10° (1 point per side): 1 left, 0 right  5. Able to rest palms of hands on floor while keeping knees extended (1 point): 0  Total: 6-7/9      Diagnostic Studies Reviewed:  Echo 4/2025:  No cardiac disease identified.  Normal echocardiogram for age.      EKG 4/29/25  Pediatric ECG Analysis       Normal sinus rhythm   Normal ECG       Assessment:  Pam is a 11 y.o. old male who was evaluated today in genetics clinic due to hypermobility, multiple dislocations and fractures.    He has multiple bilateral elbow dislocation. He had thumb fracture, L arm fracture, and most recently olecranon fracture, all with trauma (sometimes minor trauma). Family history is significant for joint hypermobility, dislocations and fractures in his mother. He had normal Echo and EKG. He had history of episodic migraine and follows with Neurology.  Physical exam revealed hypermobility with a Beighton score of 6-7/9 and features as noted  above.    Discussion:  Connective tissue disorders are a group of conditions that affect the tissues supporting, binding, or  other tissues and organs. These disorders can be inherited or acquired and often involve abnormalities in collagen or other structural proteins.  Sukhdeep-Danlos Syndrome (EDS) is commonly linked to mutations in genes affecting collagen, such as COL5A1 and COL3A1, leading to its characteristic hypermobility and skin changes. Marfan Syndrome is primarily caused by mutations in the FBN1 gene, affecting fibrillin-1, and resulting in cardiovascular, ocular, and skeletal abnormalities. Osteogenesis Imperfecta, known for brittle bones, is often due to mutations in the COL1A1 and COL1A2 genes. Other conditions, such as Loeys-Kyle syndrome, involve mutations in the TGFBR1 or TGFBR2 genes, leading to vascular and skeletal manifestations.     Evaluation for hypermobility can start at the age of 5 years. This is based on the clinical diagnostic classification system for pediatric generalized joint hypermobility (pGJH) and pediatric generalized hypermobility spectrum disorder (pgHSD) which assesses joint hypermobility in individuals aged 5 through 18 years or until biological maturity. The Beighton score, a commonly used tool for assessing joint hypermobility, has been adapted for use in children younger than 5 years, but the formal evaluation and classification typically begin at age 5. This approach ensures that the assessment is appropriate for the developmental stage of the child and allows for the monitoring of evolving clinical manifestations.    The Beighton score is the most widely used tool for assessing GJH in children. A Beighton score of >=6/9 is recommended to identify children with GJH, as this threshold is set at 2 standard deviations above the average based on general population data.    Discussed sending [employer] connective tissue (also includes COL1A1 and COL1A2 genes). Pre-test  genetic counseling was completed by Mayte Garcia CGC. If results are negative/inconclusive, can consider bone fragility panel given the personal and family history of fractures. Family verbalized understanding and all questions/concerns were addressed.        Plan:  - [employer] Connective Tissue Disorders Panel   - If results is inconclusive, can consider [employer] Osteogenesis Imperfecta and Bone Fragility Panel in the future.        References:  - https://www.ncbi.nlm.nih.gov/books/ZZK3626/  - https://pubmed.ncbi.nlm.nih.gov/77982913/        Face to Face time with patient: 20 minutes  90 minutes of total time spent on the encounter, which includes face to face time and non-face to face time preparing to see the patient (eg, review of tests), Obtaining and/or reviewing separately obtained history, Documenting clinical information in the electronic or other health record, Independently interpreting results (not separately reported) and communicating results to the patient/family/caregiver, or Care coordination (not separately reported).         Irish Mckeon MD  Medical Genetics  [facility]      It was a pleasure to see Pam today.  We would like to see him back in Genetics clinic pending results of the workup above.. Should any questions or concerns arise following today's visit, we encourage the family to contact the Genetics Office.              [1]   Current Outpatient Medications:     acetaminophen (TYLENOL) 100 mg/mL suspension, Take by mouth every 4 (four) hours as needed for Temperature greater than., Disp: , Rfl:     albuterol (PROVENTIL) 2.5 mg /3 mL (0.083 %) nebulizer solution, Take 3 mLs (2.5 mg total) by nebulization every 4 (four) hours as needed for Wheezing or Shortness of Breath. (Patient not taking: Reported on 4/29/2025), Disp: 75 each, Rfl: 0    albuterol (PROVENTIL/VENTOLIN HFA) 90 mcg/actuation inhaler, INHALE TWO PUFFS BY MOUTH EVERY 4 HOURS AS NEEDED FOR COUGH / WHEEZE,  Disp: 8.5 g, Rfl: 1    magnesium oxide 200 mg magnesium Tab, Take 200 mg by mouth once daily. (Patient not taking: Reported on 4/29/2025), Disp: 30 tablet, Rfl: 3    riboflavin, vitamin B2, 400 mg Tab, Take 200 mg by mouth once daily. (Patient not taking: Reported on 4/29/2025), Disp: 30 tablet, Rfl: 3    rizatriptan (MAXALT-MLT) 5 MG disintegrating tablet, Take 1 tablet (5 mg total) by mouth daily as needed for Migraine. May repeat in 2 hours if needed, Disp: 9 tablet, Rfl: 3     2018

## 2025-05-08 NOTE — PROGRESS NOTES
Have you been to the ER, urgent care clinic since your last visit?  Hospitalized since your last visit?    YES [facility]    Have you seen or consulted any other health care providers outside our system since your last visit?   NO

## 2025-05-08 NOTE — PROGRESS NOTES
Brayden Monzno is a 59 y.o. male and presents with Neck Pain, Shoulder Pain, and Follow-up (EMERGENCY ROOM FOLLOW / [facility])  .  Subjective:  Shoulder Pain Review:  Patient complains of left side shoulder pains continue. The symptoms began months ago Course of symptoms since onset has been gradually worsening. Pain is described as overall severity = moderate. Symptoms were incited by no known event.  Patient denies N/A. Therapy to date includes OTC analgesics: effective.    Back Pain Review:  Patient presents for follow-up evaluation of low back problems.  Symptoms have been present for months and include pain in lower back (dull, mild in character;5/10 in severity). Initial inciting event: unknown. Symptoms are worst: at times. Alleviating factors identifiable by patient are lying flat, medication . Exacerbating factors identifiable by patient are bending forwards, bending backwards. Treatments so far initiated by patient: medication Previous lower back problems: reported. Previous workup: x-rays/injection:  IMPRESSION:  Degenerative findings detailed by level in body report. Note left lateral and  foraminal disc herniation at C6-7 with moderate left foraminal stenosis.            Hypertension Review:  The patient has essential hypertension  Diet and Lifestyle: generally follows a  low sodium diet, exercises sporadically  Home BP Monitoring: is not measured at home.  Pertinent ROS: taking medications as instructed, no medication side effects noted, no TIA's, no chest pain on exertion, no dyspnea on exertion, no swelling of ankles.   .       Review of Systems  Constitutional: negative for fevers, chills, anorexia and weight loss  Eyes:   negative for visual disturbance and irritation  ENT:   negative for tinnitus,sore throat,nasal congestion,ear pains.hoarseness  Respiratory:  negative for cough, hemoptysis, dyspnea,wheezing  CV:   negative for chest pain, palpitations, lower extremity edema  GI:   negative for

## 2025-05-26 ENCOUNTER — APPOINTMENT (OUTPATIENT)
Facility: HOSPITAL | Age: 60
End: 2025-05-26
Payer: MEDICAID

## 2025-05-26 ENCOUNTER — HOSPITAL ENCOUNTER (EMERGENCY)
Facility: HOSPITAL | Age: 60
Discharge: HOME HEALTH | End: 2025-05-26
Payer: MEDICAID

## 2025-05-26 VITALS
BODY MASS INDEX: 23.7 KG/M2 | TEMPERATURE: 97.9 F | HEIGHT: 69 IN | SYSTOLIC BLOOD PRESSURE: 133 MMHG | RESPIRATION RATE: 16 BRPM | OXYGEN SATURATION: 98 % | DIASTOLIC BLOOD PRESSURE: 82 MMHG | WEIGHT: 160 LBS | HEART RATE: 93 BPM

## 2025-05-26 DIAGNOSIS — M79.642 LEFT HAND PAIN: Primary | ICD-10-CM

## 2025-05-26 PROCEDURE — 6370000000 HC RX 637 (ALT 250 FOR IP)

## 2025-05-26 PROCEDURE — 73130 X-RAY EXAM OF HAND: CPT

## 2025-05-26 PROCEDURE — 99283 EMERGENCY DEPT VISIT LOW MDM: CPT

## 2025-05-26 RX ORDER — ACETAMINOPHEN 325 MG/1
650 TABLET ORAL
Status: COMPLETED | OUTPATIENT
Start: 2025-05-26 | End: 2025-05-26

## 2025-05-26 RX ADMIN — ACETAMINOPHEN 650 MG: 325 TABLET ORAL at 22:07

## 2025-05-26 ASSESSMENT — LIFESTYLE VARIABLES
HOW OFTEN DO YOU HAVE A DRINK CONTAINING ALCOHOL: PATIENT DECLINED
HOW MANY STANDARD DRINKS CONTAINING ALCOHOL DO YOU HAVE ON A TYPICAL DAY: PATIENT DECLINED

## 2025-05-26 ASSESSMENT — PAIN DESCRIPTION - ORIENTATION
ORIENTATION: LEFT
ORIENTATION: LEFT

## 2025-05-26 ASSESSMENT — PAIN SCALES - GENERAL
PAINLEVEL_OUTOF10: 10
PAINLEVEL_OUTOF10: 8

## 2025-05-26 ASSESSMENT — PAIN DESCRIPTION - DESCRIPTORS: DESCRIPTORS: CRAMPING

## 2025-05-26 ASSESSMENT — PAIN DESCRIPTION - LOCATION
LOCATION: SHOULDER
LOCATION: HAND

## 2025-05-26 ASSESSMENT — PAIN - FUNCTIONAL ASSESSMENT: PAIN_FUNCTIONAL_ASSESSMENT: 0-10

## 2025-05-26 ASSESSMENT — PAIN DESCRIPTION - PAIN TYPE: TYPE: ACUTE PAIN

## 2025-05-27 NOTE — ED NOTES
Pt presents to ED complaining of left thumb cramping sensation x 20 minutes prior to ED arrival. Pt reports hc of herniated disc and left shoulder pain. Pt denies any pain, cardiac or respiratory distress, injuries to hand. Pt is alert and oriented x 4, RR even and unlabored, skin is warm and dry. Pt appears in NAD at this time. Assessment completed and pt updated on plan of care.  Call bell in reach.      Emergency Department Nursing Plan of Care  The Nursing Plan of Care is developed from the Nursing assessment and Emergency Department Attending provider initial evaluation.  The plan of care may be reviewed in the “ED Provider note”.  The Plan of Care was developed with the following considerations:  Patient / Family readiness to learn indicated by:Refer to Medical chart in [facility]  Persons(s) to be included in education: Refer to Medical chart in [facility]  Barriers to Learning/Limitations:Normal      Signed    Gisselle Pennington, RN    5/26/2025   9:58 PM

## 2025-05-27 NOTE — ED TRIAGE NOTES
Pt c/o left hand cramping and locking up. Pt states this happened about 15 minutes ago and feels like his muscle in his hand is cramping.

## 2025-05-27 NOTE — ED NOTES
Discharge instructions were given to the patient by Cheryl Lynn RN.    The patient left the Emergency Department ambulatory, alert and oriented and in no acute distress with 0 prescriptions. The patient was encouraged to call or return to the ED for worsening issues or problems and was encouraged to schedule a follow up appointment for continuing care.    The patient verbalized understanding of discharge instructions and prescriptions, all questions were answered. The patient has no further concerns at this time.

## 2025-05-27 NOTE — ED PROVIDER NOTES
[facility] EMERGENCY DEPARTMENT  EMERGENCY DEPARTMENT ENCOUNTER       Pt Name: Brayden Monzon  MRN: [id number]  Birthdate 1965  Date of evaluation: 5/26/2025  Provider: Lizbeth Garrett PA-C   PCP: Keegan Singleton Jr., MD  Note Started: 9:56 PM EDT 5/26/25     CHIEF COMPLAINT       Chief Complaint   Patient presents with    Hand Pain        HISTORY OF PRESENT ILLNESS: 1 or more elements      History From: Patient  HPI Limitations: None     Brayden Monzon is a 59 y.o. male with past medical history depression, anxiety, GERD, tobacco use, hypertension, hyperlipidemia, Bell's palsy, chronic pain who presents complaining of \"cramping\" to his left hand/left thumb.  Patient reports that he was eating dinner earlier today when he felt like his left hand and left thumb was cramping up.  Patient reports that he felt a cramping sensation in the base of his left thumb.  Patient reports that symptoms started while he was eating dinner.  Patient denies any injury to the area, denies any excessive overuse of the hand.  Patient denies extremity numbness or extremity tingling.  Patient denies any extremity weakness.  Patient denies any trauma to the hand.  Patient denies any other associated symptoms.  Patient denies any fever, chills, nausea, vomiting, neck pain, chest pain, shortness of breath.      Nursing Notes were all reviewed and agreed with or any disagreements were addressed in the HPI.     REVIEW OF SYSTEMS      Review of Systems     Positives and Pertinent negatives as per HPI.    PAST HISTORY     Past Medical History:  Past Medical History:   Diagnosis Date    Anxiety     anxiety    Chronic pain     back    Gastrointestinal disorder     GERD (gastroesophageal reflux disease)     H/O Bell's palsy     left side of face tingles, intermittently, not frequently    Hyperlipidemia     Hypertension     Meningitis 2012    spinal meningitis  ; resolved as of 4/3/14    Positive PPD     CXR have been negative per

## 2025-07-24 ENCOUNTER — HOSPITAL ENCOUNTER (EMERGENCY)
Facility: HOSPITAL | Age: 60
Discharge: HOME | End: 2025-07-24
Payer: MEDICAID

## 2025-07-24 ENCOUNTER — APPOINTMENT (OUTPATIENT)
Facility: HOSPITAL | Age: 60
End: 2025-07-24
Payer: MEDICAID

## 2025-07-24 VITALS
HEIGHT: 69 IN | BODY MASS INDEX: 23.7 KG/M2 | WEIGHT: 160 LBS | RESPIRATION RATE: 18 BRPM | DIASTOLIC BLOOD PRESSURE: 90 MMHG | TEMPERATURE: 98.2 F | OXYGEN SATURATION: 99 % | HEART RATE: 70 BPM | SYSTOLIC BLOOD PRESSURE: 148 MMHG

## 2025-07-24 DIAGNOSIS — I10 ESSENTIAL (PRIMARY) HYPERTENSION: Primary | ICD-10-CM

## 2025-07-24 DIAGNOSIS — R07.89 ATYPICAL CHEST PAIN: Primary | ICD-10-CM

## 2025-07-24 LAB
ALBUMIN SERPL-MCNC: 3.6 G/DL (ref 3.5–5)
ALBUMIN/GLOB SERPL: 0.9 (ref 1.1–2.2)
ALP SERPL-CCNC: 76 U/L (ref 45–117)
ALT SERPL-CCNC: 22 U/L (ref 12–78)
ANION GAP SERPL CALC-SCNC: 7 MMOL/L (ref 2–12)
AST SERPL-CCNC: 18 U/L (ref 15–37)
BASOPHILS # BLD: 0.02 K/UL (ref 0–0.1)
BASOPHILS NFR BLD: 0.3 % (ref 0–1)
BILIRUB SERPL-MCNC: 1.3 MG/DL (ref 0.2–1)
BUN SERPL-MCNC: 14 MG/DL (ref 6–20)
BUN/CREAT SERPL: 15 (ref 12–20)
CALCIUM SERPL-MCNC: 8.4 MG/DL (ref 8.5–10.1)
CHLORIDE SERPL-SCNC: 104 MMOL/L (ref 97–108)
CO2 SERPL-SCNC: 30 MMOL/L (ref 21–32)
CREAT SERPL-MCNC: 0.93 MG/DL (ref 0.7–1.3)
DIFFERENTIAL METHOD BLD: NORMAL
EKG ATRIAL RATE: 68 BPM
EKG DIAGNOSIS: NORMAL
EKG P AXIS: 68 DEGREES
EKG P-R INTERVAL: 168 MS
EKG Q-T INTERVAL: 368 MS
EKG QRS DURATION: 90 MS
EKG QTC CALCULATION (BAZETT): 391 MS
EKG R AXIS: -2 DEGREES
EKG T AXIS: 41 DEGREES
EKG VENTRICULAR RATE: 68 BPM
EOSINOPHIL # BLD: 0.1 K/UL (ref 0–0.4)
EOSINOPHIL NFR BLD: 1.7 % (ref 0–7)
ERYTHROCYTE [DISTWIDTH] IN BLOOD BY AUTOMATED COUNT: 14.2 % (ref 11.5–14.5)
GLOBULIN SER CALC-MCNC: 4 G/DL (ref 2–4)
GLUCOSE SERPL-MCNC: 91 MG/DL (ref 65–100)
HCT VFR BLD AUTO: 39.8 % (ref 36.6–50.3)
HGB BLD-MCNC: 13.1 G/DL (ref 12.1–17)
IMM GRANULOCYTES # BLD AUTO: 0.02 K/UL (ref 0–0.04)
IMM GRANULOCYTES NFR BLD AUTO: 0.3 % (ref 0–0.5)
LYMPHOCYTES # BLD: 2.88 K/UL (ref 0.8–3.5)
LYMPHOCYTES NFR BLD: 48.5 % (ref 12–49)
MCH RBC QN AUTO: 27.2 PG (ref 26–34)
MCHC RBC AUTO-ENTMCNC: 32.9 G/DL (ref 30–36.5)
MCV RBC AUTO: 82.7 FL (ref 80–99)
MONOCYTES # BLD: 0.41 K/UL (ref 0–1)
MONOCYTES NFR BLD: 6.9 % (ref 5–13)
NEUTS SEG # BLD: 2.51 K/UL (ref 1.8–8)
NEUTS SEG NFR BLD: 42.3 % (ref 32–75)
NRBC # BLD: 0 K/UL (ref 0–0.01)
NRBC BLD-RTO: 0 PER 100 WBC
PLATELET # BLD AUTO: 180 K/UL (ref 150–400)
PMV BLD AUTO: 8.9 FL (ref 8.9–12.9)
POTASSIUM SERPL-SCNC: 3.6 MMOL/L (ref 3.5–5.1)
PROT SERPL-MCNC: 7.6 G/DL (ref 6.4–8.2)
RBC # BLD AUTO: 4.81 M/UL (ref 4.1–5.7)
SODIUM SERPL-SCNC: 141 MMOL/L (ref 136–145)
TROPONIN I SERPL HS-MCNC: 8 NG/L (ref 0–76)
WBC # BLD AUTO: 5.9 K/UL (ref 4.1–11.1)

## 2025-07-24 PROCEDURE — 80053 COMPREHEN METABOLIC PANEL: CPT

## 2025-07-24 PROCEDURE — 71045 X-RAY EXAM CHEST 1 VIEW: CPT

## 2025-07-24 PROCEDURE — 93005 ELECTROCARDIOGRAM TRACING: CPT

## 2025-07-24 PROCEDURE — 84484 ASSAY OF TROPONIN QUANT: CPT

## 2025-07-24 PROCEDURE — 99285 EMERGENCY DEPT VISIT HI MDM: CPT

## 2025-07-24 PROCEDURE — 36415 COLL VENOUS BLD VENIPUNCTURE: CPT

## 2025-07-24 PROCEDURE — 85025 COMPLETE CBC W/AUTO DIFF WBC: CPT

## 2025-07-24 RX ORDER — AMLODIPINE BESYLATE 2.5 MG/1
2.5 TABLET ORAL DAILY
Qty: 90 TABLET | Refills: 0 | Status: SHIPPED | OUTPATIENT
Start: 2025-07-24

## 2025-07-24 ASSESSMENT — PAIN - FUNCTIONAL ASSESSMENT: PAIN_FUNCTIONAL_ASSESSMENT: 0-10

## 2025-07-24 ASSESSMENT — PAIN DESCRIPTION - DESCRIPTORS: DESCRIPTORS: SHARP

## 2025-07-24 ASSESSMENT — PAIN DESCRIPTION - ORIENTATION: ORIENTATION: MID

## 2025-07-24 ASSESSMENT — PAIN DESCRIPTION - LOCATION: LOCATION: CHEST

## 2025-07-24 ASSESSMENT — PAIN SCALES - GENERAL: PAINLEVEL_OUTOF10: 8
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
## 2025-07-24 NOTE — DISCHARGE INSTRUCTIONS
your follow-up appointment.     If a prescription has been provided, please have it filled as soon as possible to prevent a delay in treatment. Read the entire medication instruction sheet provided to you by the pharmacy. If you have any questions or reservations about taking the medication due to side effects or interactions with other medications, please call your primary care physician or contact the ER to speak with the charge nurse.     Please make an appointment with your family doctor or the physician you were referred to for follow-up of this visit as instructed on your discharge paperwork. Return to the ER if you are unable to be seen or if you are unable to be seen in a timely manner.    Should you experience abdominal pain lasting greater than 6 hours, chest pain, difficulty breathing, fever/chills, numbness/tingling, skin changes or other symptoms that concern you, return to the ED sooner. If you feel worse over the next 24 hours, please return to the ED. We are available 24 hours a day. Thank you for trusting us with your care!

## 2025-07-24 NOTE — ED TRIAGE NOTES
Pt arrives ambulatory to triage complaining of intermittent sharp medial chest pain that began this morning. Pt reports hx of HTN and states he has been out of his medication for 7 days. Pt takes Amlodipine.

## 2025-07-24 NOTE — ED NOTES
Patient (s)  given copy of dc instructions and 1 script(s). Patient (s)  verbalized understanding of instructions and script (s). Patient given a current medication reconciliation form and verbalized understanding of their medications. Patient (s) verbalized understanding of the importance of discussing medications with his or her physician or clinic they will be following up with. Patient alert and oriented and in no acute distress. Patient discharged home ambulatory with a steady gait unassisted.

## 2025-07-28 LAB
EKG ATRIAL RATE: 68 BPM
EKG DIAGNOSIS: NORMAL
EKG P AXIS: 68 DEGREES
EKG P-R INTERVAL: 168 MS
EKG Q-T INTERVAL: 368 MS
EKG QRS DURATION: 90 MS
EKG QTC CALCULATION (BAZETT): 391 MS
EKG R AXIS: -2 DEGREES
EKG T AXIS: 41 DEGREES
EKG VENTRICULAR RATE: 68 BPM

## 2025-08-15 ENCOUNTER — OFFICE VISIT (OUTPATIENT)
Facility: CLINIC | Age: 60
End: 2025-08-15
Payer: MEDICAID

## 2025-08-15 VITALS
SYSTOLIC BLOOD PRESSURE: 134 MMHG | RESPIRATION RATE: 16 BRPM | HEIGHT: 69 IN | WEIGHT: 158 LBS | OXYGEN SATURATION: 97 % | HEART RATE: 79 BPM | BODY MASS INDEX: 23.4 KG/M2 | DIASTOLIC BLOOD PRESSURE: 82 MMHG

## 2025-08-15 DIAGNOSIS — Z12.11 SCREENING FOR COLON CANCER: ICD-10-CM

## 2025-08-15 DIAGNOSIS — I10 ESSENTIAL (PRIMARY) HYPERTENSION: ICD-10-CM

## 2025-08-15 DIAGNOSIS — M50.20 CERVICAL DISC HERNIATION: Primary | ICD-10-CM

## 2025-08-15 PROCEDURE — 99214 OFFICE O/P EST MOD 30 MIN: CPT | Performed by: INTERNAL MEDICINE

## 2025-08-15 PROCEDURE — 3079F DIAST BP 80-89 MM HG: CPT | Performed by: INTERNAL MEDICINE

## 2025-08-15 PROCEDURE — 3075F SYST BP GE 130 - 139MM HG: CPT | Performed by: INTERNAL MEDICINE

## 2025-08-15 ASSESSMENT — PATIENT HEALTH QUESTIONNAIRE - PHQ9
2. FEELING DOWN, DEPRESSED OR HOPELESS: NOT AT ALL
1. LITTLE INTEREST OR PLEASURE IN DOING THINGS: NOT AT ALL
SUM OF ALL RESPONSES TO PHQ QUESTIONS 1-9: 0

## (undated) DEVICE — Device

## (undated) DEVICE — TOWEL 4 PLY TISS 19X30 SUE WHT

## (undated) DEVICE — BASIN EMSIS 16OZ GRAPHITE PLAS KID SHP MOLD GRAD FOR ORAL

## (undated) DEVICE — SYR 3ML LL TIP 1/10ML GRAD --

## (undated) DEVICE — Z DISCONTINUED PER MEDLINE LINE GAS SAMPLING O2/CO2 LNG AD 13 FT NSL W/ TBNG FILTERLINE

## (undated) DEVICE — CATH IV AUTOGRD BC PNK 20GA 25 -- INSYTE

## (undated) DEVICE — SOLIDIFIER MEDC 1200ML -- CONVERT TO 356117

## (undated) DEVICE — SET ADMIN 16ML TBNG L100IN 2 Y INJ SITE IV PIGGY BK DISP

## (undated) DEVICE — NEONATAL-ADULT SPO2 SENSOR: Brand: NELLCOR

## (undated) DEVICE — NEEDLE HYPO 18GA L1.5IN PNK S STL HUB POLYPR SHLD REG BVL

## (undated) DEVICE — 1200 GUARD II KIT W/5MM TUBE W/O VAC TUBE: Brand: GUARDIAN

## (undated) DEVICE — SYR 10ML LUER LOK 1/5ML GRAD --

## (undated) DEVICE — KENDALL RADIOLUCENT FOAM MONITORING ELECTRODE RECTANGULAR SHAPE: Brand: KENDALL